# Patient Record
Sex: FEMALE | Race: WHITE | Employment: FULL TIME | URBAN - METROPOLITAN AREA
[De-identification: names, ages, dates, MRNs, and addresses within clinical notes are randomized per-mention and may not be internally consistent; named-entity substitution may affect disease eponyms.]

---

## 2017-02-28 ENCOUNTER — APPOINTMENT (OUTPATIENT)
Dept: LAB | Facility: REFERENCE LAB | Age: 32
End: 2017-02-28
Attending: OBSTETRICS & GYNECOLOGY
Payer: COMMERCIAL

## 2017-02-28 ENCOUNTER — OFFICE VISIT (OUTPATIENT)
Dept: OBGYN CLINIC | Facility: CLINIC | Age: 32
End: 2017-02-28

## 2017-02-28 VITALS
SYSTOLIC BLOOD PRESSURE: 120 MMHG | DIASTOLIC BLOOD PRESSURE: 80 MMHG | WEIGHT: 184.63 LBS | HEIGHT: 65 IN | BODY MASS INDEX: 30.76 KG/M2

## 2017-02-28 DIAGNOSIS — Z01.419 ENCOUNTER FOR GYNECOLOGICAL EXAMINATION WITHOUT ABNORMAL FINDING: Primary | ICD-10-CM

## 2017-02-28 DIAGNOSIS — N92.0 MENORRHAGIA WITH REGULAR CYCLE: ICD-10-CM

## 2017-02-28 LAB
ERYTHROCYTE [DISTWIDTH] IN BLOOD BY AUTOMATED COUNT: 13.5 % (ref 11–15)
HCT VFR BLD AUTO: 46.2 % (ref 35–48)
HGB BLD-MCNC: 15.4 G/DL (ref 12–16)
MCH RBC QN AUTO: 32.6 PG (ref 27–32)
MCHC RBC AUTO-ENTMCNC: 33.4 G/DL (ref 32–37)
MCV RBC AUTO: 97.4 FL (ref 80–100)
PLATELET # BLD AUTO: 243 K/UL (ref 140–400)
PMV BLD AUTO: 8.2 FL (ref 7.4–10.3)
RBC # BLD AUTO: 4.74 M/UL (ref 3.7–5.4)
TSH SERPL-ACNC: 3.91 UIU/ML (ref 0.34–5.6)
WBC # BLD AUTO: 9.1 K/UL (ref 4–11)

## 2017-02-28 PROCEDURE — 84443 ASSAY THYROID STIM HORMONE: CPT

## 2017-02-28 PROCEDURE — 88175 CYTOPATH C/V AUTO FLUID REDO: CPT | Performed by: OBSTETRICS & GYNECOLOGY

## 2017-02-28 PROCEDURE — 85027 COMPLETE CBC AUTOMATED: CPT

## 2017-02-28 PROCEDURE — 99385 PREV VISIT NEW AGE 18-39: CPT | Performed by: OBSTETRICS & GYNECOLOGY

## 2017-02-28 PROCEDURE — 36415 COLL VENOUS BLD VENIPUNCTURE: CPT

## 2017-02-28 NOTE — PROGRESS NOTES
GYN H&P     2017  2:23 PM    CC:Patient presents for pap and evaluation of heavy bleeding.     HPI: Patient is a 27year old  who presents for annual.  + c/o episodic LAP \"like a burning sensation\"  X 1year which comes/goes, lasting up to 30 m Systems:  General: denies fevers, chills, fatigue and malaise, wt gain or loss  GI: denies abdominal pain, nausea, vomiting, diarrhea, constipation   :denies dysuria, denies urinary frequency.    Menses regular, no dysmenorrhea, no menorrhagia, no dyspare at an increased risk or bladder injury, but that this was possible with an abdominal or laparoscopic approach as well.  I discussed with the patient  the procedure including the preop/procedure/postop course and the risks of  bleeding, infection, damage to

## 2017-03-02 LAB — HPV I/H RISK 1 DNA SPEC QL NAA+PROBE: NEGATIVE

## 2017-03-04 ENCOUNTER — HOSPITAL ENCOUNTER (OUTPATIENT)
Dept: ULTRASOUND IMAGING | Age: 32
Discharge: HOME | End: 2017-03-04
Attending: OBSTETRICS & GYNECOLOGY
Payer: COMMERCIAL

## 2017-03-04 DIAGNOSIS — N92.0 MENORRHAGIA WITH REGULAR CYCLE: ICD-10-CM

## 2017-03-04 PROCEDURE — 76830 TRANSVAGINAL US NON-OB: CPT

## 2017-03-04 PROCEDURE — 76856 US EXAM PELVIC COMPLETE: CPT

## 2017-03-06 ENCOUNTER — TELEPHONE (OUTPATIENT)
Dept: OBGYN CLINIC | Facility: CLINIC | Age: 32
End: 2017-03-06

## 2017-03-07 ENCOUNTER — TELEPHONE (OUTPATIENT)
Dept: OBGYN CLINIC | Facility: CLINIC | Age: 32
End: 2017-03-07

## 2017-03-13 ENCOUNTER — MED REC SCAN ONLY (OUTPATIENT)
Dept: OBGYN CLINIC | Facility: CLINIC | Age: 32
End: 2017-03-13

## 2017-04-06 ENCOUNTER — OFFICE VISIT (OUTPATIENT)
Dept: OBGYN CLINIC | Facility: CLINIC | Age: 32
End: 2017-04-06

## 2017-04-06 VITALS
HEIGHT: 65 IN | SYSTOLIC BLOOD PRESSURE: 122 MMHG | BODY MASS INDEX: 29.38 KG/M2 | DIASTOLIC BLOOD PRESSURE: 88 MMHG | WEIGHT: 176.38 LBS

## 2017-04-06 DIAGNOSIS — Z09 POSTOP CHECK: Primary | ICD-10-CM

## 2017-04-06 PROCEDURE — 99024 POSTOP FOLLOW-UP VISIT: CPT | Performed by: OBSTETRICS & GYNECOLOGY

## 2017-04-06 RX ORDER — IBUPROFEN 600 MG/1
600 TABLET ORAL
COMMUNITY
Start: 2015-11-16 | End: 2018-11-19

## 2017-04-06 NOTE — PROGRESS NOTES
3 W postop  Vaginal hysterectomy. Doing well. No problems with urination. No fever or chills. No vaginal bleeding. She has returned to work. ABD soft nt/nd    Post op check - doing well. Recommend FU in 3 W.

## 2017-04-25 ENCOUNTER — OFFICE VISIT (OUTPATIENT)
Dept: OBGYN CLINIC | Facility: CLINIC | Age: 32
End: 2017-04-25

## 2017-04-25 VITALS
DIASTOLIC BLOOD PRESSURE: 76 MMHG | SYSTOLIC BLOOD PRESSURE: 120 MMHG | BODY MASS INDEX: 29.82 KG/M2 | WEIGHT: 179 LBS | HEIGHT: 65 IN

## 2017-04-25 DIAGNOSIS — J30.2 SEASONAL ALLERGIC RHINITIS, UNSPECIFIED ALLERGIC RHINITIS TRIGGER: ICD-10-CM

## 2017-04-25 DIAGNOSIS — Z09 POSTOP CHECK: Primary | ICD-10-CM

## 2017-04-25 PROCEDURE — 99024 POSTOP FOLLOW-UP VISIT: CPT | Performed by: OBSTETRICS & GYNECOLOGY

## 2017-04-25 RX ORDER — FLUTICASONE PROPIONATE 50 MCG
2 SPRAY, SUSPENSION (ML) NASAL DAILY
Qty: 2 BOTTLE | Refills: 5 | Status: SHIPPED | OUTPATIENT
Start: 2017-04-25 | End: 2019-06-11

## 2017-04-25 NOTE — PROGRESS NOTES
GYNECOLOGY RETURN VISIT          2017  4:09 PM    CC:Patient presents for post op check. HPI: Patient is a 27year old  No LMP recorded. who presents for above s/p 3/8 vg hys, bilateral salpingectomy. No c/o.   No pain, no nausea or vomiting palpable tenderness or masses, no discharge  Vagina: normal pink mucosa, no lesions, normal clear discharge. Cuff well healed      A/P: Patient is 27year old female here for postop check. Doing well. Ok to return to normal activities.     Flonase refilled

## 2017-05-03 ENCOUNTER — MED REC SCAN ONLY (OUTPATIENT)
Dept: OBGYN CLINIC | Facility: CLINIC | Age: 32
End: 2017-05-03

## 2018-01-22 ENCOUNTER — CHARTING TRANS (OUTPATIENT)
Dept: OTHER | Age: 33
End: 2018-01-22

## 2018-11-02 VITALS
RESPIRATION RATE: 16 BRPM | BODY MASS INDEX: 30.82 KG/M2 | WEIGHT: 185 LBS | DIASTOLIC BLOOD PRESSURE: 70 MMHG | SYSTOLIC BLOOD PRESSURE: 100 MMHG | HEIGHT: 65 IN | TEMPERATURE: 98.5 F | HEART RATE: 57 BPM

## 2018-11-16 ENCOUNTER — TELEPHONE (OUTPATIENT)
Dept: OBGYN CLINIC | Facility: CLINIC | Age: 33
End: 2018-11-16

## 2018-11-16 NOTE — TELEPHONE ENCOUNTER
Spoke to pt and encouraged her to see her PCP for orders for smoking cessation meds. Per pt, she does not have a PCP currently. Will discuss with Dr. Bee Arteaga on 11/19/18. Pt aware.

## 2018-11-19 ENCOUNTER — OFFICE VISIT (OUTPATIENT)
Dept: FAMILY MEDICINE CLINIC | Facility: CLINIC | Age: 33
End: 2018-11-19

## 2018-11-19 VITALS
DIASTOLIC BLOOD PRESSURE: 78 MMHG | WEIGHT: 188 LBS | HEIGHT: 65 IN | BODY MASS INDEX: 31.32 KG/M2 | OXYGEN SATURATION: 98 % | SYSTOLIC BLOOD PRESSURE: 120 MMHG | HEART RATE: 63 BPM

## 2018-11-19 DIAGNOSIS — F17.200 TOBACCO USE DISORDER: ICD-10-CM

## 2018-11-19 DIAGNOSIS — E28.2 PCOS (POLYCYSTIC OVARIAN SYNDROME): Primary | ICD-10-CM

## 2018-11-19 PROCEDURE — 99202 OFFICE O/P NEW SF 15 MIN: CPT | Performed by: FAMILY MEDICINE

## 2018-11-19 RX ORDER — BUPROPION HYDROCHLORIDE 150 MG/1
TABLET, EXTENDED RELEASE ORAL
Qty: 60 TABLET | Refills: 0 | Status: SHIPPED | OUTPATIENT
Start: 2018-11-19 | End: 2018-12-08

## 2018-11-19 NOTE — PROGRESS NOTES
CC:  Patient presents with:  Establish Care  Medication Request: would like to start medication to help quit smoking   Medication Request: metformin      HPI: 28year old here to discuss smoking cessation and resuming Metformin for PCOS.   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  Tobacco use disorder    - Will start Wellbutrin once daily x 3 days and increase to twice daily for smokin

## 2018-12-08 ENCOUNTER — OFFICE VISIT (OUTPATIENT)
Dept: FAMILY MEDICINE CLINIC | Facility: CLINIC | Age: 33
End: 2018-12-08

## 2018-12-08 VITALS
OXYGEN SATURATION: 99 % | HEART RATE: 60 BPM | SYSTOLIC BLOOD PRESSURE: 114 MMHG | BODY MASS INDEX: 31.49 KG/M2 | TEMPERATURE: 98 F | HEIGHT: 65 IN | DIASTOLIC BLOOD PRESSURE: 68 MMHG | WEIGHT: 189 LBS

## 2018-12-08 DIAGNOSIS — F17.200 TOBACCO USE DISORDER: ICD-10-CM

## 2018-12-08 DIAGNOSIS — J01.10 ACUTE NON-RECURRENT FRONTAL SINUSITIS: Primary | ICD-10-CM

## 2018-12-08 PROCEDURE — 99213 OFFICE O/P EST LOW 20 MIN: CPT | Performed by: FAMILY MEDICINE

## 2018-12-08 RX ORDER — BUPROPION HYDROCHLORIDE 150 MG/1
150 TABLET, EXTENDED RELEASE ORAL 2 TIMES DAILY
Qty: 60 TABLET | Refills: 1 | Status: SHIPPED | OUTPATIENT
Start: 2018-12-08 | End: 2019-02-12 | Stop reason: ALTCHOICE

## 2018-12-08 RX ORDER — AMOXICILLIN AND CLAVULANATE POTASSIUM 875; 125 MG/1; MG/1
1 TABLET, FILM COATED ORAL 2 TIMES DAILY
Qty: 20 TABLET | Refills: 0 | Status: SHIPPED | OUTPATIENT
Start: 2018-12-08 | End: 2018-12-18

## 2018-12-08 NOTE — PROGRESS NOTES
CC:  Patient presents with:  Sinus Problem: swollen lymph nodes, and both ears hurt, started about 4 days ago-declines fever      HPI: 28year old female here with sinus pain, ear pain, and lymph node swelling x 4 days.   Developed bilatreal ear pain about tobacco: Never Used    Substance and Sexual Activity      Alcohol use:  Yes        Alcohol/week: 0.0 oz      Drug use: No      Sexual activity: No        Partners: Male    Other Topics      Concerns:        Caffeine Concern: Not Asked        Exercise: Not A bacterial component, and antibiotic sent to pharmacy with wait and see instructions reviewed    2.  Tobacco use disorder    - Continue cessation with Wellbutrin twice daily, refill provided   - BuPROPion HCl ER, SR, (WELLBUTRIN SR) 150 MG Oral Tablet 12 Hr;

## 2019-02-12 ENCOUNTER — LAB ENCOUNTER (OUTPATIENT)
Dept: LAB | Facility: REFERENCE LAB | Age: 34
End: 2019-02-12
Attending: FAMILY MEDICINE
Payer: COMMERCIAL

## 2019-02-12 ENCOUNTER — OFFICE VISIT (OUTPATIENT)
Dept: FAMILY MEDICINE CLINIC | Facility: CLINIC | Age: 34
End: 2019-02-12
Payer: COMMERCIAL

## 2019-02-12 VITALS
HEIGHT: 65 IN | BODY MASS INDEX: 32.49 KG/M2 | SYSTOLIC BLOOD PRESSURE: 112 MMHG | OXYGEN SATURATION: 98 % | DIASTOLIC BLOOD PRESSURE: 66 MMHG | WEIGHT: 195 LBS | HEART RATE: 74 BPM

## 2019-02-12 DIAGNOSIS — F50.81 BINGE EATING DISORDER: ICD-10-CM

## 2019-02-12 DIAGNOSIS — J30.2 SEASONAL ALLERGIES: ICD-10-CM

## 2019-02-12 DIAGNOSIS — F32.81 PMDD (PREMENSTRUAL DYSPHORIC DISORDER): ICD-10-CM

## 2019-02-12 DIAGNOSIS — Z00.01 ENCOUNTER FOR ROUTINE ADULT HEALTH EXAMINATION WITH ABNORMAL FINDINGS: ICD-10-CM

## 2019-02-12 DIAGNOSIS — Z00.01 ENCOUNTER FOR ROUTINE ADULT HEALTH EXAMINATION WITH ABNORMAL FINDINGS: Primary | ICD-10-CM

## 2019-02-12 LAB
ALBUMIN SERPL-MCNC: 3.7 G/DL (ref 3.4–5)
ALBUMIN/GLOB SERPL: 1 {RATIO} (ref 1–2)
ALP LIVER SERPL-CCNC: 54 U/L (ref 37–98)
ALT SERPL-CCNC: 26 U/L (ref 13–56)
ANION GAP SERPL CALC-SCNC: 9 MMOL/L (ref 0–18)
AST SERPL-CCNC: 15 U/L (ref 15–37)
BASOPHILS # BLD AUTO: 0.04 X10(3) UL (ref 0–0.2)
BASOPHILS NFR BLD AUTO: 0.7 %
BILIRUB SERPL-MCNC: 0.5 MG/DL (ref 0.1–2)
BUN BLD-MCNC: 7 MG/DL (ref 7–18)
BUN/CREAT SERPL: 8.6 (ref 10–20)
CALCIUM BLD-MCNC: 8.7 MG/DL (ref 8.5–10.1)
CHLORIDE SERPL-SCNC: 103 MMOL/L (ref 98–107)
CHOLEST SMN-MCNC: 206 MG/DL (ref ?–200)
CO2 SERPL-SCNC: 28 MMOL/L (ref 21–32)
CREAT BLD-MCNC: 0.81 MG/DL (ref 0.55–1.02)
DEPRECATED RDW RBC AUTO: 43.1 FL (ref 35.1–46.3)
EOSINOPHIL # BLD AUTO: 0.15 X10(3) UL (ref 0–0.7)
EOSINOPHIL NFR BLD AUTO: 2.5 %
ERYTHROCYTE [DISTWIDTH] IN BLOOD BY AUTOMATED COUNT: 12 % (ref 11–15)
GLOBULIN PLAS-MCNC: 3.7 G/DL (ref 2.8–4.4)
GLUCOSE BLD-MCNC: 102 MG/DL (ref 70–99)
HCT VFR BLD AUTO: 41.7 % (ref 35–48)
HDLC SERPL-MCNC: 36 MG/DL (ref 40–59)
HGB BLD-MCNC: 14.4 G/DL (ref 12–16)
IMM GRANULOCYTES # BLD AUTO: 0.01 X10(3) UL (ref 0–1)
IMM GRANULOCYTES NFR BLD: 0.2 %
LDLC SERPL CALC-MCNC: 99 MG/DL (ref ?–100)
LYMPHOCYTES # BLD AUTO: 1.8 X10(3) UL (ref 1–4)
LYMPHOCYTES NFR BLD AUTO: 30.3 %
M PROTEIN MFR SERPL ELPH: 7.4 G/DL (ref 6.4–8.2)
MCH RBC QN AUTO: 33.6 PG (ref 26–34)
MCHC RBC AUTO-ENTMCNC: 34.5 G/DL (ref 31–37)
MCV RBC AUTO: 97.4 FL (ref 80–100)
MONOCYTES # BLD AUTO: 0.45 X10(3) UL (ref 0.1–1)
MONOCYTES NFR BLD AUTO: 7.6 %
NEUTROPHILS # BLD AUTO: 3.49 X10 (3) UL (ref 1.5–7.7)
NEUTROPHILS # BLD AUTO: 3.49 X10(3) UL (ref 1.5–7.7)
NEUTROPHILS NFR BLD AUTO: 58.7 %
NONHDLC SERPL-MCNC: 170 MG/DL (ref ?–130)
OSMOLALITY SERPL CALC.SUM OF ELEC: 288 MOSM/KG (ref 275–295)
PLATELET # BLD AUTO: 250 10(3)UL (ref 150–450)
POTASSIUM SERPL-SCNC: 4.4 MMOL/L (ref 3.5–5.1)
RBC # BLD AUTO: 4.28 X10(6)UL (ref 3.8–5.3)
SODIUM SERPL-SCNC: 140 MMOL/L (ref 136–145)
TRIGL SERPL-MCNC: 354 MG/DL (ref 30–149)
WBC # BLD AUTO: 5.9 X10(3) UL (ref 4–11)

## 2019-02-12 PROCEDURE — 36415 COLL VENOUS BLD VENIPUNCTURE: CPT

## 2019-02-12 PROCEDURE — 99395 PREV VISIT EST AGE 18-39: CPT | Performed by: FAMILY MEDICINE

## 2019-02-12 PROCEDURE — 80061 LIPID PANEL: CPT

## 2019-02-12 PROCEDURE — 99213 OFFICE O/P EST LOW 20 MIN: CPT | Performed by: FAMILY MEDICINE

## 2019-02-12 PROCEDURE — 85025 COMPLETE CBC W/AUTO DIFF WBC: CPT

## 2019-02-12 PROCEDURE — 80053 COMPREHEN METABOLIC PANEL: CPT

## 2019-02-12 RX ORDER — PHENTERMINE HYDROCHLORIDE 15 MG/1
15 CAPSULE ORAL EVERY MORNING
Qty: 30 CAPSULE | Refills: 0 | Status: SHIPPED | OUTPATIENT
Start: 2019-02-12 | End: 2019-06-11

## 2019-02-12 RX ORDER — FLUOXETINE 10 MG/1
CAPSULE ORAL
Qty: 60 CAPSULE | Refills: 0 | Status: SHIPPED | OUTPATIENT
Start: 2019-02-12 | End: 2019-03-19

## 2019-02-12 RX ORDER — FLUOXETINE 10 MG/1
CAPSULE ORAL
Qty: 60 CAPSULE | Refills: 0 | Status: SHIPPED | OUTPATIENT
Start: 2019-02-12 | End: 2019-02-12

## 2019-02-12 NOTE — PATIENT INSTRUCTIONS
-Start taking Bupropion 150 mg once daily for 3 days, then stop Bupropion for 1 days, then start Fluoxetine 10 mg daily  -After 7 days of taking Fluoxetine 10 mg daily, increase to 20 mg daily  -Start Phentermine 15 mg daily and can increase at next visit overweight or obese and have 1 or more other risk factors for diabetes At least every 3 years.  Also, testing for diabetes during pregnancy after the 24th week.    Type 2 diabetes, prediabetes All women diagnosed with gestational diabetes Lifelong testing e given 6 months after the first dose   Influenza (flu) All women in this age group Once a year   Measles, mumps, rubella (MMR) All women in this age group who have no record of these infections or vaccines 1 or 2 doses   Meningococcal Women at increased ris should get all appropriate catch-up vaccines recommended by the CDC. Date Last Reviewed: 10/1/2017  © 4017-9670 [employer]. [address] [facility], [address] [place] [address]. All rights reserved.  This information is not intended as a substitute for heartbeat  · feeling faint or lightheaded, falls  · feeling agitated, angry, or irritable  · hallucination, loss of contact with reality  · loss of balance or coordination  · loss of memory  · restlessness, pacing, inability to keep still  · seizures  · st schedule. Do not take double or extra doses. Where should I keep my medicine? Keep out of the reach of children. Store at room temperature between 15 and 30 degrees C (59 and 86 degrees F). Throw away any unused medicine after the expiration date.   What severely restless, overly excited and hyperactive, or not being able to sleep. If this happens, especially at the beginning of treatment or after a change in dose, call your health care professional.  Nitza Mckeon may get drowsy or dizzy.  Do not drive, use machiner

## 2019-02-12 NOTE — PROGRESS NOTES
HPI:   Shaye Encarnacion is a 28year old female who presents for a complete physical exam.     Felt like the Bupropion helped a lot initially with cravings but over the last few weeks it has not helped as much and she has been gaining weight and smokin HCl 15 MG Oral Cap Take 1 capsule (15 mg total) by mouth every morning. Disp: 30 capsule Rfl: 0   FLUoxetine HCl 10 MG Oral Cap Take 1 capsule (10 mg total) by mouth daily for 7 days, THEN 2 capsules (20 mg total) daily for 23 days.  Disp: 60 capsule Rfl: 0 masses   BREAST: no dominant or suspicious mass, no nipple discharge  LUNGS: clear to auscultation  CARDIO: RRR without murmur  GI: good bowel sounds, no masses, HSM or tenderness  EXTREMITIES: no edema    No results found for: CHOLEST, HDL, LDL, TRIGLY cessation  -Diabetes screening which she desires  -Cholesterol screening which she desires  -Recommendation for yearly influenza vaccine  -Need for Tdap once as an adult and Td booster every 10 years  -Contraception: Essure and SUNDEEP  -STI screening (BHAKTI/Chla

## 2019-02-14 DIAGNOSIS — R73.01 ELEVATED FASTING GLUCOSE: Primary | ICD-10-CM

## 2019-02-14 DIAGNOSIS — E78.1 HYPERTRIGLYCERIDEMIA: ICD-10-CM

## 2019-03-19 RX ORDER — FLUOXETINE 10 MG/1
CAPSULE ORAL
Qty: 60 CAPSULE | Refills: 0 | Status: SHIPPED | OUTPATIENT
Start: 2019-03-19 | End: 2019-06-11 | Stop reason: ALTCHOICE

## 2019-03-19 RX ORDER — PHENTERMINE HYDROCHLORIDE 15 MG/1
15 CAPSULE ORAL EVERY MORNING
Qty: 30 CAPSULE | Refills: 0 | OUTPATIENT
Start: 2019-03-19

## 2019-03-19 NOTE — TELEPHONE ENCOUNTER
A refill request was received for:  Requested Prescriptions     Pending Prescriptions Disp Refills   • FLUOXETINE HCL 10 MG Oral Cap [Pharmacy Med Name: FLUOXETINE 10MG CAPSULES] 60 capsule 0     Sig: TAKE 1 CAPSULE(10 MG) BY MOUTH DAILY FOR 7 DAYS THEN TA

## 2019-06-07 ENCOUNTER — TELEPHONE (OUTPATIENT)
Dept: FAMILY MEDICINE CLINIC | Facility: CLINIC | Age: 34
End: 2019-06-07

## 2019-06-07 NOTE — TELEPHONE ENCOUNTER
Pt advised of AS's suggestions and verbalized understanding. This RN reviewed with pt the open orders for repeat blood work, pt will go in to get those drawn before her next appt. Pt reminded they should be fasting.     Jamison Bowie, DO  You 2 minutes ago (

## 2019-06-07 NOTE — TELEPHONE ENCOUNTER
Pt states she has had foggy urine with \"some pink color in it\", for about month. Pt states she is drinking a lot of water, but feels \"thirsty all the time\". Pt states 2-3 L per day.   Denies pain and burning with urination but does state she has freque

## 2019-06-07 NOTE — TELEPHONE ENCOUNTER
Pt calling c/o burning in both thums and now spread to both hands. Pt also c/o \"pinkish foggy\" urine. appt scheduled for 06/11/2019 with Dr Jhonatan Fay but would like nurse to call her back.

## 2019-06-10 ENCOUNTER — LAB ENCOUNTER (OUTPATIENT)
Dept: LAB | Facility: REFERENCE LAB | Age: 34
End: 2019-06-10
Attending: FAMILY MEDICINE
Payer: COMMERCIAL

## 2019-06-10 DIAGNOSIS — E78.1 HYPERTRIGLYCERIDEMIA: ICD-10-CM

## 2019-06-10 DIAGNOSIS — R73.01 ELEVATED FASTING GLUCOSE: ICD-10-CM

## 2019-06-10 PROCEDURE — 80061 LIPID PANEL: CPT

## 2019-06-10 PROCEDURE — 36415 COLL VENOUS BLD VENIPUNCTURE: CPT

## 2019-06-10 PROCEDURE — 82947 ASSAY GLUCOSE BLOOD QUANT: CPT

## 2019-06-11 ENCOUNTER — OFFICE VISIT (OUTPATIENT)
Dept: FAMILY MEDICINE CLINIC | Facility: CLINIC | Age: 34
End: 2019-06-11
Payer: COMMERCIAL

## 2019-06-11 VITALS
OXYGEN SATURATION: 98 % | HEIGHT: 65 IN | WEIGHT: 184 LBS | SYSTOLIC BLOOD PRESSURE: 110 MMHG | BODY MASS INDEX: 30.66 KG/M2 | DIASTOLIC BLOOD PRESSURE: 80 MMHG | HEART RATE: 72 BPM

## 2019-06-11 DIAGNOSIS — G62.9 NEUROPATHY: Primary | ICD-10-CM

## 2019-06-11 DIAGNOSIS — R73.01 ELEVATED FASTING GLUCOSE: ICD-10-CM

## 2019-06-11 DIAGNOSIS — F17.200 TOBACCO USE DISORDER: ICD-10-CM

## 2019-06-11 DIAGNOSIS — E78.1 HYPERTRIGLYCERIDEMIA: ICD-10-CM

## 2019-06-11 DIAGNOSIS — J30.2 SEASONAL ALLERGIC RHINITIS: ICD-10-CM

## 2019-06-11 PROCEDURE — 99214 OFFICE O/P EST MOD 30 MIN: CPT | Performed by: FAMILY MEDICINE

## 2019-06-11 RX ORDER — BUPROPION HYDROCHLORIDE 150 MG/1
TABLET, EXTENDED RELEASE ORAL
Qty: 60 TABLET | Refills: 0 | Status: SHIPPED | OUTPATIENT
Start: 2019-06-11 | End: 2019-08-05

## 2019-06-11 RX ORDER — FLUTICASONE PROPIONATE 50 MCG
2 SPRAY, SUSPENSION (ML) NASAL DAILY
Qty: 2 BOTTLE | Refills: 5 | Status: SHIPPED | OUTPATIENT
Start: 2019-06-11 | End: 2020-01-07

## 2019-06-11 RX ORDER — PHENTERMINE HYDROCHLORIDE 15 MG/1
15 CAPSULE ORAL EVERY MORNING
Qty: 30 CAPSULE | Refills: 1 | Status: SHIPPED | OUTPATIENT
Start: 2019-06-11 | End: 2020-01-07

## 2019-06-11 NOTE — PATIENT INSTRUCTIONS
Getting Support for Quitting Smoking    You don’t have to go through the process of quitting smoking without support. Tell people you are quitting. The support of friends, coworkers, and family members can make a big difference.  Face-to-face or telepho Sometimes you may just need to talk when you miss smoking. Ex-smokers are good to talk to, because they’re likely to know how you feel. You may need extra support in the first few weeks after you quit.  Ask a friend to call you each day to see how you’re do Checking your cholesterol  Your cholesterol is checked with a simple blood test. The results tell you how much cholesterol you have in your blood. Get checked as often as your healthcare provider suggests.  If you have diabetes or high cholesterol, you may · Triglyceride is a type of fat the body uses to store energy. Too much triglyceride can increase your risk for heart disease. Triglyceride levels should be under 150. My triglyceride is:  ________________  Based on your other health issues and risk factors Make a plan to have regular cholesterol checks. You may need to fast before getting this test. Also ask your provider about any side effects your medicines may cause. Let your provider know about any side effects you have.  You may need to take more than on If you are on cholesterol-lowering medicines, you may have this test to see how well your treatment is working. What other tests might I have along with this test?  Your healthcare provider will order screening tests for LDL, HDL, and total cholesterol. If your triglycerides are extremely high—above 500 mg/dL—you may have an added risk for problems with your pancreas. You will likely need medicine to lower your levels along with recommended changes in diet and lifestyle. How is this test done?   The test

## 2019-06-11 NOTE — PROGRESS NOTES
CC:  Patient presents with:  Pain: burning in both hands lasted a couple hours thursday night no problem since 1st occurance      HPI: 27year old female here with bilateral hand burning for a few hours Thursday night that resolved.    At first she felt bur pain but no arm radicular symptoms   ALL: seasonal allergies    Past Medical History:   Diagnosis Date   • PCOS (polycystic ovarian syndrome)        Social History    Socioeconomic History      Marital status: Single      Spouse name: Not on file      Numb Stress Concern: Not Asked        Weight Concern: Not Asked    Social History Narrative      No h/o of abuse            Lives with son         Current Outpatient Medications:  FLUOXETINE HCL 10 MG Oral Cap TAKE 1 CAPSULE(10 MG) BY MOUTH DAILY FOR 7 DAYS sugar intake    4. Tobacco use disorder    - Plans to start Bupropion when she is ready to quit and instructions given   - buPROPion HCl ER, SR, (WELLBUTRIN SR) 150 MG Oral Tablet 12 Hr; 1 tablet by mouth daily for 3 days.   Then 1 tablet by mouth twice romie

## 2019-08-05 DIAGNOSIS — F17.200 TOBACCO USE DISORDER: ICD-10-CM

## 2019-08-05 RX ORDER — BUPROPION HYDROCHLORIDE 150 MG/1
TABLET, EXTENDED RELEASE ORAL
Qty: 60 TABLET | Refills: 0 | Status: SHIPPED | OUTPATIENT
Start: 2019-08-05 | End: 2020-01-07

## 2019-08-05 NOTE — TELEPHONE ENCOUNTER
A refill request was received for:  Requested Prescriptions     Pending Prescriptions Disp Refills   • buPROPion HCl ER, SR, 150 MG Oral Tablet 12 Hr [Pharmacy Med Name: BUPROPION SR 150MG TABLETS (12 H)] 60 tablet 0     Sig: TAKE 1 TABLET BY MOUTH DAILY F

## 2020-01-06 ENCOUNTER — APPOINTMENT (OUTPATIENT)
Dept: LAB | Facility: REFERENCE LAB | Age: 35
End: 2020-01-06
Attending: FAMILY MEDICINE
Payer: COMMERCIAL

## 2020-01-06 DIAGNOSIS — Z00.00 ENCOUNTER FOR ROUTINE ADULT HEALTH EXAMINATION WITHOUT ABNORMAL FINDINGS: ICD-10-CM

## 2020-01-06 LAB
CHOLEST SMN-MCNC: 157 MG/DL (ref ?–200)
EST. AVERAGE GLUCOSE BLD GHB EST-MCNC: 103 MG/DL (ref 68–126)
HBA1C MFR BLD HPLC: 5.2 % (ref ?–5.7)
HDLC SERPL-MCNC: 30 MG/DL (ref 40–59)
LDLC SERPL CALC-MCNC: 92 MG/DL (ref ?–100)
NONHDLC SERPL-MCNC: 127 MG/DL (ref ?–130)
PATIENT FASTING Y/N/NP: YES
TRIGL SERPL-MCNC: 174 MG/DL (ref 30–149)
VLDLC SERPL CALC-MCNC: 35 MG/DL (ref 0–30)

## 2020-01-06 PROCEDURE — 36415 COLL VENOUS BLD VENIPUNCTURE: CPT

## 2020-01-06 PROCEDURE — 83036 HEMOGLOBIN GLYCOSYLATED A1C: CPT

## 2020-01-06 PROCEDURE — 80061 LIPID PANEL: CPT

## 2020-01-07 ENCOUNTER — OFFICE VISIT (OUTPATIENT)
Dept: FAMILY MEDICINE CLINIC | Facility: CLINIC | Age: 35
End: 2020-01-07

## 2020-01-07 VITALS
HEIGHT: 65 IN | HEART RATE: 79 BPM | DIASTOLIC BLOOD PRESSURE: 72 MMHG | WEIGHT: 190 LBS | OXYGEN SATURATION: 98 % | SYSTOLIC BLOOD PRESSURE: 120 MMHG | BODY MASS INDEX: 31.65 KG/M2

## 2020-01-07 DIAGNOSIS — M54.12 CERVICAL RADICULOPATHY: ICD-10-CM

## 2020-01-07 DIAGNOSIS — Z00.01 ENCOUNTER FOR ROUTINE ADULT HEALTH EXAMINATION WITH ABNORMAL FINDINGS: Primary | ICD-10-CM

## 2020-01-07 DIAGNOSIS — F17.200 TOBACCO USE DISORDER: ICD-10-CM

## 2020-01-07 DIAGNOSIS — J30.2 SEASONAL ALLERGIES: ICD-10-CM

## 2020-01-07 DIAGNOSIS — Z23 NEED FOR VACCINATION: ICD-10-CM

## 2020-01-07 PROCEDURE — 90732 PPSV23 VACC 2 YRS+ SUBQ/IM: CPT | Performed by: FAMILY MEDICINE

## 2020-01-07 PROCEDURE — 90471 IMMUNIZATION ADMIN: CPT | Performed by: FAMILY MEDICINE

## 2020-01-07 PROCEDURE — 90686 IIV4 VACC NO PRSV 0.5 ML IM: CPT | Performed by: FAMILY MEDICINE

## 2020-01-07 PROCEDURE — 99395 PREV VISIT EST AGE 18-39: CPT | Performed by: FAMILY MEDICINE

## 2020-01-07 PROCEDURE — 90472 IMMUNIZATION ADMIN EACH ADD: CPT | Performed by: FAMILY MEDICINE

## 2020-01-07 RX ORDER — BUPROPION HYDROCHLORIDE 150 MG/1
150 TABLET, EXTENDED RELEASE ORAL 2 TIMES DAILY
Qty: 180 TABLET | Refills: 1 | Status: SHIPPED | OUTPATIENT
Start: 2020-01-07 | End: 2020-10-13 | Stop reason: ALTCHOICE

## 2020-01-07 RX ORDER — FLUTICASONE PROPIONATE 50 MCG
2 SPRAY, SUSPENSION (ML) NASAL DAILY
Qty: 1 INHALER | Refills: 5 | Status: SHIPPED | OUTPATIENT
Start: 2020-01-07 | End: 2021-03-30

## 2020-01-07 RX ORDER — PHENTERMINE HYDROCHLORIDE 15 MG/1
15 CAPSULE ORAL EVERY MORNING
Qty: 30 CAPSULE | Refills: 2 | Status: SHIPPED | OUTPATIENT
Start: 2020-01-07 | End: 2020-05-28

## 2020-01-07 NOTE — PROGRESS NOTES
HPI:   Darling Price is a 27year old female who presents for a complete physical exam.     Has had intermittent neck pain/muscle pain since being in the [employer] and was told in the past she may have a herniated disc.  Does have intermittent paresth Take 1 capsule (15 mg total) by mouth every morning. 30 capsule 2   • Fluticasone Propionate (FLONASE ALLERGY RELIEF) 50 MCG/ACT Nasal Suspension 2 sprays by Each Nare route daily.  1 Inhaler 5       Seasonal                  Grass                   Runny n to auscultation  CARDIO: RRR without murmur  GI: good bowel sounds, no masses, HSM or tenderness  : deferred, patient will see gynecologist for pap smear    EXTREMITIES: no edema    Cholesterol, Total (mg/dL)   Date Value   01/06/2020 157   06/10/2019 22 for Tdap once as an adult and Td booster every 10 years  -Contraception: Essure and SUNDEEP  -STI screening (GC/Chlamydia/HIV): Not indicated  -Hepatitis C screening for those born between [facility] or high risk: Not indicated     All questions were answer

## 2020-01-07 NOTE — PATIENT INSTRUCTIONS
-Let me know which allergist and physical therapists are in your network      Prevention Guidelines, Women Ages 25 to 44  Screening tests and vaccines are an important part of managing your health.  A screening test is done to find possible disorders or dis week.    Type 2 diabetes, prediabetes All women diagnosed with gestational diabetes Lifelong testing every 3 years   Type 2 diabetes All women with prediabetes Every year   Gonorrhea Sexually active women at increased risk for infection At routine exams who have no record of these infections or vaccines 1 or 2 doses   Meningococcal Women at increased risk for infection should talk with their healthcare provider 1 or more doses   Pneumococcal conjugate vaccine (PCV13) and pneumococcal polysaccharide vaccin your healthcare professional's instructions.

## 2020-01-09 ENCOUNTER — HOSPITAL ENCOUNTER (OUTPATIENT)
Age: 35
Discharge: HOME | End: 2020-01-09
Attending: EMERGENCY MEDICINE
Payer: COMMERCIAL

## 2020-01-09 VITALS
TEMPERATURE: 99 F | DIASTOLIC BLOOD PRESSURE: 64 MMHG | HEART RATE: 74 BPM | RESPIRATION RATE: 18 BRPM | OXYGEN SATURATION: 100 % | SYSTOLIC BLOOD PRESSURE: 137 MMHG

## 2020-01-09 DIAGNOSIS — M79.18 MYOFASCIAL MUSCLE PAIN: ICD-10-CM

## 2020-01-09 DIAGNOSIS — T80.69XA ALLERGIC REACTION TO VACCINE: Primary | ICD-10-CM

## 2020-01-09 LAB
POCT INFLUENZA A: NEGATIVE
POCT INFLUENZA B: NEGATIVE
S PYO AG THROAT QL: NEGATIVE

## 2020-01-09 PROCEDURE — 87502 INFLUENZA DNA AMP PROBE: CPT | Performed by: EMERGENCY MEDICINE

## 2020-01-09 PROCEDURE — 99204 OFFICE O/P NEW MOD 45 MIN: CPT

## 2020-01-09 PROCEDURE — 99213 OFFICE O/P EST LOW 20 MIN: CPT

## 2020-01-09 PROCEDURE — 87430 STREP A AG IA: CPT

## 2020-01-09 RX ORDER — PREDNISONE 20 MG/1
40 TABLET ORAL DAILY
Qty: 6 TABLET | Refills: 0 | Status: SHIPPED | OUTPATIENT
Start: 2020-01-09 | End: 2020-01-12

## 2020-01-09 NOTE — ED INITIAL ASSESSMENT (HPI)
Pt here with rash to left arm , pt states she receved a flu shot on Tuesday and her left upper arm became red and is very tender, pt states she started having headaches and sore throat on Tuesday as well,pt denies any fever

## 2020-01-09 NOTE — ED PROVIDER NOTES
Patient Seen in: [address]      History   Patient presents with:  Rash Skin Problem    Stated Complaint: SWELLING LEFT ARM BODYACHE SORE THROAT    HPI    Patient is a 41-year-old female with a history of polycystic ovar (36.9 °C) (Oral)   Resp 18   LMP 02/06/2017   SpO2 100%         Physical Exam    Alert and in no acute distress  HEENT: Normocephalic atraumatic  Eyes: Conjunctiva noninjected, no exudate  Ears: Tympanic membranes clear bilaterally, no swelling or discharg

## 2020-03-03 ENCOUNTER — PATIENT MESSAGE (OUTPATIENT)
Dept: FAMILY MEDICINE CLINIC | Facility: CLINIC | Age: 35
End: 2020-03-03

## 2020-03-03 DIAGNOSIS — M54.50 CHRONIC BILATERAL LOW BACK PAIN WITHOUT SCIATICA: ICD-10-CM

## 2020-03-03 DIAGNOSIS — M54.12 CERVICAL RADICULOPATHY: Primary | ICD-10-CM

## 2020-03-03 DIAGNOSIS — G89.29 CHRONIC BILATERAL LOW BACK PAIN WITHOUT SCIATICA: ICD-10-CM

## 2020-03-05 NOTE — TELEPHONE ENCOUNTER
From: Penny Laird  To:  Cori HardwickDO [email or web address]  Sent: 3/3/2020 9:49 AM CST  Subject: Referral Request    Miah Bass,    My insurance will not allow me to go anywhere out of your network for physical therapy even if they are in network with the insuran

## 2020-03-15 ENCOUNTER — PATIENT MESSAGE (OUTPATIENT)
Dept: FAMILY MEDICINE CLINIC | Facility: CLINIC | Age: 35
End: 2020-03-15

## 2020-03-16 NOTE — TELEPHONE ENCOUNTER
Per [facility] pt to go to be swabbed for expanded flu panel if negative to be swabbed for covid19. This RN discussed with pt and spouse Dorita Naqvi SOFIYA 73. msg relayed to both pts. Spouse was SOB after going up and down stairs.  They jessica /friends know whether or not they need to be concerned/quarantined. If drive-through testing becomes available and you think we should go, let us know. Otherwise, will just carry on with self quarantine. Thanks!

## 2020-05-28 ENCOUNTER — OFFICE VISIT (OUTPATIENT)
Dept: FAMILY MEDICINE CLINIC | Facility: CLINIC | Age: 35
End: 2020-05-28

## 2020-05-28 VITALS
DIASTOLIC BLOOD PRESSURE: 70 MMHG | HEIGHT: 65 IN | WEIGHT: 193 LBS | SYSTOLIC BLOOD PRESSURE: 128 MMHG | OXYGEN SATURATION: 97 % | TEMPERATURE: 98 F | HEART RATE: 76 BPM | BODY MASS INDEX: 32.15 KG/M2

## 2020-05-28 DIAGNOSIS — S91.212D LACERATION OF LEFT GREAT TOE WITHOUT FOREIGN BODY WITH DAMAGE TO NAIL, SUBSEQUENT ENCOUNTER: Primary | ICD-10-CM

## 2020-05-28 DIAGNOSIS — R63.5 WEIGHT GAIN: ICD-10-CM

## 2020-05-28 PROCEDURE — 99213 OFFICE O/P EST LOW 20 MIN: CPT | Performed by: FAMILY MEDICINE

## 2020-05-28 RX ORDER — PHENTERMINE HYDROCHLORIDE 15 MG/1
15 CAPSULE ORAL EVERY MORNING
Qty: 30 CAPSULE | Refills: 2 | Status: SHIPPED | OUTPATIENT
Start: 2020-05-28 | End: 2020-10-13

## 2020-05-28 RX ORDER — NAPROXEN 500 MG/1
500 TABLET ORAL 2 TIMES DAILY
COMMUNITY
Start: 2020-05-17 | End: 2020-10-13

## 2020-05-28 RX ORDER — HYDROCODONE BITARTRATE AND ACETAMINOPHEN 5; 325 MG/1; MG/1
TABLET ORAL
COMMUNITY
Start: 2020-05-18 | End: 2020-10-13

## 2020-09-04 ENCOUNTER — TELEMEDICINE (OUTPATIENT)
Dept: TELEHEALTH | Age: 35
End: 2020-09-04

## 2020-09-04 ENCOUNTER — HOSPITAL ENCOUNTER (EMERGENCY)
Facility: HOSPITAL | Age: 35
Discharge: HOME | End: 2020-09-04
Attending: EMERGENCY MEDICINE
Payer: COMMERCIAL

## 2020-09-04 ENCOUNTER — APPOINTMENT (OUTPATIENT)
Dept: MRI IMAGING | Facility: HOSPITAL | Age: 35
End: 2020-09-04
Attending: EMERGENCY MEDICINE
Payer: COMMERCIAL

## 2020-09-04 VITALS
TEMPERATURE: 99 F | OXYGEN SATURATION: 96 % | HEART RATE: 59 BPM | SYSTOLIC BLOOD PRESSURE: 127 MMHG | RESPIRATION RATE: 16 BRPM | DIASTOLIC BLOOD PRESSURE: 76 MMHG

## 2020-09-04 DIAGNOSIS — H53.9 VISION CHANGES: ICD-10-CM

## 2020-09-04 DIAGNOSIS — R20.2 PARESTHESIAS: Primary | ICD-10-CM

## 2020-09-04 DIAGNOSIS — Z02.9 ADMINISTRATIVE ENCOUNTER: Primary | ICD-10-CM

## 2020-09-04 LAB
ANION GAP SERPL CALC-SCNC: 6 MMOL/L (ref 0–18)
B-HCG UR QL: NEGATIVE
BASOPHILS # BLD AUTO: 0.06 X10(3) UL (ref 0–0.2)
BASOPHILS NFR BLD AUTO: 0.8 %
BILIRUB UR QL: NEGATIVE
BUN BLD-MCNC: 5 MG/DL (ref 7–18)
BUN/CREAT SERPL: 8.2 (ref 10–20)
CALCIUM BLD-MCNC: 8.8 MG/DL (ref 8.5–10.1)
CHLORIDE SERPL-SCNC: 106 MMOL/L (ref 98–112)
CLARITY UR: CLEAR
CO2 SERPL-SCNC: 24 MMOL/L (ref 21–32)
COLOR UR: YELLOW
CREAT BLD-MCNC: 0.61 MG/DL (ref 0.55–1.02)
DEPRECATED RDW RBC AUTO: 41.4 FL (ref 35.1–46.3)
EOSINOPHIL # BLD AUTO: 0.2 X10(3) UL (ref 0–0.7)
EOSINOPHIL NFR BLD AUTO: 2.6 %
ERYTHROCYTE [DISTWIDTH] IN BLOOD BY AUTOMATED COUNT: 11.7 % (ref 11–15)
GLUCOSE BLD-MCNC: 135 MG/DL (ref 70–99)
GLUCOSE UR-MCNC: NEGATIVE MG/DL
HCT VFR BLD AUTO: 42.2 % (ref 35–48)
HGB BLD-MCNC: 15 G/DL (ref 12–16)
IMM GRANULOCYTES # BLD AUTO: 0.02 X10(3) UL (ref 0–1)
IMM GRANULOCYTES NFR BLD: 0.3 %
KETONES UR-MCNC: 20 MG/DL
LEUKOCYTE ESTERASE UR QL STRIP.AUTO: NEGATIVE
LYMPHOCYTES # BLD AUTO: 2.62 X10(3) UL (ref 1–4)
LYMPHOCYTES NFR BLD AUTO: 33.7 %
MCH RBC QN AUTO: 34.2 PG (ref 26–34)
MCHC RBC AUTO-ENTMCNC: 35.5 G/DL (ref 31–37)
MCV RBC AUTO: 96.1 FL (ref 80–100)
MONOCYTES # BLD AUTO: 0.34 X10(3) UL (ref 0.1–1)
MONOCYTES NFR BLD AUTO: 4.4 %
NEUTROPHILS # BLD AUTO: 4.53 X10 (3) UL (ref 1.5–7.7)
NEUTROPHILS # BLD AUTO: 4.53 X10(3) UL (ref 1.5–7.7)
NEUTROPHILS NFR BLD AUTO: 58.2 %
NITRITE UR QL STRIP.AUTO: NEGATIVE
OSMOLALITY SERPL CALC.SUM OF ELEC: 281 MOSM/KG (ref 275–295)
PH UR: 6 [PH] (ref 5–8)
PLATELET # BLD AUTO: 237 10(3)UL (ref 150–450)
POTASSIUM SERPL-SCNC: 3.1 MMOL/L (ref 3.5–5.1)
PROT UR-MCNC: NEGATIVE MG/DL
RBC # BLD AUTO: 4.39 X10(6)UL (ref 3.8–5.3)
RBC #/AREA URNS AUTO: 1 /HPF
SODIUM SERPL-SCNC: 136 MMOL/L (ref 136–145)
SP GR UR STRIP: 1 (ref 1–1.03)
UROBILINOGEN UR STRIP-ACNC: <2
WBC # BLD AUTO: 7.8 X10(3) UL (ref 4–11)
WBC #/AREA URNS AUTO: <1 /HPF

## 2020-09-04 PROCEDURE — 81001 URINALYSIS AUTO W/SCOPE: CPT | Performed by: EMERGENCY MEDICINE

## 2020-09-04 PROCEDURE — 99285 EMERGENCY DEPT VISIT HI MDM: CPT

## 2020-09-04 PROCEDURE — 70551 MRI BRAIN STEM W/O DYE: CPT | Performed by: EMERGENCY MEDICINE

## 2020-09-04 PROCEDURE — 80048 BASIC METABOLIC PNL TOTAL CA: CPT | Performed by: EMERGENCY MEDICINE

## 2020-09-04 PROCEDURE — 85025 COMPLETE CBC W/AUTO DIFF WBC: CPT | Performed by: EMERGENCY MEDICINE

## 2020-09-04 PROCEDURE — 93005 ELECTROCARDIOGRAM TRACING: CPT

## 2020-09-04 PROCEDURE — 81025 URINE PREGNANCY TEST: CPT

## 2020-09-04 PROCEDURE — 93010 ELECTROCARDIOGRAM REPORT: CPT | Performed by: EMERGENCY MEDICINE

## 2020-09-04 PROCEDURE — 99499 UNLISTED E&M SERVICE: CPT | Performed by: NURSE PRACTITIONER

## 2020-09-04 PROCEDURE — 36415 COLL VENOUS BLD VENIPUNCTURE: CPT

## 2020-09-04 RX ORDER — POTASSIUM CHLORIDE 20 MEQ/1
40 TABLET, EXTENDED RELEASE ORAL ONCE
Status: COMPLETED | OUTPATIENT
Start: 2020-09-04 | End: 2020-09-04

## 2020-09-04 NOTE — ED NOTES
Patient presents to ER with c/o \"flashes of lights\" in vision s/p standing upwards after adjusting furniture. Patient c/o headache and numbness/tinlging to the back of her head and down her neck.  Per boyfriend patients speech was slower than normal with

## 2020-09-04 NOTE — PROGRESS NOTES
Well nourished 39 yo female in for neck and upper back pain. Relates she \"bent over\" and saw\"flashing lights\" and tingling all over\"  Boy friend relates that she \"is talking slowly now\" and She has \"some\" vision changes.   Instructed that she shou

## 2020-09-04 NOTE — ED INITIAL ASSESSMENT (HPI)
Luis Luna states she had an episode of flashing in visual field and head/spine tingling that resolved. Symptoms began one hour pta. Her boyfriend states her speech was somewhat \"slow\" for 15-20 minutes when her symptoms first started.

## 2020-09-04 NOTE — ED PROVIDER NOTES
Patient Seen in: [facility] AND [facility] Emergency Department      History   Patient presents with:  Numbness Weakness    Stated Complaint: flashing lights, head/back tingling    HPI    58-year-old female without significant past medical history presents with moist  Respiratory: there are no retractions, lungs are clear to auscultation  Cardiovascular: regular rate and rhythm  Gastrointestinal:  abdomen is soft and non tender, no masses, bowel sounds normal  Neurological: Speech normal.  Cranial nerves II throu Patient with normal neurologic examination throughout ED stay. Resting comfortably. No further symptoms since her arrival in the ED. No definitive cause of the patient's symptoms found. Possible vagal reaction?   We will discharge the patient home with

## 2020-10-13 PROBLEM — F32.0 CURRENT MILD EPISODE OF MAJOR DEPRESSIVE DISORDER WITHOUT PRIOR EPISODE (HCC): Status: ACTIVE | Noted: 2020-10-13

## 2020-10-13 PROBLEM — F32.0 CURRENT MILD EPISODE OF MAJOR DEPRESSIVE DISORDER WITHOUT PRIOR EPISODE: Status: ACTIVE | Noted: 2020-10-13

## 2020-10-13 NOTE — PROGRESS NOTES
CC:  Patient presents with:  Pap  Follow - Up: ER visit   Medication Request      HPI: 38year old female here for pap smear and follow-up on Wellbutrin as well as ER visit in early September.    Had vaginal hysterectomy with BSO 3 years ago for menorrhagia Needs      Financial resource strain: Not on file      Food insecurity        Worry: Not on file        Inability: Not on file      Transportation needs        Medical: Not on file        Non-medical: Not on file    Tobacco Use      Smoking status: Current Nasal Suspension 2 sprays by Each Nare route daily. 1 Inhaler 5       Seasonal, Grass, and Ragweed      Vitals:    10/13/20  1329   BP: 112/68   Pulse: 66   SpO2: 99%   Weight: 188 lb (85.3 kg)   Height: 65\"       Body mass index is 31.28 kg/m².     Physic DO  10/13/20  1:36 PM    Tobacco Cessation Documentation (Smoking and Smokeless included):    I had an in depth therapy session with Dread Evanshpiolito about her tobacco use risks and options using the [facility]'s Five A's approach:    Ask: Geno Dillon is using t

## 2020-12-01 ENCOUNTER — OFFICE VISIT (OUTPATIENT)
Dept: NEUROLOGY | Facility: CLINIC | Age: 35
End: 2020-12-01

## 2020-12-01 VITALS — BODY MASS INDEX: 30.82 KG/M2 | HEIGHT: 65 IN | WEIGHT: 185 LBS

## 2020-12-01 DIAGNOSIS — M54.2 NECK PAIN: Primary | ICD-10-CM

## 2020-12-01 DIAGNOSIS — R20.2 PARESTHESIA: ICD-10-CM

## 2020-12-01 DIAGNOSIS — R90.89 MRI OF BRAIN ABNORMAL: ICD-10-CM

## 2020-12-01 PROCEDURE — 3008F BODY MASS INDEX DOCD: CPT | Performed by: OTHER

## 2020-12-01 PROCEDURE — 99204 OFFICE O/P NEW MOD 45 MIN: CPT | Performed by: OTHER

## 2020-12-01 NOTE — PROGRESS NOTES
Neurology Initial Visit     Referred By: Dr. Patton ref.  provider found    Chief Complaint: Patient presents with:  Neurologic Problem: Patient presents today to review MRI of brain done which was abnormal. Patient states that she has been having some differ Mother    • Colon Cancer Maternal Grandmother         48   • Colon Cancer Maternal Grandfather         48         Current Outpatient Medications:   •  buPROPion HCl ER, XL, 300 MG Oral Tablet 24 Hr, Take 1 tablet (300 mg total) by mouth daily. , Disp: 90 ta symmetric  Brachioradialis 2 + bilateral symmetric  Patellar 2+ bilateral symmetric  Ankle jerk 2+ bilateral symmetric    No clonus  No Babinski sign    Coordination:  Finger to nose intact  Rapid alternating movements intact    Gait:  Normal posture  Norm attention immediately if symptoms worsen. Patient verbalized understanding of information given. All questions were answered. All side effects of drugs were discussed. Return to clinic in: Return if symptoms worsen or fail to improve.     Rudy Marsh

## 2020-12-04 ENCOUNTER — TELEPHONE (OUTPATIENT)
Dept: PHYSICAL THERAPY | Age: 35
End: 2020-12-04

## 2020-12-10 ENCOUNTER — HOSPITAL ENCOUNTER (OUTPATIENT)
Dept: MRI IMAGING | Facility: HOSPITAL | Age: 35
Discharge: HOME | End: 2020-12-10
Attending: Other
Payer: COMMERCIAL

## 2020-12-10 DIAGNOSIS — M54.2 NECK PAIN: ICD-10-CM

## 2020-12-10 DIAGNOSIS — R20.2 PARESTHESIA: ICD-10-CM

## 2020-12-10 PROCEDURE — 70544 MR ANGIOGRAPHY HEAD W/O DYE: CPT | Performed by: OTHER

## 2020-12-10 PROCEDURE — 70549 MR ANGIOGRAPH NECK W/O&W/DYE: CPT | Performed by: OTHER

## 2020-12-10 PROCEDURE — 72141 MRI NECK SPINE W/O DYE: CPT | Performed by: OTHER

## 2020-12-10 PROCEDURE — A9575 INJ GADOTERATE MEGLUMI 0.1ML: HCPCS | Performed by: OTHER

## 2020-12-11 ENCOUNTER — TELEPHONE (OUTPATIENT)
Dept: NEUROLOGY | Facility: CLINIC | Age: 35
End: 2020-12-11

## 2020-12-11 NOTE — TELEPHONE ENCOUNTER
Tete Mann MD  [address] [facility] Nurse             Please let patient know that MRA neck didn't show significant abnormalities.

## 2020-12-11 NOTE — TELEPHONE ENCOUNTER
MD GREER Zacarias Nurse             Please let patient know that MRI C spine didn't show significant abnormalities some mild degenerative changes that might account for neck pain but not for the rest of the symptoms.  We can do some

## 2020-12-11 NOTE — TELEPHONE ENCOUNTER
----- Message from Sherren Holly, MD sent at 12/11/2020 12:18 PM CST -----  Please let patient know that MRA brain didn't show significant abnormalities.

## 2020-12-11 NOTE — TELEPHONE ENCOUNTER
Informed patient of imaging results and recommendation per Dr. Coral Love. Patient states she signed up for PT already. But she wants to know the next step for treating her symptoms such as vision issues.

## 2020-12-14 NOTE — TELEPHONE ENCOUNTER
I was not sure what was the reason for her visual disturbances. One of the possibilities that we have discussed with her, these being migraine aura. We discussed potential preventive therapy but it might not be necessary if she does not want it.   If she

## 2020-12-14 NOTE — TELEPHONE ENCOUNTER
Spoke to patient, relayed Dr Duane Cannon message from 12/14/20. Patient states she has PT scheduled. Patient has gotten referral to see opthomologist Dr Seda Ruggiero  from Dr Stephanie Sharma on 10/27/20. Has not made appointment yet.  Will decide if will see Dr Seda Ruggiero

## 2020-12-15 ENCOUNTER — TELEMEDICINE (OUTPATIENT)
Dept: TELEHEALTH | Age: 35
End: 2020-12-15

## 2020-12-15 DIAGNOSIS — M54.2 NECK PAIN: Primary | ICD-10-CM

## 2020-12-15 PROCEDURE — 99212 OFFICE O/P EST SF 10 MIN: CPT | Performed by: NURSE PRACTITIONER

## 2020-12-15 NOTE — PATIENT INSTRUCTIONS
You can continue a non steroid anti-inflammatory meidcation such as ibuprofen or naproxen. Continue to apply heat to the area. Follow up with your primary care doctor. Go to the ER for any severe symptoms such as severe pain, numbness or weakness. · A soft cervical collar can help pain, especially pain with head movement. Your doctor can tell you if this is appropriate for your condition. · Some people find relief with heat.  Heat can be applied with either a warm shower or bath or a moist towel hea Kyaw last reviewed this educational content on 11/1/2019  © 5599-7593 [employer]. [address] [facility] [place] [facility] [place]. All rights reserved. This information is not intended as a substitute for professional medical care.  Always foll

## 2020-12-15 NOTE — PROGRESS NOTES
Telehealth Verbal Consent   I conducted a telehealth visit with Hazel glaser, 12/15/20, which was completed using two-way, real-time interactive audio and video communication.  This has been done in good ese to provide continuity of care Familia Thibodeaux is a 38year old female who presents for a video visit. Patient reports right sided neck pain. She states this problem has been ongoing for the last several weeks.  She states the pain flared up today while reaching for her shower hea Packs/day: 1.00        Years: 20.00        Pack years: 20        Types: Cigarettes      Smokeless tobacco: Never Used    Alcohol use:  Yes      Alcohol/week: 14.0 standard drinks      Types: 14 Standard drinks or equivalent per week      Frequency: 4 Pt verbalized understanding. States she will make an appointment with pcp.      Meds & Refills for this Visit:  Requested Prescriptions      No prescriptions requested or ordered in this encounter       Risks, benefits, and side effects of medication explai X-rays are usually not ordered for the initial evaluation of neck pain. But X-rays may be done if you had a forceful physical injury, such as a car accident or fall.  If pain continues and doesn’t respond to medical treatment, X-rays and other tests may be If X-rays, CT scans, or MRI scans were taken, you will be told of any new findings that may affect your care.   Call 911  Call 911 if you have:  · Sudden weakness or numbness in one or both arms  · Neck swelling, difficulty or painful swallowing  · Trouble

## 2020-12-31 ENCOUNTER — TELEPHONE (OUTPATIENT)
Dept: NEUROLOGY | Facility: CLINIC | Age: 35
End: 2020-12-31

## 2020-12-31 NOTE — TELEPHONE ENCOUNTER
Called [facility] to request extension of PT under authorization/referral # [id number]. T/t   Alla Miller 99 start date to 01/01/21 to. 04/01/21.  See referral.

## 2021-01-04 ENCOUNTER — OFFICE VISIT (OUTPATIENT)
Dept: PHYSICAL THERAPY | Facility: HOSPITAL | Age: 36
End: 2021-01-04
Attending: Other
Payer: COMMERCIAL

## 2021-01-04 DIAGNOSIS — M54.2 NECK PAIN: ICD-10-CM

## 2021-01-04 PROCEDURE — 97163 PT EVAL HIGH COMPLEX 45 MIN: CPT

## 2021-01-04 PROCEDURE — 97110 THERAPEUTIC EXERCISES: CPT

## 2021-01-04 NOTE — PROGRESS NOTES
PHYSICAL THERAPY EVALUATION:   Referring Physician: Dr. Cristiano Perez  Diagnosis: neck pain, lumbar spine pain    Date of Service: 1/4/2021     PATIENT SUMMARY   Precautions: standard    Past medical history: reviewed via epic electronic medical records and Beginning in September 2020 she started to notice visual disturbances in which she is now unable to read things farther away when previously she was able to and her total vision blurs often.  Has also noticed \"white zippy lines and lights\" in her visi Therapy: to learn what she can do to prevent neck pain from returning so frequently    OBJECTIVE:   Cognition/communication: patient is alert, oriented, able to express needs, answer questions, and follow directions    Pain:  Location: Cervical spine  Curr and hand numbness/tingling    AROM: lumbar spine  Flexion: finger tips reach feet, pain relieving for both lumbar and cervical spine.  Lumbar spine stays relatively flat with no reversal of lumbar lordotic curve   Extension: able to extend past upright, katelyn weeks. Symptoms will continue to be monitored throughout physical therapy with her neurologist and PCP notified of any further changes or new developments.  Physical therapy will work to limitations in cervical spine joint mobility, proprioception, strength normalized cervical spine joint mobility and full AROM in all planes of cervical spine motion to enable her to drive and perform ADLs without pain or limitations.   · The patient will be independent with a HEP for continued management and prevention of re-i

## 2021-01-05 ENCOUNTER — TELEPHONE (OUTPATIENT)
Dept: NEUROLOGY | Facility: CLINIC | Age: 36
End: 2021-01-05

## 2021-01-05 DIAGNOSIS — F41.9 ANXIETY: ICD-10-CM

## 2021-01-05 DIAGNOSIS — F32.A DEPRESSION, UNSPECIFIED DEPRESSION TYPE: Primary | ICD-10-CM

## 2021-01-05 DIAGNOSIS — R20.2 PARESTHESIA: ICD-10-CM

## 2021-01-05 NOTE — TELEPHONE ENCOUNTER
L/m advising Pt. authorization is not required for MRI brain w.wo cpt code 55393. Can proceed with scheduling appt.

## 2021-01-05 NOTE — TELEPHONE ENCOUNTER
Please let the patient of the placed some orders for the MRI, blood work, and psychology and psychiatry evaluation.

## 2021-01-05 NOTE — TELEPHONE ENCOUNTER
Spoke to patient, advised of orders placed by Dr Marielle Paul today. Given # for Joshua Beckford to call. Will have lab drawn at [facility] location. To wait and schedule MRI after authorization is obtained. No further questions.

## 2021-01-06 ENCOUNTER — TELEPHONE (OUTPATIENT)
Dept: PHYSICAL THERAPY | Facility: HOSPITAL | Age: 36
End: 2021-01-06

## 2021-01-06 ENCOUNTER — APPOINTMENT (OUTPATIENT)
Dept: PHYSICAL THERAPY | Facility: HOSPITAL | Age: 36
End: 2021-01-06
Attending: Other
Payer: COMMERCIAL

## 2021-01-08 ENCOUNTER — HOSPITAL ENCOUNTER (OUTPATIENT)
Dept: MRI IMAGING | Facility: HOSPITAL | Age: 36
Discharge: HOME | End: 2021-01-08
Attending: Other
Payer: COMMERCIAL

## 2021-01-08 DIAGNOSIS — F32.A DEPRESSION, UNSPECIFIED DEPRESSION TYPE: ICD-10-CM

## 2021-01-08 DIAGNOSIS — R20.2 PARESTHESIA: ICD-10-CM

## 2021-01-08 DIAGNOSIS — F41.9 ANXIETY: ICD-10-CM

## 2021-01-08 PROCEDURE — A9575 INJ GADOTERATE MEGLUMI 0.1ML: HCPCS | Performed by: OTHER

## 2021-01-08 PROCEDURE — 70553 MRI BRAIN STEM W/O & W/DYE: CPT | Performed by: OTHER

## 2021-01-11 ENCOUNTER — TELEPHONE (OUTPATIENT)
Dept: NEUROLOGY | Facility: CLINIC | Age: 36
End: 2021-01-11

## 2021-01-11 ENCOUNTER — OFFICE VISIT (OUTPATIENT)
Dept: PHYSICAL THERAPY | Facility: HOSPITAL | Age: 36
End: 2021-01-11
Attending: Other
Payer: COMMERCIAL

## 2021-01-11 DIAGNOSIS — G37.9 DEMYELINATING CHANGES IN BRAIN (HCC): Primary | ICD-10-CM

## 2021-01-11 PROCEDURE — 97140 MANUAL THERAPY 1/> REGIONS: CPT

## 2021-01-11 PROCEDURE — 97110 THERAPEUTIC EXERCISES: CPT

## 2021-01-11 RX ORDER — METHYLPREDNISOLONE 4 MG/1
TABLET ORAL
Qty: 21 TABLET | Refills: 0 | Status: SHIPPED | OUTPATIENT
Start: 2021-01-11 | End: 2021-01-26 | Stop reason: ALTCHOICE

## 2021-01-11 NOTE — TELEPHONE ENCOUNTER
I spoke with the patient. We discussed possibility of demyelinating disease and therefore I will continue the lumbar puncture. Please call her to help her set up.

## 2021-01-11 NOTE — PROGRESS NOTES
PHYSICAL THERAPY DAILY TREATMENT NOTE  Precautions: standard/universal  Fall risk: standard  Date of Evaluation: 1/4/21   Diagnosis: neck pain, lumbar spine pain     Insurance Type (# Auth): [address] [place] [address] (8 visits)  Visit #: 2     Progress note due on: visi bilaterally   Patient Response to Treatment: Penny Laird tolerated treatment well with no adverse reactions or reports of increased pain above baseline.      Assessment: Penny Laird is responding well to physical therapy interventions with

## 2021-01-11 NOTE — TELEPHONE ENCOUNTER
----- Message from Fátima Lutz MD sent at 1/11/2021  8:05 AM CST -----  I tried to call the patient about the results of the MRI. Appeared to be somewhat different from the September.   We will need to discuss it, most likely will need to set her

## 2021-01-11 NOTE — TELEPHONE ENCOUNTER
Spoke to patient and gave her information to schedule LP. She was understanding and will get this scheduled. She states that Dr. Missy Rivera was going to prescribe steroids. Explained that I would check with Dr. Lyndsey Mark. Pharmacy updated in [facility].

## 2021-01-11 NOTE — TELEPHONE ENCOUNTER
Per Dr. Anny Esquivel e-scribed to pharmacy. Left detailed message for patient notifying her that prescription was sent.

## 2021-01-13 ENCOUNTER — OFFICE VISIT (OUTPATIENT)
Dept: PHYSICAL THERAPY | Facility: HOSPITAL | Age: 36
End: 2021-01-13
Attending: Other
Payer: COMMERCIAL

## 2021-01-13 PROCEDURE — 97140 MANUAL THERAPY 1/> REGIONS: CPT

## 2021-01-13 NOTE — PROGRESS NOTES
PHYSICAL THERAPY DAILY TREATMENT NOTE  Precautions: standard/universal  Fall risk: standard  Date of Evaluation: 1/4/21   Diagnosis: neck pain, lumbar spine pain     Insurance Type (# Auth): [address] [place] [address] (8 visits)  Visit #: 3     Progress note due on: visi well to physical therapy interventions with improved cervical spine AROM post manual treatment to 65 degrees bilaterally.  Workstation ergonomic modifications improved tolerance to working with less cervical spine discomfort at the end of a work day from [facility]

## 2021-01-14 RX ORDER — BUPROPION HYDROCHLORIDE 300 MG/1
TABLET ORAL
Qty: 90 TABLET | Refills: 0 | Status: SHIPPED | OUTPATIENT
Start: 2021-01-14 | End: 2021-01-20

## 2021-01-14 NOTE — TELEPHONE ENCOUNTER
A refill request was received for:  Requested Prescriptions     Pending Prescriptions Disp Refills   • BUPROPION HCL ER, XL, 300 MG Oral Tablet 24 Hr [Pharmacy Med Name: BUPROPION XL 300MG TABLETS] 90 tablet 0     Sig: TAKE 1 TABLET(300 MG) BY MOUTH DAILY

## 2021-01-15 ENCOUNTER — PATIENT MESSAGE (OUTPATIENT)
Dept: NEUROLOGY | Facility: CLINIC | Age: 36
End: 2021-01-15

## 2021-01-15 ENCOUNTER — LAB ENCOUNTER (OUTPATIENT)
Dept: LAB | Age: 36
End: 2021-01-15
Attending: Other
Payer: COMMERCIAL

## 2021-01-15 DIAGNOSIS — G37.9 DEMYELINATING CHANGES IN BRAIN (HCC): ICD-10-CM

## 2021-01-15 DIAGNOSIS — R20.2 PARESTHESIA: ICD-10-CM

## 2021-01-15 LAB
ALBUMIN SERPL-MCNC: 3.9 G/DL (ref 3.4–5)
ALBUMIN/GLOB SERPL: 1.1 {RATIO} (ref 1–2)
ALP LIVER SERPL-CCNC: 56 U/L
ALT SERPL-CCNC: 40 U/L
ANION GAP SERPL CALC-SCNC: 4 MMOL/L (ref 0–18)
AST SERPL-CCNC: 18 U/L (ref 15–37)
BASOPHILS # BLD AUTO: 0.05 X10(3) UL (ref 0–0.2)
BASOPHILS NFR BLD AUTO: 0.8 %
BILIRUB SERPL-MCNC: 0.3 MG/DL (ref 0.1–2)
BUN BLD-MCNC: 6 MG/DL (ref 7–18)
BUN/CREAT SERPL: 8.6 (ref 10–20)
CALCIUM BLD-MCNC: 9.4 MG/DL (ref 8.5–10.1)
CERULOPLASMIN SERPL-MCNC: 21.9 MG/DL (ref 20–60)
CHLORIDE SERPL-SCNC: 107 MMOL/L (ref 98–112)
CO2 SERPL-SCNC: 26 MMOL/L (ref 21–32)
CREAT BLD-MCNC: 0.7 MG/DL
DEPRECATED RDW RBC AUTO: 38.4 FL (ref 35.1–46.3)
EOSINOPHIL # BLD AUTO: 0.16 X10(3) UL (ref 0–0.7)
EOSINOPHIL NFR BLD AUTO: 2.7 %
ERYTHROCYTE [DISTWIDTH] IN BLOOD BY AUTOMATED COUNT: 11.2 % (ref 11–15)
ERYTHROCYTE [SEDIMENTATION RATE] IN BLOOD: 6 MM/HR
GLOBULIN PLAS-MCNC: 3.5 G/DL (ref 2.8–4.4)
GLUCOSE BLD-MCNC: 88 MG/DL (ref 70–99)
HCT VFR BLD AUTO: 42.8 %
HGB BLD-MCNC: 14.9 G/DL
IGA SERPL-MCNC: 344 MG/DL (ref 70–312)
IMM GRANULOCYTES # BLD AUTO: 0.01 X10(3) UL (ref 0–1)
IMM GRANULOCYTES NFR BLD: 0.2 %
LYMPHOCYTES # BLD AUTO: 2.48 X10(3) UL (ref 1–4)
LYMPHOCYTES NFR BLD AUTO: 41.9 %
M PROTEIN MFR SERPL ELPH: 7.4 G/DL (ref 6.4–8.2)
MCH RBC QN AUTO: 32.7 PG (ref 26–34)
MCHC RBC AUTO-ENTMCNC: 34.8 G/DL (ref 31–37)
MCV RBC AUTO: 94.1 FL
MONOCYTES # BLD AUTO: 0.41 X10(3) UL (ref 0.1–1)
MONOCYTES NFR BLD AUTO: 6.9 %
NEUTROPHILS # BLD AUTO: 2.81 X10 (3) UL (ref 1.5–7.7)
NEUTROPHILS # BLD AUTO: 2.81 X10(3) UL (ref 1.5–7.7)
NEUTROPHILS NFR BLD AUTO: 47.5 %
OSMOLALITY SERPL CALC.SUM OF ELEC: 281 MOSM/KG (ref 275–295)
PATIENT FASTING Y/N/NP: YES
PLATELET # BLD AUTO: 296 10(3)UL (ref 150–450)
POTASSIUM SERPL-SCNC: 3.8 MMOL/L (ref 3.5–5.1)
RBC # BLD AUTO: 4.55 X10(6)UL
RHEUMATOID FACT SERPL-ACNC: <10 IU/ML (ref ?–15)
SARS-COV-2 RNA RESP QL NAA+PROBE: NOT DETECTED
SODIUM SERPL-SCNC: 137 MMOL/L (ref 136–145)
VIT B12 SERPL-MCNC: 311 PG/ML (ref 193–986)
WBC # BLD AUTO: 5.9 X10(3) UL (ref 4–11)

## 2021-01-15 PROCEDURE — 86255 FLUORESCENT ANTIBODY SCREEN: CPT

## 2021-01-15 PROCEDURE — 84165 PROTEIN E-PHORESIS SERUM: CPT

## 2021-01-15 PROCEDURE — 83516 IMMUNOASSAY NONANTIBODY: CPT

## 2021-01-15 PROCEDURE — 86256 FLUORESCENT ANTIBODY TITER: CPT

## 2021-01-15 PROCEDURE — 85025 COMPLETE CBC W/AUTO DIFF WBC: CPT | Performed by: OTHER

## 2021-01-15 PROCEDURE — 36415 COLL VENOUS BLD VENIPUNCTURE: CPT | Performed by: OTHER

## 2021-01-15 PROCEDURE — 82784 ASSAY IGA/IGD/IGG/IGM EACH: CPT

## 2021-01-15 PROCEDURE — 82390 ASSAY OF CERULOPLASMIN: CPT

## 2021-01-15 PROCEDURE — 86431 RHEUMATOID FACTOR QUANT: CPT | Performed by: OTHER

## 2021-01-15 PROCEDURE — 86021 WBC ANTIBODY IDENTIFICATION: CPT

## 2021-01-15 PROCEDURE — 83519 RIA NONANTIBODY: CPT

## 2021-01-15 PROCEDURE — 82607 VITAMIN B-12: CPT | Performed by: OTHER

## 2021-01-15 PROCEDURE — 86334 IMMUNOFIX E-PHORESIS SERUM: CPT

## 2021-01-15 PROCEDURE — 86341 ISLET CELL ANTIBODY: CPT

## 2021-01-15 PROCEDURE — 83876 ASSAY MYELOPEROXIDASE: CPT

## 2021-01-15 PROCEDURE — 85652 RBC SED RATE AUTOMATED: CPT | Performed by: OTHER

## 2021-01-15 PROCEDURE — 80053 COMPREHEN METABOLIC PANEL: CPT | Performed by: OTHER

## 2021-01-15 PROCEDURE — 86038 ANTINUCLEAR ANTIBODIES: CPT

## 2021-01-16 LAB
MYELOPEROX ANTIBODIES, IGG: 0 AU/ML
SERINE PROTEASE3, IGG: 1 AU/ML

## 2021-01-18 ENCOUNTER — HOSPITAL ENCOUNTER (OUTPATIENT)
Dept: GENERAL RADIOLOGY | Facility: HOSPITAL | Age: 36
Discharge: HOME | End: 2021-01-18
Attending: Other
Payer: COMMERCIAL

## 2021-01-18 ENCOUNTER — TELEPHONE (OUTPATIENT)
Dept: NEUROLOGY | Facility: CLINIC | Age: 36
End: 2021-01-18

## 2021-01-18 ENCOUNTER — APPOINTMENT (OUTPATIENT)
Dept: PHYSICAL THERAPY | Facility: HOSPITAL | Age: 36
End: 2021-01-18
Attending: Other
Payer: COMMERCIAL

## 2021-01-18 VITALS
RESPIRATION RATE: 15 BRPM | SYSTOLIC BLOOD PRESSURE: 109 MMHG | OXYGEN SATURATION: 96 % | HEART RATE: 54 BPM | DIASTOLIC BLOOD PRESSURE: 52 MMHG

## 2021-01-18 DIAGNOSIS — G37.9 DEMYELINATING CHANGES IN BRAIN (HCC): ICD-10-CM

## 2021-01-18 LAB
ALBUMIN SERPL ELPH-MCNC: 4.49 G/DL (ref 3.75–5.21)
ALBUMIN/GLOB SERPL: 1.72 {RATIO} (ref 1–2)
ALPHA1 GLOB SERPL ELPH-MCNC: 0.25 G/DL (ref 0.19–0.46)
ALPHA2 GLOB SERPL ELPH-MCNC: 0.61 G/DL (ref 0.48–1.05)
B-GLOBULIN SERPL ELPH-MCNC: 0.8 G/DL (ref 0.68–1.23)
COLOR CSF: COLORLESS
GAMMA GLOB SERPL ELPH-MCNC: 0.95 G/DL (ref 0.62–1.7)
GLUCOSE CSF-MCNC: 74 MG/DL (ref 40–70)
LYMPHOCYTES NFR CSF: 88 % (ref 40–80)
M PROTEIN MFR SERPL ELPH: 7.1 G/DL (ref 6.4–8.2)
MONOCYTES NFR CSF: 12 % (ref 15–45)
NEUTROPHILS NFR CSF: 0 % (ref 0–6)
NUCLEAR IGG TITR SER IF: NEGATIVE {TITER}
RBC # CSF: 3 /CUMM (ref 0–5)
TUBE # CSF: 3
TURBIDITY CSF QL: CLEAR
WBC # CSF: 2 /CUMM (ref 0–5)

## 2021-01-18 PROCEDURE — 82945 GLUCOSE OTHER FLUID: CPT

## 2021-01-18 PROCEDURE — 89051 BODY FLUID CELL COUNT: CPT

## 2021-01-18 PROCEDURE — 82784 ASSAY IGA/IGD/IGG/IGM EACH: CPT

## 2021-01-18 PROCEDURE — 82042 OTHER SOURCE ALBUMIN QUAN EA: CPT

## 2021-01-18 PROCEDURE — 82040 ASSAY OF SERUM ALBUMIN: CPT

## 2021-01-18 PROCEDURE — 62328 DX LMBR SPI PNXR W/FLUOR/CT: CPT | Performed by: OTHER

## 2021-01-18 PROCEDURE — 83916 OLIGOCLONAL BANDS: CPT

## 2021-01-18 RX ORDER — LIDOCAINE HYDROCHLORIDE 10 MG/ML
INJECTION, SOLUTION EPIDURAL; INFILTRATION; INTRACAUDAL; PERINEURAL
Status: COMPLETED
Start: 2021-01-18 | End: 2021-01-18

## 2021-01-18 RX ORDER — LIDOCAINE HYDROCHLORIDE 10 MG/ML
10 INJECTION, SOLUTION EPIDURAL; INFILTRATION; INTRACAUDAL; PERINEURAL ONCE
Status: COMPLETED | OUTPATIENT
Start: 2021-01-18 | End: 2021-01-18

## 2021-01-18 RX ADMIN — LIDOCAINE HYDROCHLORIDE 10 ML: 10 INJECTION, SOLUTION EPIDURAL; INFILTRATION; INTRACAUDAL; PERINEURAL at 13:15:00

## 2021-01-18 NOTE — TELEPHONE ENCOUNTER
----- Message from Lio Cain MD sent at 1/16/2021 12:08 PM CST -----  Please let the patient know that results of these particular lab tests so far were normal.    Thank you

## 2021-01-18 NOTE — TELEPHONE ENCOUNTER
----- Message from Kasey Hicks MD sent at 1/16/2021 12:08 PM CST -----  Please let the patient know that results of these particular lab tests so far were normal.    Thank you

## 2021-01-18 NOTE — TELEPHONE ENCOUNTER
----- Message from Trenton Gandhi MD sent at 1/17/2021 12:53 PM CST -----  Please let the patient know that results of these particular lab tests so far were normal.    Thank you

## 2021-01-18 NOTE — TELEPHONE ENCOUNTER
----- Message from Steve Aguilera MD sent at 1/17/2021 12:53 PM CST -----  Please let the patient know that results of these particular lab tests so far were normal.    Thank you

## 2021-01-18 NOTE — TELEPHONE ENCOUNTER
----- Message from Jacobo Mallory MD sent at 1/16/2021 12:08 PM CST -----  Please let the patient know that results of these particular lab tests so far were normal.    Thank you

## 2021-01-18 NOTE — IMAGING NOTE
SBAR  S; STATUS POST LUMBAR PUNCTURE  B.   A;  VSS, SITE D/I. NO PT C/O DENIES H/A DENIES PAIN  R; DISCHARGE HOME W/VERBAL WRITTEN-AVS INSTRUCTIONS., PT AGREES W/POC. AVS HAS F/U APPTS.

## 2021-01-18 NOTE — TELEPHONE ENCOUNTER
Spoke to patient and relayed information from Dr Alecia Benavides note 1/16/21. Left patient a my chart message with number to call for hematology appointment per patient request. Patient has no further questions at this time.

## 2021-01-18 NOTE — TELEPHONE ENCOUNTER
----- Message from Natalie Bird MD sent at 1/16/2021 12:09 PM CST -----  Please let the patient know that results of these particular lab tests so far were normal.  Except some elevation of immunoglobulin A.   I do not think it relates to her sympto

## 2021-01-18 NOTE — TELEPHONE ENCOUNTER
Spoke to patient, relayed message from Dr Alanna Dobson dated 1/16/21. No further questions at this time.

## 2021-01-19 ENCOUNTER — PATIENT MESSAGE (OUTPATIENT)
Dept: NEUROLOGY | Facility: CLINIC | Age: 36
End: 2021-01-19

## 2021-01-19 LAB
NEUTROPHIL ASSOCIATED AB: NEGATIVE
TTG IGA SER-ACNC: 0.6 U/ML (ref ?–7)

## 2021-01-19 NOTE — TELEPHONE ENCOUNTER
From: Jas Flowers  To:  Fátima Lutz MD  Sent: 1/19/2021 4:05 PM CST  Subject: Referral Request    I tried to schedule an appointment with the hematologist at the number I was given but they said they did not receive a referral so I cannot

## 2021-01-19 NOTE — TELEPHONE ENCOUNTER
See other phone encounter dated 1/18/21. Patient had LP done 1/8/21. I spoke to patient after LP was completed. This encounter closed.

## 2021-01-19 NOTE — TELEPHONE ENCOUNTER
From: Neo Romero  To: Flor Owen MD  Sent: 1/15/2021 5:29 PM CST  Subject: Visit Follow-up Question    My symptoms are rapidly getting worse. Today has been a particularly difficult day.  I am experiencing stiffness in my neck and should

## 2021-01-20 ENCOUNTER — APPOINTMENT (OUTPATIENT)
Dept: PHYSICAL THERAPY | Facility: HOSPITAL | Age: 36
End: 2021-01-20
Attending: Other
Payer: COMMERCIAL

## 2021-01-20 ENCOUNTER — OFFICE VISIT (OUTPATIENT)
Dept: FAMILY MEDICINE CLINIC | Facility: CLINIC | Age: 36
End: 2021-01-20

## 2021-01-20 VITALS
BODY MASS INDEX: 31.99 KG/M2 | DIASTOLIC BLOOD PRESSURE: 72 MMHG | HEART RATE: 60 BPM | SYSTOLIC BLOOD PRESSURE: 116 MMHG | WEIGHT: 192 LBS | HEIGHT: 65 IN | OXYGEN SATURATION: 99 %

## 2021-01-20 DIAGNOSIS — F32.1 CURRENT MODERATE EPISODE OF MAJOR DEPRESSIVE DISORDER WITHOUT PRIOR EPISODE (HCC): ICD-10-CM

## 2021-01-20 DIAGNOSIS — R20.0 NUMBNESS AND TINGLING: ICD-10-CM

## 2021-01-20 DIAGNOSIS — R76.8 HIGH TOTAL SERUM IGA: ICD-10-CM

## 2021-01-20 DIAGNOSIS — M54.2 NECK PAIN: Primary | ICD-10-CM

## 2021-01-20 DIAGNOSIS — R20.2 NUMBNESS AND TINGLING: ICD-10-CM

## 2021-01-20 PROCEDURE — 3074F SYST BP LT 130 MM HG: CPT | Performed by: FAMILY MEDICINE

## 2021-01-20 PROCEDURE — 3008F BODY MASS INDEX DOCD: CPT | Performed by: FAMILY MEDICINE

## 2021-01-20 PROCEDURE — 99214 OFFICE O/P EST MOD 30 MIN: CPT | Performed by: FAMILY MEDICINE

## 2021-01-20 PROCEDURE — 3078F DIAST BP <80 MM HG: CPT | Performed by: FAMILY MEDICINE

## 2021-01-20 RX ORDER — GABAPENTIN 100 MG/1
CAPSULE ORAL
Qty: 90 CAPSULE | Refills: 0 | Status: SHIPPED | OUTPATIENT
Start: 2021-01-20 | End: 2021-01-21

## 2021-01-20 RX ORDER — BUPROPION HYDROCHLORIDE 450 MG/1
450 TABLET, FILM COATED, EXTENDED RELEASE ORAL DAILY
Qty: 90 TABLET | Refills: 0 | Status: SHIPPED | OUTPATIENT
Start: 2021-01-20 | End: 2021-01-26

## 2021-01-20 NOTE — PROGRESS NOTES
CC:  Patient presents with:  Neck Pain  Numbness  Results      HPI: 38year old female here to follow-up on recent work-up for possible MS. Reports her symptoms have been getting worse since October.   Has bad neck pain, chronic fatigue, numbness/tingling Diagnosis Date   • PCOS (polycystic ovarian syndrome)        Social History    Socioeconomic History      Marital status: Single      Spouse name: Not on file      Number of children: Not on file      Years of education: Not on file      Highest educatio Asked        Seat Belt: Not Asked        Special Diet: Not Asked        Stress Concern: Not Asked        Weight Concern: Not Asked    Social History Narrative      No h/o of abuse            Lives with son       Current Outpatient Medications   Medication will also trial Gabapentin for radicular pain as Medrol not very helpful  - Will start Gabapentin 300 mg at bedtime and titrate up dose amount and frequency as needed and tolerated     2.  Numbness and tingling    - Start Gabapentin and follow-up with neuro

## 2021-01-21 ENCOUNTER — TELEPHONE (OUTPATIENT)
Dept: FAMILY MEDICINE CLINIC | Facility: CLINIC | Age: 36
End: 2021-01-21

## 2021-01-21 ENCOUNTER — TELEPHONE (OUTPATIENT)
Dept: NEUROLOGY | Facility: CLINIC | Age: 36
End: 2021-01-21

## 2021-01-21 LAB
ACETYLCHOLINE BINDING AB: 0 NMOL/L
ALBUMIN INDEX: 4.3 RATIO
ALBUMIN, CSF: 19 MG/DL
ALBUMIN, SERUM/PLASMA, NEPH: 4420 MG/DL
AQUAPORIN-4 RECEPTOR ANTIBODY: <1.5 U/ML
CSF IGG SYNTHESIS RATE: <0 MG/D
CSF IGG/ALBUMIN RATIO: 0.12 RATIO
CSF OLIGOCLONAL BANDS NUMBER: 5 BANDS
CSF OLIGOCLONAL BANDS: POSITIVE
GANGLIONIC ACETYLCHOLINE RECEPTOR AB: 2.4 PMOL/L
GLUTAMIC ACID DECARBOXYLASE AB: <5 IU/ML
IGG INDEX: 0.61 RATIO
IMMUNOGLOBULIN G CSF: 2.2 MG/DL
IMMUNOGLOBULIN G: 837 MG/DL
N-TYPE CALCIUM CHANNEL ANTIBODY: 0 PMOL/L
NEURONAL ANTIBODY (AMPHIPHYSIN): NEGATIVE
P/Q-TYPE CALCIUM CHANNEL ANTIBODY: 0 PMOL/L
SOX1 ANTIBODY, IGG BY IMMUNOBLOT, SERUM: NEGATIVE
TITIN ANTIBODY: <0.09 IV
VOLTAGE-GATED POTASSIUM CHANNEL: 0 PMOL/L

## 2021-01-21 RX ORDER — GABAPENTIN 100 MG/1
300 CAPSULE ORAL NIGHTLY
Qty: 90 CAPSULE | Refills: 0 | COMMUNITY
Start: 2021-01-21 | End: 2021-01-26 | Stop reason: ALTCHOICE

## 2021-01-21 NOTE — TELEPHONE ENCOUNTER
Called pharmacist and clarified directions. Patient also aware she is titrating the dose as needed and tolerated.

## 2021-01-21 NOTE — TELEPHONE ENCOUNTER
Pt calling, states [facility] told her they have some Questions about the directions on gabapentin 100 MG Oral Cap. Pt would like us to call [facility] to address issue.

## 2021-01-21 NOTE — TELEPHONE ENCOUNTER
gabapentin 100 MG Oral Cap 90 capsule 0 1/20/2021    Sig:   Start at bedtime and increase as needed and tolerated     Route:   (none)     Order #:   [id number]       - Will start Gabapentin 300 mg at bedtime and titrate up dose amount and frequency as neede

## 2021-01-21 NOTE — TELEPHONE ENCOUNTER
----- Message from Cher James MD sent at 1/21/2021  1:42 PM CST -----  Please let the patient know that results CSF studies seems to be confirming the possibility of multiple sclerosis.   Have her follow-up in the clinic for more extensive discuss

## 2021-01-21 NOTE — TELEPHONE ENCOUNTER
Spoke to patient, relayed message from Dr Tyrese Martínez dated 1/19/21. Patient saw Dr Gema Bernard yesterday and given referral for hematology.

## 2021-01-21 NOTE — TELEPHONE ENCOUNTER
----- Message from Mumtaz Barr MD sent at 1/19/2021 12:23 PM CST -----  Please let the patient know that results of these particular lab tests so far were normal.  However we are still waiting for another test from CSF    Thank you

## 2021-01-22 ENCOUNTER — TELEPHONE (OUTPATIENT)
Dept: NEUROLOGY | Facility: CLINIC | Age: 36
End: 2021-01-22

## 2021-01-22 ENCOUNTER — OFFICE VISIT (OUTPATIENT)
Dept: NEUROLOGY | Facility: CLINIC | Age: 36
End: 2021-01-22

## 2021-01-22 ENCOUNTER — LAB ENCOUNTER (OUTPATIENT)
Dept: LAB | Facility: HOSPITAL | Age: 36
End: 2021-01-22
Attending: Other
Payer: COMMERCIAL

## 2021-01-22 VITALS
DIASTOLIC BLOOD PRESSURE: 70 MMHG | WEIGHT: 185 LBS | RESPIRATION RATE: 16 BRPM | SYSTOLIC BLOOD PRESSURE: 120 MMHG | HEART RATE: 76 BPM | BODY MASS INDEX: 30.82 KG/M2 | HEIGHT: 65 IN

## 2021-01-22 DIAGNOSIS — G35 MS (MULTIPLE SCLEROSIS) (HCC): Primary | ICD-10-CM

## 2021-01-22 DIAGNOSIS — G35 MS (MULTIPLE SCLEROSIS) (HCC): ICD-10-CM

## 2021-01-22 LAB
HBV CORE AB SERPL QL IA: NONREACTIVE
HBV SURFACE AB SER QL: REACTIVE
HBV SURFACE AB SERPL IA-ACNC: 42.36 MIU/ML
HBV SURFACE AG SER-ACNC: <0.1 [IU]/L
HBV SURFACE AG SERPL QL IA: NONREACTIVE

## 2021-01-22 PROCEDURE — 86704 HEP B CORE ANTIBODY TOTAL: CPT

## 2021-01-22 PROCEDURE — 82306 VITAMIN D 25 HYDROXY: CPT

## 2021-01-22 PROCEDURE — 3008F BODY MASS INDEX DOCD: CPT | Performed by: OTHER

## 2021-01-22 PROCEDURE — 80500 HEPATITIS B PROFILE: CPT

## 2021-01-22 PROCEDURE — 86706 HEP B SURFACE ANTIBODY: CPT

## 2021-01-22 PROCEDURE — 3074F SYST BP LT 130 MM HG: CPT | Performed by: OTHER

## 2021-01-22 PROCEDURE — 99215 OFFICE O/P EST HI 40 MIN: CPT | Performed by: OTHER

## 2021-01-22 PROCEDURE — 36415 COLL VENOUS BLD VENIPUNCTURE: CPT

## 2021-01-22 PROCEDURE — 87340 HEPATITIS B SURFACE AG IA: CPT

## 2021-01-22 PROCEDURE — 3078F DIAST BP <80 MM HG: CPT | Performed by: OTHER

## 2021-01-22 RX ORDER — CYCLOBENZAPRINE HCL 10 MG
10 TABLET ORAL 3 TIMES DAILY PRN
Qty: 90 TABLET | Refills: 3 | Status: SHIPPED | OUTPATIENT
Start: 2021-01-22 | End: 2021-03-30

## 2021-01-22 RX ORDER — AMITRIPTYLINE HYDROCHLORIDE 25 MG/1
TABLET, FILM COATED ORAL
Qty: 90 TABLET | Refills: 3 | Status: SHIPPED | OUTPATIENT
Start: 2021-01-22 | End: 2021-02-03 | Stop reason: ALTCHOICE

## 2021-01-22 NOTE — PROGRESS NOTES
Neurology Initial Visit     Referred By: Dr. Patton ref.  provider found    Chief Complaint: Patient presents with:  Neck Pain: Patient here for follow up for recent diagnosis of MS per recent  MRI       HPI:     Mary Ruano is a 38year old female, Drug use:  7 times per week   Types: Cannabis   Comment: for sleep       Family History   Problem Relation Age of Onset   • Skin cancer Mother    • Colon Cancer Maternal Grandmother         48   • Colon Cancer Maternal Grandfather         48 comprehension, naming, repetition, vocabulary    Cranial Nerves:    VII. Face symmetric, no facial weakness  VIII. Hearing intact to whisper. IX. Pallet elevates symmetrically. XI. Shoulder shrug is intact  XII.  Tongue is midline    Motor Exam:  Muscle t MD

## 2021-01-22 NOTE — TELEPHONE ENCOUNTER
Submitted request to [facility] for IV SoluMedrol CPT     Status: pending [facility] clinical review

## 2021-01-22 NOTE — PROGRESS NOTES
Solumedrol order faxed to [facility] with insurance, medlist and face sheet. Ocrevus start forms completed and faxed.     Above paperwork in neurology paperwork hold bin

## 2021-01-24 ENCOUNTER — HOSPITAL ENCOUNTER (EMERGENCY)
Facility: HOSPITAL | Age: 36
Discharge: HOME | End: 2021-01-24
Attending: EMERGENCY MEDICINE
Payer: COMMERCIAL

## 2021-01-24 VITALS
DIASTOLIC BLOOD PRESSURE: 79 MMHG | BODY MASS INDEX: 30.82 KG/M2 | SYSTOLIC BLOOD PRESSURE: 124 MMHG | WEIGHT: 185 LBS | HEART RATE: 60 BPM | OXYGEN SATURATION: 97 % | TEMPERATURE: 97 F | RESPIRATION RATE: 18 BRPM | HEIGHT: 65 IN

## 2021-01-24 DIAGNOSIS — G44.209 TENSION HEADACHE: Primary | ICD-10-CM

## 2021-01-24 DIAGNOSIS — G35 MULTIPLE SCLEROSIS (HCC): ICD-10-CM

## 2021-01-24 PROCEDURE — 96376 TX/PRO/DX INJ SAME DRUG ADON: CPT

## 2021-01-24 PROCEDURE — 96361 HYDRATE IV INFUSION ADD-ON: CPT

## 2021-01-24 PROCEDURE — 96374 THER/PROPH/DIAG INJ IV PUSH: CPT

## 2021-01-24 PROCEDURE — 99284 EMERGENCY DEPT VISIT MOD MDM: CPT

## 2021-01-24 PROCEDURE — 96375 TX/PRO/DX INJ NEW DRUG ADDON: CPT

## 2021-01-24 RX ORDER — MORPHINE SULFATE 4 MG/ML
4 INJECTION, SOLUTION INTRAMUSCULAR; INTRAVENOUS ONCE
Status: COMPLETED | OUTPATIENT
Start: 2021-01-24 | End: 2021-01-24

## 2021-01-24 RX ORDER — KETOROLAC TROMETHAMINE 15 MG/ML
15 INJECTION, SOLUTION INTRAMUSCULAR; INTRAVENOUS ONCE
Status: COMPLETED | OUTPATIENT
Start: 2021-01-24 | End: 2021-01-24

## 2021-01-24 RX ORDER — MORPHINE SULFATE 4 MG/ML
2 INJECTION, SOLUTION INTRAMUSCULAR; INTRAVENOUS ONCE
Status: COMPLETED | OUTPATIENT
Start: 2021-01-24 | End: 2021-01-24

## 2021-01-24 RX ORDER — DEXAMETHASONE SODIUM PHOSPHATE 4 MG/ML
8 VIAL (ML) INJECTION ONCE
Status: COMPLETED | OUTPATIENT
Start: 2021-01-24 | End: 2021-01-24

## 2021-01-25 ENCOUNTER — TELEPHONE (OUTPATIENT)
Dept: NEUROLOGY | Facility: CLINIC | Age: 36
End: 2021-01-25

## 2021-01-25 ENCOUNTER — APPOINTMENT (OUTPATIENT)
Dept: PHYSICAL THERAPY | Facility: HOSPITAL | Age: 36
End: 2021-01-25
Attending: Other
Payer: COMMERCIAL

## 2021-01-25 DIAGNOSIS — G44.89 OTHER HEADACHE SYNDROME: Primary | ICD-10-CM

## 2021-01-25 DIAGNOSIS — G35 MS (MULTIPLE SCLEROSIS) (HCC): Primary | ICD-10-CM

## 2021-01-25 LAB — 25(OH)D3 SERPL-MCNC: 37 NG/ML (ref 30–100)

## 2021-01-25 RX ORDER — DIPHENHYDRAMINE HYDROCHLORIDE 50 MG/ML
25 INJECTION INTRAMUSCULAR; INTRAVENOUS ONCE
Status: CANCELLED | OUTPATIENT
Start: 2021-01-25

## 2021-01-25 RX ORDER — ACETAMINOPHEN 325 MG/1
650 TABLET ORAL ONCE
Status: CANCELLED | OUTPATIENT
Start: 2021-01-25

## 2021-01-25 RX ORDER — INDOMETHACIN 50 MG/1
50 CAPSULE ORAL 2 TIMES DAILY WITH MEALS
Qty: 14 CAPSULE | Refills: 0 | Status: SHIPPED | OUTPATIENT
Start: 2021-01-25 | End: 2021-02-01

## 2021-01-25 RX ORDER — METHYLPREDNISOLONE SODIUM SUCCINATE 125 MG/2ML
100 INJECTION, POWDER, LYOPHILIZED, FOR SOLUTION INTRAMUSCULAR; INTRAVENOUS ONCE
Status: CANCELLED | OUTPATIENT
Start: 2021-01-25

## 2021-01-25 RX ORDER — DIPHENHYDRAMINE HCL 25 MG
25 CAPSULE ORAL ONCE
Status: CANCELLED | OUTPATIENT
Start: 2021-01-25

## 2021-01-25 NOTE — ED INITIAL ASSESSMENT (HPI)
Recently dx w/ multiple sclerosis, c/o head pain for approx 1 wk, also c/o neck and low back pain. Pt reports lumbar puncture on Monday. Reports blurry vision.

## 2021-01-25 NOTE — ED PROVIDER NOTES
Patient Seen in: [facility] AND [facility] Emergency Department    History   Patient presents with:  Headache    Stated Complaint: HA hx MS    HPI  Pt c/o a 7/10 headache that began over  A week ago. Rafa Campos Headache bilateral pressure.   Patient diagnosed with ms this Smokeless tobacco: Never Used    Alcohol use:  Yes      Alcohol/week: 14.0 standard drinks      Types: 14 Standard drinks or equivalent per week      Frequency: 4 or more times a week      Drinks per session: 1 or 2    Drug use: Yes      Frequency: 7.0 time infusion. Neuro to help arrange in a.nm. Patient presenting with headache. Patient  with no abnormal symptoms for patient. Patient was discharged home.  Patient advised to return to ER if alteration in mental status, onset of fevers, visual changes, o

## 2021-01-25 NOTE — TELEPHONE ENCOUNTER
Spoke with Rukhsana at [facility]. Order for IV solumedrol is missing dose. Will check with Dr. Flaco Jaramillo regarding dose and fax new order to infusion center.

## 2021-01-25 NOTE — TELEPHONE ENCOUNTER
----- Message from Kalen Chin MD sent at 1/25/2021  9:01 AM CST -----  Please let the patient know that results of these particular lab tests so far were normal.  It seems that she was vaccinated against hepatitis B in the past.  Therefore we can

## 2021-01-25 NOTE — TELEPHONE ENCOUNTER
Receive Approval from [facility] for SoluMedrol    Order faxed to [facility] by Padmini Calvo LPN for scheduling

## 2021-01-25 NOTE — TELEPHONE ENCOUNTER
Spoke to patient and notified her of below. She was understanding and would like to move forward with Ocrevus. Order placed for Ocrevus and faxed to [facility].   Explained to patient that insurance authorization will be needed before we can move forw

## 2021-01-25 NOTE — TELEPHONE ENCOUNTER
Call the patient back; reports that yesterday she developed an 8 out of 10 throbbing holocranial headache that progressed over an hour. No new focal neurological deficits. Vision deficits and hand numbness are stable. No migrainous features.   No positio

## 2021-01-26 ENCOUNTER — PATIENT MESSAGE (OUTPATIENT)
Dept: FAMILY MEDICINE CLINIC | Facility: CLINIC | Age: 36
End: 2021-01-26

## 2021-01-26 ENCOUNTER — VIRTUAL PHONE E/M (OUTPATIENT)
Dept: FAMILY MEDICINE CLINIC | Facility: CLINIC | Age: 36
End: 2021-01-26

## 2021-01-26 ENCOUNTER — TELEPHONE (OUTPATIENT)
Dept: NEUROLOGY | Facility: CLINIC | Age: 36
End: 2021-01-26

## 2021-01-26 ENCOUNTER — TELEPHONE (OUTPATIENT)
Dept: FAMILY MEDICINE CLINIC | Facility: CLINIC | Age: 36
End: 2021-01-26

## 2021-01-26 DIAGNOSIS — R03.0 ELEVATED BLOOD PRESSURE READING: ICD-10-CM

## 2021-01-26 DIAGNOSIS — G35 MULTIPLE SCLEROSIS (HCC): Primary | ICD-10-CM

## 2021-01-26 DIAGNOSIS — R42 DIZZINESS: ICD-10-CM

## 2021-01-26 PROCEDURE — 99442 PHONE E/M BY PHYS 11-20 MIN: CPT | Performed by: FAMILY MEDICINE

## 2021-01-26 RX ORDER — BUPROPION HYDROCHLORIDE 450 MG/1
TABLET, FILM COATED, EXTENDED RELEASE ORAL
COMMUNITY
Start: 2021-01-26 | End: 2021-02-01 | Stop reason: ALTCHOICE

## 2021-01-26 NOTE — TELEPHONE ENCOUNTER
Pt is having high blood pressure and dizziness since taking this medication having trouble walking and pressure in her arm   Please call back             indomethacin 50 MG Oral Cap

## 2021-01-26 NOTE — TELEPHONE ENCOUNTER
Submitted request to [facility] for IV SoluMedrol CPT     Status: pending [facility] clinical review

## 2021-01-26 NOTE — PROGRESS NOTES
Virtual Telephone Check-In    Josias Matias verbally consents to a Virtual/Telephone Check-In visit on 01/26/21. Patient has been referred to the [facility] website at [email or web address] to review the yearly Consent to Treat document.     Patient un days, then stop for one day, and then start Amitriptyline at 25 mg nightly with ramping instructions provided by neurologist.  Severiano Candle to continue prn Indomethacin but will monitor BP closely and call back if continuing to be elevated and try to limit NSAID u

## 2021-01-26 NOTE — TELEPHONE ENCOUNTER
Called pt and informed that Dr. Juan Ramirez will do telephone visit today. Pt verbalized understanding.

## 2021-01-26 NOTE — TELEPHONE ENCOUNTER
Called pt and c/o elevated 127/87 and 147/85. Pt wants to discuss with Dr. Soumya Perez. Informed pt that need to speak with Dr. Marquis Latham who Rx the below medication. Per pt never meet him and went to the ED on 01/24/21.   Explained to pt that he is a covering physi

## 2021-01-27 ENCOUNTER — OFFICE VISIT (OUTPATIENT)
Dept: HEMATOLOGY/ONCOLOGY | Facility: HOSPITAL | Age: 36
End: 2021-01-27
Attending: Other
Payer: COMMERCIAL

## 2021-01-27 ENCOUNTER — TELEPHONE (OUTPATIENT)
Dept: NEUROLOGY | Facility: CLINIC | Age: 36
End: 2021-01-27

## 2021-01-27 ENCOUNTER — APPOINTMENT (OUTPATIENT)
Dept: PHYSICAL THERAPY | Facility: HOSPITAL | Age: 36
End: 2021-01-27
Attending: Other
Payer: COMMERCIAL

## 2021-01-27 VITALS
DIASTOLIC BLOOD PRESSURE: 72 MMHG | SYSTOLIC BLOOD PRESSURE: 125 MMHG | RESPIRATION RATE: 16 BRPM | TEMPERATURE: 98 F | HEART RATE: 65 BPM | OXYGEN SATURATION: 99 %

## 2021-01-27 DIAGNOSIS — G35 MULTIPLE SCLEROSIS (HCC): Primary | ICD-10-CM

## 2021-01-27 PROCEDURE — 96365 THER/PROPH/DIAG IV INF INIT: CPT

## 2021-01-27 RX ORDER — DIPHENHYDRAMINE HYDROCHLORIDE 50 MG/ML
25 INJECTION INTRAMUSCULAR; INTRAVENOUS ONCE
Status: CANCELLED | OUTPATIENT
Start: 2021-01-28

## 2021-01-27 RX ORDER — ACETAMINOPHEN 325 MG/1
650 TABLET ORAL ONCE
Status: CANCELLED | OUTPATIENT
Start: 2021-01-28

## 2021-01-27 RX ORDER — METHYLPREDNISOLONE SODIUM SUCCINATE 125 MG/2ML
100 INJECTION, POWDER, LYOPHILIZED, FOR SOLUTION INTRAMUSCULAR; INTRAVENOUS ONCE
Status: CANCELLED | OUTPATIENT
Start: 2021-01-28

## 2021-01-27 RX ORDER — DIPHENHYDRAMINE HCL 25 MG
25 CAPSULE ORAL ONCE
Status: CANCELLED | OUTPATIENT
Start: 2021-01-28

## 2021-01-27 NOTE — TELEPHONE ENCOUNTER
From: Ariane Party  To: Kalee Portillo DO  Sent: 1/26/2021 2:25 PM CST  Subject: Non-Urgent Medical Question    I wanted to check with you on my blood pressure. I was in the ER Matthieu night and it was 155.  Marquez was worried so he bought a blood press

## 2021-01-27 NOTE — TELEPHONE ENCOUNTER
Called and spoke with the patient. She will stop indomethacin and will increase the dose of amitriptyline the meantime.

## 2021-01-27 NOTE — PROGRESS NOTES
Ambulatory arrival to dept for day 1 of 5 doses of Solumedrol for new diagnosis of MS. Pt will begin Ocrevus after solumedrol series. PIV placed to right hand vein. Solumedrol given over 1 hour and pt tolerated well. PIV removed, dsg to site.  Sandra Sawyer

## 2021-01-28 ENCOUNTER — OFFICE VISIT (OUTPATIENT)
Dept: HEMATOLOGY/ONCOLOGY | Facility: HOSPITAL | Age: 36
End: 2021-01-28
Attending: Other
Payer: COMMERCIAL

## 2021-01-28 VITALS
SYSTOLIC BLOOD PRESSURE: 133 MMHG | DIASTOLIC BLOOD PRESSURE: 65 MMHG | HEART RATE: 68 BPM | TEMPERATURE: 98 F | OXYGEN SATURATION: 97 % | RESPIRATION RATE: 16 BRPM

## 2021-01-28 DIAGNOSIS — G35 MULTIPLE SCLEROSIS (HCC): Primary | ICD-10-CM

## 2021-01-28 PROCEDURE — 96365 THER/PROPH/DIAG IV INF INIT: CPT

## 2021-01-28 RX ORDER — METHYLPREDNISOLONE SODIUM SUCCINATE 125 MG/2ML
100 INJECTION, POWDER, LYOPHILIZED, FOR SOLUTION INTRAMUSCULAR; INTRAVENOUS ONCE
Status: CANCELLED | OUTPATIENT
Start: 2021-01-29

## 2021-01-28 RX ORDER — ACETAMINOPHEN 325 MG/1
650 TABLET ORAL ONCE
Status: CANCELLED | OUTPATIENT
Start: 2021-01-29

## 2021-01-28 RX ORDER — DIPHENHYDRAMINE HYDROCHLORIDE 50 MG/ML
25 INJECTION INTRAMUSCULAR; INTRAVENOUS ONCE
Status: CANCELLED | OUTPATIENT
Start: 2021-01-29

## 2021-01-28 RX ORDER — DIPHENHYDRAMINE HCL 25 MG
25 CAPSULE ORAL ONCE
Status: CANCELLED | OUTPATIENT
Start: 2021-01-29

## 2021-01-28 NOTE — PROGRESS NOTES
Ambulatory arrival to dept for day 2 of 5 doses of Solumedrol for new diagnosis of MS. Pt will begin Ocrevus after solumedrol series. States falir up symptoms include numbness to hands, blurry vision, and head pain.  Feeling mild improvements after first do

## 2021-01-29 ENCOUNTER — OFFICE VISIT (OUTPATIENT)
Dept: HEMATOLOGY/ONCOLOGY | Facility: HOSPITAL | Age: 36
End: 2021-01-29
Attending: Other
Payer: COMMERCIAL

## 2021-01-29 VITALS
RESPIRATION RATE: 16 BRPM | DIASTOLIC BLOOD PRESSURE: 70 MMHG | SYSTOLIC BLOOD PRESSURE: 126 MMHG | TEMPERATURE: 98 F | HEART RATE: 68 BPM | OXYGEN SATURATION: 98 %

## 2021-01-29 DIAGNOSIS — G35 MULTIPLE SCLEROSIS (HCC): Primary | ICD-10-CM

## 2021-01-29 PROCEDURE — 96365 THER/PROPH/DIAG IV INF INIT: CPT

## 2021-01-29 RX ORDER — DIPHENHYDRAMINE HYDROCHLORIDE 50 MG/ML
25 INJECTION INTRAMUSCULAR; INTRAVENOUS ONCE
Status: CANCELLED | OUTPATIENT
Start: 2021-01-30

## 2021-01-29 RX ORDER — METHYLPREDNISOLONE SODIUM SUCCINATE 125 MG/2ML
100 INJECTION, POWDER, LYOPHILIZED, FOR SOLUTION INTRAMUSCULAR; INTRAVENOUS ONCE
Status: CANCELLED | OUTPATIENT
Start: 2021-01-30

## 2021-01-29 RX ORDER — ACETAMINOPHEN 325 MG/1
650 TABLET ORAL ONCE
Status: CANCELLED | OUTPATIENT
Start: 2021-01-30

## 2021-01-29 RX ORDER — DIPHENHYDRAMINE HCL 25 MG
25 CAPSULE ORAL ONCE
Status: CANCELLED | OUTPATIENT
Start: 2021-01-30

## 2021-01-29 NOTE — PROGRESS NOTES
Ambulatory arrival to dept for day 3 of 5 doses of Solumedrol for new diagnosis of MS. Pt will begin Ocrevus after solumedrol series. States falir up symptoms include numbness to hands, blurry vision, and head pain.  Feeling mild improvements after first do

## 2021-01-30 ENCOUNTER — TELEPHONE (OUTPATIENT)
Dept: NEUROLOGY | Facility: CLINIC | Age: 36
End: 2021-01-30

## 2021-01-30 ENCOUNTER — MOBILE ENCOUNTER (OUTPATIENT)
Dept: NEUROLOGY | Facility: CLINIC | Age: 36
End: 2021-01-30

## 2021-01-30 DIAGNOSIS — R51.9 ACUTE INTRACTABLE HEADACHE, UNSPECIFIED HEADACHE TYPE: Primary | ICD-10-CM

## 2021-01-31 ENCOUNTER — APPOINTMENT (OUTPATIENT)
Dept: CT IMAGING | Facility: HOSPITAL | Age: 36
End: 2021-01-31
Attending: EMERGENCY MEDICINE
Payer: COMMERCIAL

## 2021-01-31 ENCOUNTER — PATIENT MESSAGE (OUTPATIENT)
Dept: NEUROLOGY | Facility: CLINIC | Age: 36
End: 2021-01-31

## 2021-01-31 ENCOUNTER — HOSPITAL ENCOUNTER (EMERGENCY)
Facility: HOSPITAL | Age: 36
Discharge: HOME | End: 2021-01-31
Attending: EMERGENCY MEDICINE
Payer: COMMERCIAL

## 2021-01-31 VITALS
OXYGEN SATURATION: 98 % | DIASTOLIC BLOOD PRESSURE: 67 MMHG | HEART RATE: 65 BPM | SYSTOLIC BLOOD PRESSURE: 102 MMHG | TEMPERATURE: 97 F | BODY MASS INDEX: 31 KG/M2 | WEIGHT: 184.94 LBS | RESPIRATION RATE: 14 BRPM

## 2021-01-31 DIAGNOSIS — R51.9 NONINTRACTABLE EPISODIC HEADACHE, UNSPECIFIED HEADACHE TYPE: Primary | ICD-10-CM

## 2021-01-31 DIAGNOSIS — R42 DIZZINESS: ICD-10-CM

## 2021-01-31 LAB
ANION GAP SERPL CALC-SCNC: 7 MMOL/L (ref 0–18)
B-HCG UR QL: NEGATIVE
BASOPHILS # BLD AUTO: 0.03 X10(3) UL (ref 0–0.2)
BASOPHILS NFR BLD AUTO: 0.2 %
BILIRUB UR QL: NEGATIVE
BUN BLD-MCNC: 15 MG/DL (ref 7–18)
BUN/CREAT SERPL: 20.5 (ref 10–20)
CALCIUM BLD-MCNC: 8.6 MG/DL (ref 8.5–10.1)
CHLORIDE SERPL-SCNC: 104 MMOL/L (ref 98–112)
CO2 SERPL-SCNC: 27 MMOL/L (ref 21–32)
COLOR UR: YELLOW
CREAT BLD-MCNC: 0.73 MG/DL
DEPRECATED RDW RBC AUTO: 39.9 FL (ref 35.1–46.3)
EOSINOPHIL # BLD AUTO: 0.27 X10(3) UL (ref 0–0.7)
EOSINOPHIL NFR BLD AUTO: 2.1 %
ERYTHROCYTE [DISTWIDTH] IN BLOOD BY AUTOMATED COUNT: 11.6 % (ref 11–15)
GLUCOSE BLD-MCNC: 90 MG/DL (ref 70–99)
GLUCOSE UR-MCNC: NEGATIVE MG/DL
HCT VFR BLD AUTO: 44.3 %
HGB BLD-MCNC: 15.4 G/DL
HGB UR QL STRIP.AUTO: NEGATIVE
IMM GRANULOCYTES # BLD AUTO: 0.06 X10(3) UL (ref 0–1)
IMM GRANULOCYTES NFR BLD: 0.5 %
KETONES UR-MCNC: NEGATIVE MG/DL
LEUKOCYTE ESTERASE UR QL STRIP.AUTO: NEGATIVE
LYMPHOCYTES # BLD AUTO: 5.38 X10(3) UL (ref 1–4)
LYMPHOCYTES NFR BLD AUTO: 42.2 %
MCH RBC QN AUTO: 32.7 PG (ref 26–34)
MCHC RBC AUTO-ENTMCNC: 34.8 G/DL (ref 31–37)
MCV RBC AUTO: 94.1 FL
MONOCYTES # BLD AUTO: 0.77 X10(3) UL (ref 0.1–1)
MONOCYTES NFR BLD AUTO: 6 %
NEUTROPHILS # BLD AUTO: 6.23 X10 (3) UL (ref 1.5–7.7)
NEUTROPHILS # BLD AUTO: 6.23 X10(3) UL (ref 1.5–7.7)
NEUTROPHILS NFR BLD AUTO: 49 %
NITRITE UR QL STRIP.AUTO: NEGATIVE
OSMOLALITY SERPL CALC.SUM OF ELEC: 286 MOSM/KG (ref 275–295)
PH UR: 5 [PH] (ref 5–8)
PLATELET # BLD AUTO: 285 10(3)UL (ref 150–450)
POTASSIUM SERPL-SCNC: 3.8 MMOL/L (ref 3.5–5.1)
PROT UR-MCNC: NEGATIVE MG/DL
RBC # BLD AUTO: 4.71 X10(6)UL
SODIUM SERPL-SCNC: 138 MMOL/L (ref 136–145)
SP GR UR STRIP: 1.01 (ref 1–1.03)
UROBILINOGEN UR STRIP-ACNC: <2
WBC # BLD AUTO: 12.7 X10(3) UL (ref 4–11)

## 2021-01-31 PROCEDURE — 93010 ELECTROCARDIOGRAM REPORT: CPT | Performed by: EMERGENCY MEDICINE

## 2021-01-31 PROCEDURE — 93005 ELECTROCARDIOGRAM TRACING: CPT

## 2021-01-31 PROCEDURE — 99284 EMERGENCY DEPT VISIT MOD MDM: CPT

## 2021-01-31 PROCEDURE — 81025 URINE PREGNANCY TEST: CPT

## 2021-01-31 PROCEDURE — 96375 TX/PRO/DX INJ NEW DRUG ADDON: CPT

## 2021-01-31 PROCEDURE — 70450 CT HEAD/BRAIN W/O DYE: CPT | Performed by: EMERGENCY MEDICINE

## 2021-01-31 PROCEDURE — 96365 THER/PROPH/DIAG IV INF INIT: CPT

## 2021-01-31 PROCEDURE — 80048 BASIC METABOLIC PNL TOTAL CA: CPT

## 2021-01-31 PROCEDURE — 96361 HYDRATE IV INFUSION ADD-ON: CPT

## 2021-01-31 PROCEDURE — 85025 COMPLETE CBC W/AUTO DIFF WBC: CPT

## 2021-01-31 PROCEDURE — 85025 COMPLETE CBC W/AUTO DIFF WBC: CPT | Performed by: EMERGENCY MEDICINE

## 2021-01-31 PROCEDURE — 81003 URINALYSIS AUTO W/O SCOPE: CPT | Performed by: EMERGENCY MEDICINE

## 2021-01-31 PROCEDURE — 80048 BASIC METABOLIC PNL TOTAL CA: CPT | Performed by: EMERGENCY MEDICINE

## 2021-01-31 RX ORDER — METOCLOPRAMIDE HYDROCHLORIDE 5 MG/ML
5 INJECTION INTRAMUSCULAR; INTRAVENOUS ONCE
Status: COMPLETED | OUTPATIENT
Start: 2021-01-31 | End: 2021-01-31

## 2021-01-31 RX ORDER — DIPHENHYDRAMINE HYDROCHLORIDE 50 MG/ML
12.5 INJECTION INTRAMUSCULAR; INTRAVENOUS ONCE
Status: COMPLETED | OUTPATIENT
Start: 2021-01-31 | End: 2021-01-31

## 2021-01-31 RX ORDER — LORAZEPAM 2 MG/ML
0.5 INJECTION INTRAMUSCULAR ONCE
Status: COMPLETED | OUTPATIENT
Start: 2021-01-31 | End: 2021-01-31

## 2021-01-31 RX ORDER — VALPROIC ACID 250 MG/1
250 CAPSULE, LIQUID FILLED ORAL 2 TIMES DAILY PRN
Qty: 6 CAPSULE | Refills: 0 | Status: SHIPPED | OUTPATIENT
Start: 2021-01-31 | End: 2021-02-03

## 2021-01-31 NOTE — ED PROVIDER NOTES
Patient Seen in: [facility] AND [facility] Emergency Department      History   Patient presents with:  Dizziness  Headache    Stated Complaint: Head pressure, jaw pain, dizziness     HPI/Subjective:   HPI    37 y/o female w/ recent dx of MS following [address] [facility] Neurological: Alert and oriented to person, place, and time. No gross focal deficits  Skin: Skin is warm and dry. Psychiatric: Normal mood and affect. Behavior is normal. Pt tearful. Nursing note and vitals reviewed.     Differential diagnosis include control for dose reduction was used. Dose information is transmitted to the [facility] [employer]) NRDR ([address]) which includes the Dose Index Registry.   FINDINGS:  CSF SPACES: Ventricles, cisterns, and sulci are jory consistent with subacute demyelinating lesion that does not enhance. Additional smaller punctate scattered bright T2 and FLAIR signal foci in the subcortical white matter of the bilateral cerebral hemispheres.  Differential diagnosis would include post inf to, subcutaneous skin injection site hematoma/cellulitis, bleeding into the spinal canal, infection of spinal canal, headache both mild severe, and low back pain. An informed consent was obtained.   A lumbar puncture was performed in the usual sterile wheeler signs of hydrocephalus which we are not expecting although the patient is not getting better standard treatment.   We will reevaluate after the treatment work-up here and he will be contacted back to the patient saw his ongoing symptoms and seems to improve

## 2021-01-31 NOTE — ED INITIAL ASSESSMENT (HPI)
Patient here with c/o head pressure since yesterday with dizziness. Recently diagnosed with MS 2 weeks ago per patient. Patient states she has been receiving steroid infusions this past week (W/TH/F) at the cancer center.

## 2021-02-01 ENCOUNTER — APPOINTMENT (OUTPATIENT)
Dept: HEMATOLOGY/ONCOLOGY | Facility: HOSPITAL | Age: 36
End: 2021-02-01
Attending: Other
Payer: COMMERCIAL

## 2021-02-01 ENCOUNTER — TELEMEDICINE (OUTPATIENT)
Dept: FAMILY MEDICINE CLINIC | Facility: CLINIC | Age: 36
End: 2021-02-01

## 2021-02-01 ENCOUNTER — TELEPHONE (OUTPATIENT)
Dept: FAMILY MEDICINE CLINIC | Facility: CLINIC | Age: 36
End: 2021-02-01

## 2021-02-01 DIAGNOSIS — F32.2 CURRENT SEVERE EPISODE OF MAJOR DEPRESSIVE DISORDER WITHOUT PSYCHOTIC FEATURES WITHOUT PRIOR EPISODE (HCC): ICD-10-CM

## 2021-02-01 DIAGNOSIS — G35 MULTIPLE SCLEROSIS (HCC): Primary | ICD-10-CM

## 2021-02-01 PROBLEM — F32.1 CURRENT MODERATE EPISODE OF MAJOR DEPRESSIVE DISORDER WITHOUT PRIOR EPISODE (HCC): Status: ACTIVE | Noted: 2020-10-13

## 2021-02-01 PROCEDURE — 99214 OFFICE O/P EST MOD 30 MIN: CPT | Performed by: FAMILY MEDICINE

## 2021-02-01 RX ORDER — BUPROPION HYDROCHLORIDE 150 MG/1
150 TABLET ORAL DAILY
Qty: 30 TABLET | Refills: 0 | Status: SHIPPED | OUTPATIENT
Start: 2021-02-01 | End: 2021-02-05

## 2021-02-01 RX ORDER — GABAPENTIN 100 MG/1
100 CAPSULE ORAL 3 TIMES DAILY
Refills: 0 | COMMUNITY
Start: 2021-02-01 | End: 2021-02-14

## 2021-02-01 NOTE — PROGRESS NOTES
CC:  Patient presents with: Follow - Up: states she needs to talk to Dr. Martin Jeff      HPI: 38year old female presenting for video visit to follow-up on concerns regarding MS and recent symptoms.    Was told to stop the steroid treatment by Dr. Ines Sidhu Financial resource strain: Not on file      Food insecurity        Worry: Not on file        Inability: Not on file      Transportation needs        Medical: Not on file        Non-medical: Not on file    Tobacco Use      Smoking status: Former Smoker (headache). 6 capsule 0   • cyclobenzaprine 10 MG Oral Tab Take 1 tablet (10 mg total) by mouth 3 (three) times daily as needed for Muscle spasms.  90 tablet 3   • Amitriptyline HCl 25 MG Oral Tab Start with 1 pill QHS, for 1 week, then 2 pills qhs foor ano plans  - Concern Amitriptyline may have triggered or worsened this so will recommend she stop and resume Bupropion that she was doing well on in the past  - Also needs a psychologist and psychiatrist ASAP and Bernard Saldana contacted to arrange this and patie

## 2021-02-01 NOTE — CM/SW NOTE
Follow up ED visit appointment made with Dr Wild Hurtado on February 3rd at 0830am, [address] [facility] [address] [facility] [address] [address]. The pt was called and provided the above information.

## 2021-02-01 NOTE — CM/SW NOTE
CM team will call Dr Merlinda Barb neuro on Monday to schedule follow up appointment for patient    CM team will notify patient of appointment date and time

## 2021-02-01 NOTE — TELEPHONE ENCOUNTER
Patient was in the ER last night currently in a lot of pain. Recommended to be seen soon as possible, but no appointments available.

## 2021-02-01 NOTE — TELEPHONE ENCOUNTER
RN returned pt's call. Pt confirms going to ED yesterday for severe headache. Pt states that after speaking to Neurologist, they will no longer be doing the steroid therapy.  Pt is tearful and anxious, requesting to speak to Dr Angelique Roy regarding treatment fatmata

## 2021-02-02 ENCOUNTER — APPOINTMENT (OUTPATIENT)
Dept: HEMATOLOGY/ONCOLOGY | Facility: HOSPITAL | Age: 36
End: 2021-02-02
Attending: Other
Payer: COMMERCIAL

## 2021-02-03 ENCOUNTER — TELEPHONE (OUTPATIENT)
Dept: FAMILY MEDICINE CLINIC | Facility: CLINIC | Age: 36
End: 2021-02-03

## 2021-02-03 ENCOUNTER — HOSPITAL ENCOUNTER (EMERGENCY)
Facility: HOSPITAL | Age: 36
Discharge: HOME | End: 2021-02-03
Payer: COMMERCIAL

## 2021-02-03 ENCOUNTER — ANESTHESIA EVENT (OUTPATIENT)
Dept: EMERGENCY DEPT | Facility: HOSPITAL | Age: 36
End: 2021-02-03
Payer: COMMERCIAL

## 2021-02-03 ENCOUNTER — ANESTHESIA (OUTPATIENT)
Dept: EMERGENCY DEPT | Facility: HOSPITAL | Age: 36
End: 2021-02-03
Payer: COMMERCIAL

## 2021-02-03 VITALS
DIASTOLIC BLOOD PRESSURE: 77 MMHG | SYSTOLIC BLOOD PRESSURE: 115 MMHG | BODY MASS INDEX: 30.82 KG/M2 | HEART RATE: 63 BPM | RESPIRATION RATE: 20 BRPM | WEIGHT: 185 LBS | HEIGHT: 65 IN | OXYGEN SATURATION: 98 % | TEMPERATURE: 98 F

## 2021-02-03 DIAGNOSIS — R51.9 ACUTE INTRACTABLE HEADACHE, UNSPECIFIED HEADACHE TYPE: Primary | ICD-10-CM

## 2021-02-03 DIAGNOSIS — G35 MULTIPLE SCLEROSIS (HCC): Primary | ICD-10-CM

## 2021-02-03 LAB
ALBUMIN SERPL-MCNC: 3.4 G/DL (ref 3.4–5)
ALBUMIN/GLOB SERPL: 1 {RATIO} (ref 1–2)
ALP LIVER SERPL-CCNC: 54 U/L
ALT SERPL-CCNC: 50 U/L
ANION GAP SERPL CALC-SCNC: 8 MMOL/L (ref 0–18)
AST SERPL-CCNC: 16 U/L (ref 15–37)
BASOPHILS # BLD AUTO: 0.05 X10(3) UL (ref 0–0.2)
BASOPHILS NFR BLD AUTO: 0.5 %
BILIRUB SERPL-MCNC: 0.2 MG/DL (ref 0.1–2)
BUN BLD-MCNC: 5 MG/DL (ref 7–18)
BUN/CREAT SERPL: 7.6 (ref 10–20)
CALCIUM BLD-MCNC: 9 MG/DL (ref 8.5–10.1)
CHLORIDE SERPL-SCNC: 106 MMOL/L (ref 98–112)
CO2 SERPL-SCNC: 26 MMOL/L (ref 21–32)
CREAT BLD-MCNC: 0.66 MG/DL
DEPRECATED RDW RBC AUTO: 39.8 FL (ref 35.1–46.3)
EOSINOPHIL # BLD AUTO: 0.22 X10(3) UL (ref 0–0.7)
EOSINOPHIL NFR BLD AUTO: 2.1 %
ERYTHROCYTE [DISTWIDTH] IN BLOOD BY AUTOMATED COUNT: 11.6 % (ref 11–15)
GLOBULIN PLAS-MCNC: 3.4 G/DL (ref 2.8–4.4)
GLUCOSE BLD-MCNC: 105 MG/DL (ref 70–99)
HCT VFR BLD AUTO: 43.6 %
HGB BLD-MCNC: 15.1 G/DL
IMM GRANULOCYTES # BLD AUTO: 0.07 X10(3) UL (ref 0–1)
IMM GRANULOCYTES NFR BLD: 0.7 %
LYMPHOCYTES # BLD AUTO: 4.1 X10(3) UL (ref 1–4)
LYMPHOCYTES NFR BLD AUTO: 38.8 %
M PROTEIN MFR SERPL ELPH: 6.8 G/DL (ref 6.4–8.2)
MCH RBC QN AUTO: 32.8 PG (ref 26–34)
MCHC RBC AUTO-ENTMCNC: 34.6 G/DL (ref 31–37)
MCV RBC AUTO: 94.8 FL
MONOCYTES # BLD AUTO: 0.72 X10(3) UL (ref 0.1–1)
MONOCYTES NFR BLD AUTO: 6.8 %
NEUTROPHILS # BLD AUTO: 5.42 X10 (3) UL (ref 1.5–7.7)
NEUTROPHILS # BLD AUTO: 5.42 X10(3) UL (ref 1.5–7.7)
NEUTROPHILS NFR BLD AUTO: 51.1 %
OSMOLALITY SERPL CALC.SUM OF ELEC: 288 MOSM/KG (ref 275–295)
PLATELET # BLD AUTO: 260 10(3)UL (ref 150–450)
POTASSIUM SERPL-SCNC: 3.6 MMOL/L (ref 3.5–5.1)
RBC # BLD AUTO: 4.6 X10(6)UL
SODIUM SERPL-SCNC: 140 MMOL/L (ref 136–145)
WBC # BLD AUTO: 10.6 X10(3) UL (ref 4–11)

## 2021-02-03 PROCEDURE — 85025 COMPLETE CBC W/AUTO DIFF WBC: CPT | Performed by: NURSE PRACTITIONER

## 2021-02-03 PROCEDURE — 99285 EMERGENCY DEPT VISIT HI MDM: CPT

## 2021-02-03 PROCEDURE — 96361 HYDRATE IV INFUSION ADD-ON: CPT

## 2021-02-03 PROCEDURE — 96375 TX/PRO/DX INJ NEW DRUG ADDON: CPT

## 2021-02-03 PROCEDURE — 80053 COMPREHEN METABOLIC PANEL: CPT | Performed by: NURSE PRACTITIONER

## 2021-02-03 PROCEDURE — 96374 THER/PROPH/DIAG INJ IV PUSH: CPT

## 2021-02-03 PROCEDURE — 62273 INJECT EPIDURAL PATCH: CPT

## 2021-02-03 PROCEDURE — 62273 INJECT EPIDURAL PATCH: CPT | Performed by: ANESTHESIOLOGY

## 2021-02-03 RX ORDER — DIPHENHYDRAMINE HYDROCHLORIDE 50 MG/ML
25 INJECTION INTRAMUSCULAR; INTRAVENOUS ONCE
Status: COMPLETED | OUTPATIENT
Start: 2021-02-03 | End: 2021-02-03

## 2021-02-03 RX ORDER — LIDOCAINE HYDROCHLORIDE 10 MG/ML
INJECTION, SOLUTION INFILTRATION; PERINEURAL
Status: COMPLETED | OUTPATIENT
Start: 2021-02-03 | End: 2021-02-03

## 2021-02-03 RX ORDER — METOCLOPRAMIDE HYDROCHLORIDE 5 MG/ML
10 INJECTION INTRAMUSCULAR; INTRAVENOUS ONCE
Status: COMPLETED | OUTPATIENT
Start: 2021-02-03 | End: 2021-02-03

## 2021-02-03 RX ADMIN — LIDOCAINE HYDROCHLORIDE 5 ML: 10 INJECTION, SOLUTION INFILTRATION; PERINEURAL at 17:40:00

## 2021-02-03 NOTE — TELEPHONE ENCOUNTER
Called patient to see how she is doing she states her head pressure is just constant and it will not go away. Other than that signs and symptoms are ok per patient. I told the patient to be expecting a call from Dr. Karen Guevara later today.  She said thanks and sh

## 2021-02-03 NOTE — TELEPHONE ENCOUNTER
Spoke with her partner, Sunny Swift, and states she is actually in the ER now getting a blood patch to see if her headaches are related to the LP and more of a post-spinal headache.   Also had a chance to look into neurologist options and would like to try Dr. Mancia Hippo

## 2021-02-03 NOTE — TELEPHONE ENCOUNTER
RN does not see any recent communication documented with Dr Glendy Power. Pt had telemedicine visit with Dr Glendy Power yesterday 2/2/21. Rn to route to Dr Glendy Power.

## 2021-02-03 NOTE — ED PROVIDER NOTES
Patient Seen in: [facility] AND [facility] Emergency Department      History   Patient presents with:  Headache    Stated Complaint: increasing head pain s/p LP    HPI/Subjective:   34yo/f w hx of PCOS, recent diagnosis of MS reports to the ED with complaints of Temp 98.3 °F (36.8 °C) (Temporal)   Resp 20   Ht 165.1 cm (5' 5\")   Wt 83.9 kg   LMP 02/06/2017   SpO2 98%   BMI 30.79 kg/m²         Physical Exam  Vitals signs and nursing note reviewed. Constitutional:       General: She is not in acute distress. ------                     CBC W/ DIFFERENTIAL[[id number]]          Abnormal            Final result                 Please view results for these tests on the individual orders.    RAINBOW DRAW BLUE   RAINBOW DRAW LAVENDER   RAINBOW DRAW LIGHT GREEN

## 2021-02-04 NOTE — ANESTHESIA PROCEDURE NOTES
Blood Patch    Date/Time: 2/3/2021 5:40 PM  Performed by: Helga Soto MD  Authorized by: Helga Soto MD     General Information and Staff    Start Time:  2/3/2021 5:35 PM  End Time:  2/3/2021 5:50 PM  Anesthesiologist:  Danny Osullivan

## 2021-02-05 ENCOUNTER — TELEPHONE (OUTPATIENT)
Dept: PAIN CLINIC | Facility: HOSPITAL | Age: 36
End: 2021-02-05

## 2021-02-05 ENCOUNTER — TELEPHONE (OUTPATIENT)
Dept: NEUROLOGY | Facility: CLINIC | Age: 36
End: 2021-02-05

## 2021-02-05 PROBLEM — G97.1 SPINAL HEADACHE: Status: ACTIVE | Noted: 2021-02-05

## 2021-02-05 RX ORDER — CAFFEINE 200 MG
200 TABLET ORAL EVERY 4 HOURS PRN
Qty: 60 TABLET | Refills: 0 | Status: SHIPPED | OUTPATIENT
Start: 2021-02-05 | End: 2021-03-30 | Stop reason: ALTCHOICE

## 2021-02-05 NOTE — TELEPHONE ENCOUNTER
Received call from [address] regarding request for blood patch for this patient. Called patient to discuss symptoms. Patient reports lumbar puncture on 1/18/21. She reports gradual headache that worsened over the past several weeks.  Denies

## 2021-02-05 NOTE — TELEPHONE ENCOUNTER
We will suggest another blood patch, may be bilevel. Is it possible to reach out to anesthesia to set it up?   In meantime caffeine pills will be prescribed

## 2021-02-05 NOTE — TELEPHONE ENCOUNTER
Called anesthesia regarding bilevel blood patch, anesthesia will be reaching out to patient for further steps. Called and spoke with patient to advised per Dr. Felicitas Askew caffeine pills were prescribed and he is suggesting bilevel blood patch.  Patient was g

## 2021-02-05 NOTE — TELEPHONE ENCOUNTER
Nurse from pain center called back stating it is too early for bilevel blood patch. Patient will be seen by pain center Monday afternoon. Patient was contacted by pain center.

## 2021-02-08 ENCOUNTER — OFFICE VISIT (OUTPATIENT)
Dept: HEMATOLOGY/ONCOLOGY | Facility: HOSPITAL | Age: 36
End: 2021-02-08
Attending: Other
Payer: COMMERCIAL

## 2021-02-08 ENCOUNTER — TELEPHONE (OUTPATIENT)
Dept: NEUROLOGY | Facility: CLINIC | Age: 36
End: 2021-02-08

## 2021-02-08 VITALS
OXYGEN SATURATION: 97 % | RESPIRATION RATE: 16 BRPM | TEMPERATURE: 98 F | DIASTOLIC BLOOD PRESSURE: 61 MMHG | HEART RATE: 78 BPM | SYSTOLIC BLOOD PRESSURE: 109 MMHG

## 2021-02-08 DIAGNOSIS — G35 MULTIPLE SCLEROSIS (HCC): Primary | ICD-10-CM

## 2021-02-08 PROCEDURE — 96366 THER/PROPH/DIAG IV INF ADDON: CPT

## 2021-02-08 PROCEDURE — 96376 TX/PRO/DX INJ SAME DRUG ADON: CPT

## 2021-02-08 PROCEDURE — 96375 TX/PRO/DX INJ NEW DRUG ADDON: CPT

## 2021-02-08 PROCEDURE — 96365 THER/PROPH/DIAG IV INF INIT: CPT

## 2021-02-08 RX ORDER — ACETAMINOPHEN 325 MG/1
TABLET ORAL
Status: COMPLETED
Start: 2021-02-08 | End: 2021-02-08

## 2021-02-08 RX ORDER — DIPHENHYDRAMINE HYDROCHLORIDE 50 MG/ML
INJECTION INTRAMUSCULAR; INTRAVENOUS
Status: COMPLETED
Start: 2021-02-08 | End: 2021-02-08

## 2021-02-08 RX ORDER — METHYLPREDNISOLONE SODIUM SUCCINATE 125 MG/2ML
INJECTION, POWDER, LYOPHILIZED, FOR SOLUTION INTRAMUSCULAR; INTRAVENOUS
Status: COMPLETED
Start: 2021-02-08 | End: 2021-02-08

## 2021-02-08 RX ORDER — DIPHENHYDRAMINE HCL 25 MG
25 CAPSULE ORAL ONCE
Status: CANCELLED | OUTPATIENT
Start: 2021-02-09

## 2021-02-08 RX ORDER — ACETAMINOPHEN 325 MG/1
650 TABLET ORAL ONCE
Status: COMPLETED | OUTPATIENT
Start: 2021-02-08 | End: 2021-02-08

## 2021-02-08 RX ORDER — METHYLPREDNISOLONE SODIUM SUCCINATE 125 MG/2ML
100 INJECTION, POWDER, LYOPHILIZED, FOR SOLUTION INTRAMUSCULAR; INTRAVENOUS ONCE
Status: CANCELLED | OUTPATIENT
Start: 2021-02-09

## 2021-02-08 RX ORDER — DIPHENHYDRAMINE HYDROCHLORIDE 50 MG/ML
25 INJECTION INTRAMUSCULAR; INTRAVENOUS ONCE
Status: COMPLETED | OUTPATIENT
Start: 2021-02-08 | End: 2021-02-08

## 2021-02-08 RX ORDER — DIPHENHYDRAMINE HYDROCHLORIDE 50 MG/ML
25 INJECTION INTRAMUSCULAR; INTRAVENOUS ONCE
Status: CANCELLED | OUTPATIENT
Start: 2021-02-09

## 2021-02-08 RX ORDER — METHYLPREDNISOLONE SODIUM SUCCINATE 125 MG/2ML
100 INJECTION, POWDER, LYOPHILIZED, FOR SOLUTION INTRAMUSCULAR; INTRAVENOUS ONCE
Status: COMPLETED | OUTPATIENT
Start: 2021-02-08 | End: 2021-02-08

## 2021-02-08 RX ORDER — ACETAMINOPHEN 325 MG/1
650 TABLET ORAL ONCE
Status: CANCELLED | OUTPATIENT
Start: 2021-02-09

## 2021-02-08 RX ADMIN — METHYLPREDNISOLONE SODIUM SUCCINATE 100 MG: 125 INJECTION, POWDER, LYOPHILIZED, FOR SOLUTION INTRAMUSCULAR; INTRAVENOUS at 10:01:00

## 2021-02-08 RX ADMIN — DIPHENHYDRAMINE HYDROCHLORIDE 25 MG: 50 INJECTION INTRAMUSCULAR; INTRAVENOUS at 13:25:00

## 2021-02-08 RX ADMIN — DIPHENHYDRAMINE HYDROCHLORIDE 25 MG: 50 INJECTION INTRAMUSCULAR; INTRAVENOUS at 09:55:00

## 2021-02-08 RX ADMIN — ACETAMINOPHEN 650 MG: 325 TABLET ORAL at 09:40:00

## 2021-02-08 NOTE — TELEPHONE ENCOUNTER
Spoke to surjit Norris to restart Ocrevus. Relayed this message to [facility] infusion center. No furhter questions at this time.

## 2021-02-08 NOTE — TELEPHONE ENCOUNTER
Spoke to [place] from infusion center. Patient is getting first dose of Ocrevus. Had throat tightening and rash to forehead and chest and back when about 3/4 through dose. Joellyn Cones stopped and given benadryl IV with symptoms resolved.  Asking if should resta

## 2021-02-08 NOTE — PATIENT INSTRUCTIONS
If you begin to get a rash again, take benadryl. If you have any symptoms of a severe reaction such as chest pain or breathing difficulty go to the ER or call 911 if needed.

## 2021-02-08 NOTE — PROGRESS NOTES
Mathieu Morales arrived to infusion area ambulating with steady gait. She is here for her first Ocrevus to treat MS. She has been experiencing headaches lately, but she feels fine this morning. Procedure explained to her.  Reviewed potential for a reaction during

## 2021-02-09 ENCOUNTER — TELEMEDICINE (OUTPATIENT)
Dept: FAMILY MEDICINE CLINIC | Facility: CLINIC | Age: 36
End: 2021-02-09

## 2021-02-09 ENCOUNTER — TELEPHONE (OUTPATIENT)
Dept: FAMILY MEDICINE CLINIC | Facility: CLINIC | Age: 36
End: 2021-02-09

## 2021-02-09 DIAGNOSIS — J98.4 MULTIPLE IDIOPATHIC PULMONARY CYSTS: ICD-10-CM

## 2021-02-09 DIAGNOSIS — F32.1 CURRENT MODERATE EPISODE OF MAJOR DEPRESSIVE DISORDER WITHOUT PRIOR EPISODE (HCC): ICD-10-CM

## 2021-02-09 DIAGNOSIS — G35 MULTIPLE SCLEROSIS (HCC): Primary | ICD-10-CM

## 2021-02-09 DIAGNOSIS — J98.4 MULTIPLE IDIOPATHIC PULMONARY CYSTS: Primary | ICD-10-CM

## 2021-02-09 PROBLEM — R91.1 PULMONARY NODULE: Status: ACTIVE | Noted: 2021-02-09

## 2021-02-09 PROCEDURE — 99214 OFFICE O/P EST MOD 30 MIN: CPT | Performed by: FAMILY MEDICINE

## 2021-02-09 RX ORDER — BUPROPION HYDROCHLORIDE 150 MG/1
150 TABLET ORAL DAILY
COMMUNITY
End: 2021-03-30 | Stop reason: ALTCHOICE

## 2021-02-09 NOTE — PROGRESS NOTES
CC:  Patient presents with: Follow - Up: patient was at [facility] ER ER yesterday      HPI: 38year old female presenting for video visit to discuss ER visit for recurrent hives and chest pain after Opdivo infusion.   Had hives during her Opdivo infusion yesterday Smoking status: Former Smoker        Packs/day: 1.00        Years: 20.00        Pack years: 20        Types: Cigarettes        Quit date: 2020        Years since quittin.2      Smokeless tobacco: Never Used    Substance and Sexual Activity      A (four) hours as needed. 60 tablet 0   • gabapentin 100 MG Oral Cap Take 1 capsule (100 mg total) by mouth 3 (three) times daily. 0   • cyclobenzaprine 10 MG Oral Tab Take 1 tablet (10 mg total) by mouth 3 (three) times daily as needed for Muscle spasms.  9 visitation. There are limitations of this visit as no physical exam could be performed. Every conscious effort was taken to allow for sufficient and adequate time. This billing was spent on reviewing labs, medications, radiology tests and decision making.

## 2021-02-09 NOTE — TELEPHONE ENCOUNTER
Patient is calling to set up a virtual appointment with Dr. Mamie Calzada. Patient states she had an allergic reaction to her MS treatment yesterday. Patient states she first started with hives and was treated at that moment.  Was told to go to the ER if she started

## 2021-02-14 ENCOUNTER — PATIENT MESSAGE (OUTPATIENT)
Dept: FAMILY MEDICINE CLINIC | Facility: CLINIC | Age: 36
End: 2021-02-14

## 2021-02-15 RX ORDER — GABAPENTIN 100 MG/1
CAPSULE ORAL
Qty: 210 CAPSULE | Refills: 1 | Status: SHIPPED | OUTPATIENT
Start: 2021-02-15 | End: 2021-03-30 | Stop reason: ALTCHOICE

## 2021-02-15 NOTE — TELEPHONE ENCOUNTER
A refill request was received for:  Requested Prescriptions     Pending Prescriptions Disp Refills   • gabapentin 100 MG Oral Cap  0     Sig: Take 1 capsule (100 mg total) by mouth 3 (three) times daily.      Last refill date: 01/21/2021  Qty: 90  Last offi

## 2021-02-15 NOTE — TELEPHONE ENCOUNTER
From: Dread Araujo  To: Maria E Pablo DO  Sent: 2/14/2021 11:02 AM CST  Subject: Prescription Jeaneth Osiel Garzon,    I will need a refill of gabapentin soon. I am taking two twice a day and three at night.     Thanks,    Geno Dillon

## 2021-02-16 ENCOUNTER — APPOINTMENT (OUTPATIENT)
Dept: HEMATOLOGY/ONCOLOGY | Facility: HOSPITAL | Age: 36
End: 2021-02-16
Attending: INTERNAL MEDICINE
Payer: COMMERCIAL

## 2021-02-19 ENCOUNTER — TELEPHONE (OUTPATIENT)
Dept: NEUROLOGY | Facility: CLINIC | Age: 36
End: 2021-02-19

## 2021-02-19 NOTE — TELEPHONE ENCOUNTER
Received call from Naomi at [facility]. Patient is due for second dose of starter Ocrevus on Monday, 2/22. She had a reaction during her last infusion and that evening went to [facility] [place], [facility] complaining of chest pain.   She has since followed up with

## 2021-02-22 ENCOUNTER — OFFICE VISIT (OUTPATIENT)
Dept: HEMATOLOGY/ONCOLOGY | Facility: HOSPITAL | Age: 36
End: 2021-02-22
Attending: Other
Payer: COMMERCIAL

## 2021-02-22 ENCOUNTER — TELEPHONE (OUTPATIENT)
Dept: NEUROLOGY | Facility: CLINIC | Age: 36
End: 2021-02-22

## 2021-02-22 VITALS
HEART RATE: 79 BPM | DIASTOLIC BLOOD PRESSURE: 71 MMHG | SYSTOLIC BLOOD PRESSURE: 140 MMHG | TEMPERATURE: 98 F | OXYGEN SATURATION: 96 % | RESPIRATION RATE: 16 BRPM

## 2021-02-22 DIAGNOSIS — G35 MULTIPLE SCLEROSIS (HCC): Primary | ICD-10-CM

## 2021-02-22 PROCEDURE — 96375 TX/PRO/DX INJ NEW DRUG ADDON: CPT

## 2021-02-22 PROCEDURE — 96366 THER/PROPH/DIAG IV INF ADDON: CPT

## 2021-02-22 PROCEDURE — 96365 THER/PROPH/DIAG IV INF INIT: CPT

## 2021-02-22 RX ORDER — ACETAMINOPHEN 325 MG/1
TABLET ORAL
Status: COMPLETED
Start: 2021-02-22 | End: 2021-02-22

## 2021-02-22 RX ORDER — DIPHENHYDRAMINE HYDROCHLORIDE 50 MG/ML
INJECTION INTRAMUSCULAR; INTRAVENOUS
Status: COMPLETED
Start: 2021-02-22 | End: 2021-02-22

## 2021-02-22 RX ORDER — METHYLPREDNISOLONE SODIUM SUCCINATE 125 MG/2ML
INJECTION, POWDER, LYOPHILIZED, FOR SOLUTION INTRAMUSCULAR; INTRAVENOUS
Status: COMPLETED
Start: 2021-02-22 | End: 2021-02-22

## 2021-02-22 RX ORDER — DIPHENHYDRAMINE HCL 25 MG
25 CAPSULE ORAL ONCE
OUTPATIENT
Start: 2021-03-08

## 2021-02-22 RX ORDER — ACETAMINOPHEN 325 MG/1
650 TABLET ORAL ONCE
Status: COMPLETED | OUTPATIENT
Start: 2021-02-22 | End: 2021-02-22

## 2021-02-22 RX ORDER — DIPHENHYDRAMINE HYDROCHLORIDE 50 MG/ML
25 INJECTION INTRAMUSCULAR; INTRAVENOUS ONCE
Status: CANCELLED | OUTPATIENT
Start: 2021-03-08

## 2021-02-22 RX ORDER — DIPHENHYDRAMINE HCL 25 MG
CAPSULE ORAL
Status: COMPLETED
Start: 2021-02-22 | End: 2021-02-22

## 2021-02-22 RX ORDER — METHYLPREDNISOLONE SODIUM SUCCINATE 125 MG/2ML
100 INJECTION, POWDER, LYOPHILIZED, FOR SOLUTION INTRAMUSCULAR; INTRAVENOUS ONCE
Status: CANCELLED | OUTPATIENT
Start: 2021-03-08

## 2021-02-22 RX ORDER — METHYLPREDNISOLONE SODIUM SUCCINATE 125 MG/2ML
100 INJECTION, POWDER, LYOPHILIZED, FOR SOLUTION INTRAMUSCULAR; INTRAVENOUS ONCE
Status: COMPLETED | OUTPATIENT
Start: 2021-02-22 | End: 2021-02-22

## 2021-02-22 RX ORDER — DIPHENHYDRAMINE HYDROCHLORIDE 50 MG/ML
25 INJECTION INTRAMUSCULAR; INTRAVENOUS ONCE
Status: COMPLETED | OUTPATIENT
Start: 2021-02-22 | End: 2021-02-22

## 2021-02-22 RX ORDER — DIPHENHYDRAMINE HCL 25 MG
25 CAPSULE ORAL ONCE
Status: DISCONTINUED | OUTPATIENT
Start: 2021-02-22 | End: 2021-02-22

## 2021-02-22 RX ORDER — ACETAMINOPHEN 325 MG/1
650 TABLET ORAL ONCE
Status: CANCELLED | OUTPATIENT
Start: 2021-03-08

## 2021-02-22 RX ADMIN — DIPHENHYDRAMINE HCL 25 MG: 25 MG CAPSULE ORAL at 13:35:00

## 2021-02-22 RX ADMIN — DIPHENHYDRAMINE HYDROCHLORIDE 25 MG: 50 INJECTION INTRAMUSCULAR; INTRAVENOUS at 09:54:00

## 2021-02-22 RX ADMIN — ACETAMINOPHEN 650 MG: 325 TABLET ORAL at 09:40:00

## 2021-02-22 RX ADMIN — METHYLPREDNISOLONE SODIUM SUCCINATE 100 MG: 125 INJECTION, POWDER, LYOPHILIZED, FOR SOLUTION INTRAMUSCULAR; INTRAVENOUS at 09:54:00

## 2021-02-22 NOTE — TELEPHONE ENCOUNTER
Spoke with Amanda, states patient's first ocrevus infusion patient broke out in hives. Patient is now getting second infusion and she is experiencing itching in her throat and face. Amanda would like to know should they continue with the infusion.  Advise

## 2021-02-22 NOTE — TELEPHONE ENCOUNTER
Spoke to Dr Waldo Ward, ok to stop Ocrevus infusion, repeat benadryl 25 mg IVP and then restart Ocrevus as done at Ocrevus  infusion 2 weeks ago.   Spoke to [facility] infusion [place], will give Benadryl 25 mg IVP now, monitor for  symptoms and restart Ocrevus at

## 2021-02-22 NOTE — PROGRESS NOTES
Maged Giron arrived to infusion area ambulating with steady gait. She is here for her second Ocrevus to treat MS. Patient did experience reaction with first dose, including rash and chest pain intermittently.  MD contacted and approved continuation with medica following infusion. She has been educated to go to ED for any signs and symptoms of chest pains or difficulty breathing-expressed understanding.

## 2021-02-22 NOTE — CONSULTS
[facility] History and Physical    Patient Name: Seth Jeffries   YOB: 1985   Medical Record Number: [id number]   CSN: [id number]   Attending Physician:  Mahogany Burch MD       Date of Visit: 2/23/2021     Chief Complaint:  Elevated IgA Oral Tablet 24 Hr, Take 150 mg by mouth daily. , Disp: , Rfl:   •  LORazepam 0.5 MG Oral Tab, Take 1 tablet (0.5 mg total) by mouth 2 (two) times daily as needed for Anxiety.  Take 1/2 tablet if too sedated with full dose., Disp: 15 tablet, Rfl: 0  •  caffei the labs with the patient. Most likely cause of increase IgA is in the setting of her inflammatory disorder particularly multiple sclerosis. IgA myeloma unlikely given normal SPEP and immunofixation.   If there is further rise in her total protein or gamm

## 2021-02-23 ENCOUNTER — OFFICE VISIT (OUTPATIENT)
Dept: HEMATOLOGY/ONCOLOGY | Facility: HOSPITAL | Age: 36
End: 2021-02-23
Attending: INTERNAL MEDICINE
Payer: COMMERCIAL

## 2021-02-23 VITALS
HEART RATE: 66 BPM | OXYGEN SATURATION: 96 % | HEIGHT: 65 IN | RESPIRATION RATE: 16 BRPM | WEIGHT: 189 LBS | DIASTOLIC BLOOD PRESSURE: 68 MMHG | BODY MASS INDEX: 31.49 KG/M2 | SYSTOLIC BLOOD PRESSURE: 112 MMHG | TEMPERATURE: 98 F

## 2021-02-23 DIAGNOSIS — R76.8 ELEVATED IMMUNOGLOBULIN A: Primary | ICD-10-CM

## 2021-02-23 DIAGNOSIS — G35 MULTIPLE SCLEROSIS (HCC): ICD-10-CM

## 2021-02-23 PROCEDURE — 99243 OFF/OP CNSLTJ NEW/EST LOW 30: CPT | Performed by: INTERNAL MEDICINE

## 2021-02-26 ENCOUNTER — OFFICE VISIT (OUTPATIENT)
Dept: PHYSICAL THERAPY | Facility: HOSPITAL | Age: 36
End: 2021-02-26
Attending: FAMILY MEDICINE
Payer: COMMERCIAL

## 2021-02-26 PROCEDURE — 97110 THERAPEUTIC EXERCISES: CPT

## 2021-02-26 PROCEDURE — 97140 MANUAL THERAPY 1/> REGIONS: CPT

## 2021-02-26 NOTE — PROGRESS NOTES
PHYSICAL THERAPY DAILY TREATMENT NOTE  Precautions: standard/universal  Fall risk: standard  Date of Evaluation: 1/4/21   Diagnosis: neck pain, lumbar spine pain     Insurance Type (# Auth): [facility] (8 visits)  Visit #: 4   Progress note due on: visit Britney San returned to PT today after taking a 4 week break to begin MS treatment. Numbness, tingling, and visual disturbances have cleared at this point In time and now just has mechanical neck pain due to upper cervical and CTJ hypomobility.  Hilda Coronado

## 2021-03-01 ENCOUNTER — OFFICE VISIT (OUTPATIENT)
Dept: PHYSICAL THERAPY | Facility: HOSPITAL | Age: 36
End: 2021-03-01
Attending: FAMILY MEDICINE
Payer: COMMERCIAL

## 2021-03-01 PROCEDURE — 97110 THERAPEUTIC EXERCISES: CPT

## 2021-03-01 PROCEDURE — 97140 MANUAL THERAPY 1/> REGIONS: CPT

## 2021-03-01 NOTE — PROGRESS NOTES
PHYSICAL THERAPY DAILY TREATMENT NOTE  Precautions: standard/universal  Fall risk: standard  Date of Evaluation: 1/4/21   Diagnosis: neck pain, lumbar spine pain     Insurance Type (# Auth): [address] [place] [address] (8 visits)  Visit #: 5   Progress note due on: visit will continue to benefit from skilled physical therapy interventions to improve function and achieve all established physical therapy goals.       Home Exercise Program  -Supine sub occipital release with tennis balls 2 minute hold adding in chin tucks for

## 2021-03-05 ENCOUNTER — OFFICE VISIT (OUTPATIENT)
Dept: PHYSICAL THERAPY | Facility: HOSPITAL | Age: 36
End: 2021-03-05
Attending: FAMILY MEDICINE
Payer: COMMERCIAL

## 2021-03-05 PROCEDURE — 97140 MANUAL THERAPY 1/> REGIONS: CPT

## 2021-03-05 NOTE — PROGRESS NOTES
PHYSICAL THERAPY DAILY TREATMENT NOTE  Precautions: standard/universal  Fall risk: standard  Date of Evaluation: 1/4/21   Diagnosis: neck pain, lumbar spine pain     Insurance Type (# Auth): [employer] [place] (8 visits)  Visit #: 6   Progress note due on: visit Collette Later will continue to benefit from skilled physical therapy interventions to improve function and achieve all established physical therapy goals.       Home Exercise Program  -Supine sub occipital release with tennis balls 2 minute hold adding in chin tu

## 2021-03-09 ENCOUNTER — OFFICE VISIT (OUTPATIENT)
Dept: PHYSICAL THERAPY | Facility: HOSPITAL | Age: 36
End: 2021-03-09
Attending: FAMILY MEDICINE
Payer: COMMERCIAL

## 2021-03-09 PROCEDURE — 97140 MANUAL THERAPY 1/> REGIONS: CPT

## 2021-03-09 NOTE — PROGRESS NOTES
PHYSICAL THERAPY DAILY TREATMENT NOTE  Precautions: standard/universal  Fall risk: standard  Date of Evaluation: 1/4/21   Diagnosis: neck pain, lumbar spine pain     Insurance Type (# Auth): [facility] (8 visits)  Visit #: 7   Progress note due on: visit Program  -Supine sub occipital release with tennis balls 2 minute hold adding in chin tucks for additional stretch with add in of R and L rotation with chin tuck to bias the right and left posterior capsule  -Upper cervical spine SNAG for rotation with pil

## 2021-03-15 ENCOUNTER — OFFICE VISIT (OUTPATIENT)
Dept: PHYSICAL THERAPY | Facility: HOSPITAL | Age: 36
End: 2021-03-15
Attending: FAMILY MEDICINE
Payer: COMMERCIAL

## 2021-03-15 PROCEDURE — 97140 MANUAL THERAPY 1/> REGIONS: CPT

## 2021-03-15 NOTE — PROGRESS NOTES
PHYSICAL THERAPY PROGRESS REPORT  Precautions: standard/universal  Fall risk: standard  Date of Evaluation: 1/4/21   Diagnosis: neck pain, lumbar spine pain     Insurance Type (# Auth): [facility] (8 visits)  Visit #: 8   Progress note due on: visit #8 continue to benefit from skilled physical therapy interventions to improve function and achieve all established physical therapy goals with an anticipated 8 more visits required.        Home Exercise Program  -Supine sub occipital release with tennis balls

## 2021-03-16 ENCOUNTER — TELEPHONE (OUTPATIENT)
Dept: PHYSICAL THERAPY | Facility: HOSPITAL | Age: 36
End: 2021-03-16

## 2021-03-16 ENCOUNTER — TELEPHONE (OUTPATIENT)
Dept: NEUROLOGY | Facility: CLINIC | Age: 36
End: 2021-03-16

## 2021-03-16 DIAGNOSIS — R20.2 PARESTHESIA: ICD-10-CM

## 2021-03-16 DIAGNOSIS — M54.2 NECK PAIN: Primary | ICD-10-CM

## 2021-03-17 ENCOUNTER — APPOINTMENT (OUTPATIENT)
Dept: PHYSICAL THERAPY | Facility: HOSPITAL | Age: 36
End: 2021-03-17
Attending: FAMILY MEDICINE
Payer: COMMERCIAL

## 2021-03-22 ENCOUNTER — OFFICE VISIT (OUTPATIENT)
Dept: PHYSICAL THERAPY | Facility: HOSPITAL | Age: 36
End: 2021-03-22
Attending: FAMILY MEDICINE
Payer: COMMERCIAL

## 2021-03-22 PROCEDURE — 97140 MANUAL THERAPY 1/> REGIONS: CPT

## 2021-03-22 NOTE — PROGRESS NOTES
PHYSICAL THERAPY PROGRESS REPORT  Precautions: standard/universal  Fall risk: standard  Date of Evaluation: 1/4/21   Diagnosis: neck pain, lumbar spine pain     Insurance Type (# Auth): [facility] (8 visits)  Visit #: 9   Progress note due on: visit #16 to benefit from skilled physical therapy interventions to improve function and achieve all established physical therapy goals with an anticipated 8 more visits required.        Home Exercise Program  -Supine sub occipital release with tennis balls 2 minute

## 2021-03-24 ENCOUNTER — APPOINTMENT (OUTPATIENT)
Dept: PHYSICAL THERAPY | Facility: HOSPITAL | Age: 36
End: 2021-03-24
Attending: FAMILY MEDICINE
Payer: COMMERCIAL

## 2021-03-29 ENCOUNTER — TELEPHONE (OUTPATIENT)
Dept: FAMILY MEDICINE CLINIC | Facility: CLINIC | Age: 36
End: 2021-03-29

## 2021-03-29 NOTE — TELEPHONE ENCOUNTER
RN contacted pt, scheduled her for video visit with Dr Tori Barlow tomorrow to discuss medication and request refill.

## 2021-03-29 NOTE — TELEPHONE ENCOUNTER
PT would like the following added to her medlist and get a refill  This was originally prescribed by Ambrosio Congress in February.  The patient is almost out-    DULoxetine HCl 20 MG Oral Cap  [address] [address]

## 2021-03-30 ENCOUNTER — TELEMEDICINE (OUTPATIENT)
Dept: FAMILY MEDICINE CLINIC | Facility: CLINIC | Age: 36
End: 2021-03-30

## 2021-03-30 ENCOUNTER — OFFICE VISIT (OUTPATIENT)
Dept: PHYSICAL THERAPY | Facility: HOSPITAL | Age: 36
End: 2021-03-30
Attending: FAMILY MEDICINE
Payer: COMMERCIAL

## 2021-03-30 DIAGNOSIS — G35 MULTIPLE SCLEROSIS (HCC): Primary | ICD-10-CM

## 2021-03-30 DIAGNOSIS — F32.1 CURRENT MODERATE EPISODE OF MAJOR DEPRESSIVE DISORDER WITHOUT PRIOR EPISODE (HCC): ICD-10-CM

## 2021-03-30 PROCEDURE — 97140 MANUAL THERAPY 1/> REGIONS: CPT

## 2021-03-30 PROCEDURE — 99213 OFFICE O/P EST LOW 20 MIN: CPT | Performed by: FAMILY MEDICINE

## 2021-03-30 RX ORDER — LORAZEPAM 0.5 MG/1
0.5 TABLET ORAL 2 TIMES DAILY PRN
Qty: 15 TABLET | Refills: 0 | COMMUNITY
Start: 2021-03-30 | End: 2021-03-30

## 2021-03-30 RX ORDER — FLUTICASONE PROPIONATE 50 MCG
2 SPRAY, SUSPENSION (ML) NASAL DAILY
Qty: 1 INHALER | Refills: 5 | COMMUNITY
Start: 2021-03-30

## 2021-03-30 RX ORDER — CYCLOBENZAPRINE HCL 10 MG
10 TABLET ORAL 3 TIMES DAILY PRN
Qty: 90 TABLET | Refills: 3 | COMMUNITY
Start: 2021-03-30 | End: 2021-04-15

## 2021-03-30 RX ORDER — DULOXETIN HYDROCHLORIDE 20 MG/1
20 CAPSULE, DELAYED RELEASE ORAL DAILY
Qty: 90 CAPSULE | Refills: 0 | Status: SHIPPED | OUTPATIENT
Start: 2021-03-30 | End: 2021-05-03

## 2021-03-30 RX ORDER — LORAZEPAM 0.5 MG/1
0.5 TABLET ORAL 2 TIMES DAILY PRN
Qty: 20 TABLET | Refills: 0 | Status: SHIPPED | OUTPATIENT
Start: 2021-03-30 | End: 2021-05-03

## 2021-03-30 NOTE — PROGRESS NOTES
PHYSICAL THERAPY PROGRESS REPORT  Precautions: standard/universal  Fall risk: standard  Date of Evaluation: 1/4/21   Diagnosis: neck pain, lumbar spine pain     Insurance Type (# Auth): [employer] [facility] (8 visits)  Visit #: 10   Progress note due on: visit #16 transition to independent management and no longer requires skilled physical therapy interventions.       Home Exercise Program  -Supine sub occipital release with tennis balls 2 minute hold adding in chin tucks for additional stretch with add in of R and L

## 2021-03-30 NOTE — PROGRESS NOTES
CC:  Patient presents with: Other: video visit for f/u of medication      HPI: 38year old female presenting for video visit to follow-up on MS and neck pain. Has been doing physical therapy regularly for her neck pain but still having pain.    Gets a lot Vaping Use      Vaping Use: Never used    Substance and Sexual Activity      Alcohol use: Not Currently        Alcohol/week: 14.0 standard drinks        Types: 14 Standard drinks or equivalent per week      Drug use: Yes        Frequency: 7.0 times per wee ALLERGY RELIEF) 50 MCG/ACT Nasal Suspension 2 sprays by Each Nare route daily.  (Patient not taking: Reported on 2/23/2021 ) 1 Inhaler 5       Seasonal, Grass, and Ragweed        Physical:  General:  Alert, appropriate, no acute distress, A&O x 3  Pulmonary

## 2021-03-31 ENCOUNTER — TELEPHONE (OUTPATIENT)
Dept: OBGYN CLINIC | Facility: CLINIC | Age: 36
End: 2021-03-31

## 2021-03-31 NOTE — TELEPHONE ENCOUNTER
DULoxetine HCl 20 MG Oral Cap DR Particles 90 capsule 0 3/30/2021     Sig - Route:  Take 1 capsule (20 mg total) by mouth daily. - Oral    Sent to pharmacy as: DULoxetine HCl 20 MG Oral Capsule Delayed Release Particles (CYMBALTA)    E-Prescribing Status: R

## 2021-03-31 NOTE — TELEPHONE ENCOUNTER
PT was unable to get the following Rx  [facility] states instructions are not clear:    DULoxetine HCl 20 MG Oral Cap DR Particles 90 capsule 0 3/30/2021    Sig:   Take 1 capsule (20 mg total) by mouth daily.        [id number] - [place],

## 2021-04-09 ENCOUNTER — APPOINTMENT (OUTPATIENT)
Dept: PHYSICAL THERAPY | Facility: HOSPITAL | Age: 36
End: 2021-04-09
Attending: FAMILY MEDICINE
Payer: COMMERCIAL

## 2021-05-03 RX ORDER — DULOXETIN HYDROCHLORIDE 20 MG/1
20 CAPSULE, DELAYED RELEASE ORAL DAILY
Qty: 90 CAPSULE | Refills: 0 | Status: SHIPPED | OUTPATIENT
Start: 2021-05-03 | End: 2021-05-04

## 2021-05-03 RX ORDER — LORAZEPAM 0.5 MG/1
0.5 TABLET ORAL 2 TIMES DAILY PRN
Qty: 20 TABLET | Refills: 0 | Status: SHIPPED | OUTPATIENT
Start: 2021-05-03 | End: 2021-07-08

## 2021-05-03 NOTE — TELEPHONE ENCOUNTER
A refill request was received for:  Requested Prescriptions     Pending Prescriptions Disp Refills   • DULoxetine HCl 20 MG Oral Cap DR Particles 90 capsule 0     Sig: Take 1 capsule (20 mg total) by mouth daily.    • LORazepam 0.5 MG Oral Tab 20 tablet 0

## 2021-05-04 ENCOUNTER — TELEPHONE (OUTPATIENT)
Dept: FAMILY MEDICINE CLINIC | Facility: CLINIC | Age: 36
End: 2021-05-04

## 2021-05-04 ENCOUNTER — OFFICE VISIT (OUTPATIENT)
Dept: PULMONOLOGY | Facility: CLINIC | Age: 36
End: 2021-05-04

## 2021-05-04 VITALS
HEART RATE: 73 BPM | WEIGHT: 186.63 LBS | SYSTOLIC BLOOD PRESSURE: 133 MMHG | BODY MASS INDEX: 31.09 KG/M2 | OXYGEN SATURATION: 98 % | DIASTOLIC BLOOD PRESSURE: 81 MMHG | HEIGHT: 65 IN | RESPIRATION RATE: 18 BRPM | TEMPERATURE: 98 F

## 2021-05-04 DIAGNOSIS — J98.4 MULTIPLE IDIOPATHIC PULMONARY CYSTS: ICD-10-CM

## 2021-05-04 DIAGNOSIS — R91.1 PULMONARY NODULE: Primary | ICD-10-CM

## 2021-05-04 PROCEDURE — 99243 OFF/OP CNSLTJ NEW/EST LOW 30: CPT | Performed by: INTERNAL MEDICINE

## 2021-05-04 PROCEDURE — 3008F BODY MASS INDEX DOCD: CPT | Performed by: INTERNAL MEDICINE

## 2021-05-04 PROCEDURE — 3079F DIAST BP 80-89 MM HG: CPT | Performed by: INTERNAL MEDICINE

## 2021-05-04 PROCEDURE — 3075F SYST BP GE 130 - 139MM HG: CPT | Performed by: INTERNAL MEDICINE

## 2021-05-04 RX ORDER — DULOXETIN HYDROCHLORIDE 20 MG/1
20 CAPSULE, DELAYED RELEASE ORAL 2 TIMES DAILY
Qty: 180 CAPSULE | Refills: 0 | Status: SHIPPED | OUTPATIENT
Start: 2021-05-04 | End: 2021-07-28

## 2021-05-04 NOTE — TELEPHONE ENCOUNTER
This RN called pt back and informed her that Rx was sent in. Pt to contact [facility] to have med filled. Pt verbalized understanding and agrees with POC.

## 2021-05-04 NOTE — TELEPHONE ENCOUNTER
This RN called pt back and pt states per neurologist, pt to take Duloxetine 20 mg 1 tab BID, hence pt ran out of previous Rx. Pt states per pharmD, pt would have to pay over $500 for 90 day supply.  This RN explained to pt pharmacy possibly quoted pt this n

## 2021-05-04 NOTE — TELEPHONE ENCOUNTER
Pt is calling to have a Rx change (Duloxetine). Pt was told to take a dose of 2 but her insurance will not cover it. So, she wants to know if the dosage can be increased to 30 days and she will just pay the remaining that is not covered by insurance.

## 2021-05-04 NOTE — PROGRESS NOTES
Dear Eladio Wright:           As you know, Geno Dillon is a 26-year-old female primarily evaluating for abnormal CT scan of the chest.       HISTORY OF PRESENT ILLNESS: The patient has a history of newly diagnosed multiple sclerosis.   She had developed an allergic re rhythm no murmur. Abdomen nontender, without hepatosplenomegaly and no mass appreciable. Extremities and Musculoskeletal without clubbing cyanosis nor edema, and mobility acceptable. Neurologic grossly intact with symmetric tone and strength and reflex.

## 2021-05-04 NOTE — PROGRESS NOTES
Completed tobacco treatment enrollment form faxed to [facility] @ #[phone]. Confirmation rcvd. Form sent to scanning.

## 2021-05-17 ENCOUNTER — HOSPITAL ENCOUNTER (OUTPATIENT)
Dept: CT IMAGING | Facility: HOSPITAL | Age: 36
Discharge: HOME | End: 2021-05-17
Attending: INTERNAL MEDICINE
Payer: COMMERCIAL

## 2021-05-17 DIAGNOSIS — J98.4 MULTIPLE IDIOPATHIC PULMONARY CYSTS: ICD-10-CM

## 2021-05-17 PROCEDURE — 71250 CT THORAX DX C-: CPT | Performed by: INTERNAL MEDICINE

## 2021-06-25 ENCOUNTER — HOSPITAL ENCOUNTER (EMERGENCY)
Facility: HOSPITAL | Age: 36
Discharge: HOME | End: 2021-06-25
Attending: EMERGENCY MEDICINE
Payer: COMMERCIAL

## 2021-06-25 VITALS
WEIGHT: 185 LBS | HEIGHT: 65 IN | OXYGEN SATURATION: 97 % | SYSTOLIC BLOOD PRESSURE: 123 MMHG | HEART RATE: 70 BPM | DIASTOLIC BLOOD PRESSURE: 77 MMHG | BODY MASS INDEX: 30.82 KG/M2 | TEMPERATURE: 98 F | RESPIRATION RATE: 18 BRPM

## 2021-06-25 DIAGNOSIS — R11.2 NAUSEA AND VOMITING IN ADULT: Primary | ICD-10-CM

## 2021-06-25 PROCEDURE — 81003 URINALYSIS AUTO W/O SCOPE: CPT | Performed by: EMERGENCY MEDICINE

## 2021-06-25 PROCEDURE — 80048 BASIC METABOLIC PNL TOTAL CA: CPT | Performed by: EMERGENCY MEDICINE

## 2021-06-25 PROCEDURE — 96360 HYDRATION IV INFUSION INIT: CPT

## 2021-06-25 PROCEDURE — 99284 EMERGENCY DEPT VISIT MOD MDM: CPT

## 2021-06-25 PROCEDURE — 85025 COMPLETE CBC W/AUTO DIFF WBC: CPT | Performed by: EMERGENCY MEDICINE

## 2021-06-25 NOTE — ED PROVIDER NOTES
Patient Seen in: [facility] AND [facility] Emergency Department      History   Patient presents with:  Nausea/vomiting  Low Back Pain    Stated Complaint: urinary sx    HPI/Subjective:   HPI    Patient is a 39-year-old female who presents with right-sided interm SpO2 99%   BMI 30.79 kg/m²         Physical Exam    GENERAL: No acute distress, awake and alert  HEENT: MMM, EOMI, PERRL  Neck: supple, non tender  CV: RRR, no murmurs  Resp: CTAB, no wheezes or retractions  Ab: soft, nontender, no distension, no cvat  Ext Disposition:  Discharge  6/25/2021  8:20 am    Follow-up:  Merritt Crow [address]  [phone]    In 2 days            Medications Prescribed:  Current Discharge Medication List

## 2021-06-25 NOTE — ED INITIAL ASSESSMENT (HPI)
Pt comes to ER with c/o lower back pain for a couple weeks then vomiting last night. Pt is on a medication that makes the person prone to urinary infections.

## 2021-07-08 RX ORDER — LORAZEPAM 0.5 MG/1
0.5 TABLET ORAL 2 TIMES DAILY PRN
Qty: 20 TABLET | Refills: 0 | Status: SHIPPED | OUTPATIENT
Start: 2021-07-08 | End: 2021-11-16

## 2021-07-14 ENCOUNTER — TELEPHONE (OUTPATIENT)
Dept: FAMILY MEDICINE CLINIC | Facility: CLINIC | Age: 36
End: 2021-07-14

## 2021-07-14 NOTE — TELEPHONE ENCOUNTER
Pt was written a referral for a neurologist to [facility] but she would prefer to go to [place] due to her insurance.

## 2021-07-14 NOTE — TELEPHONE ENCOUNTER
LVM to call insurance to verify auth d/t this system stating pt has auth to see [facility] provider for Neuro.

## 2021-07-23 ENCOUNTER — TELEMEDICINE (OUTPATIENT)
Dept: FAMILY MEDICINE CLINIC | Facility: CLINIC | Age: 36
End: 2021-07-23

## 2021-07-23 DIAGNOSIS — C96.6 LANGERHANS CELL HISTIOCYTOSIS (HCC): Primary | ICD-10-CM

## 2021-07-23 DIAGNOSIS — G35 MULTIPLE SCLEROSIS (HCC): ICD-10-CM

## 2021-07-23 PROCEDURE — 99213 OFFICE O/P EST LOW 20 MIN: CPT | Performed by: FAMILY MEDICINE

## 2021-07-23 NOTE — PROGRESS NOTES
CC:  Patient presents with:  Hospital F/U      HPI: 39year old female presenting for video visit to discuss new diagnosis of Langerhans cell histiocytosis.    Just completed her Covid vaccine series but is worried that they are not as effective for people Frequency: 7.0 times per week        Types: Cannabis        Comment: for sleep      Sexual activity: Yes        Partners: Male    Other Topics      Concerns:        Caffeine Concern: Not Asked        Exercise: Not Asked        Seat Belt: Not Asked Suspension 2 sprays by Each Nare route daily.  1 Inhaler 5       Seasonal, Grass, and Ragweed      Physical:  General:  Alert, appropriate, no acute distress, A&O x 3  Pulmonary:  Speaking in clear and full sentences, no dyspnea   Psych: normal mood and aff

## 2021-07-26 ENCOUNTER — TELEPHONE (OUTPATIENT)
Dept: OBGYN CLINIC | Facility: CLINIC | Age: 36
End: 2021-07-26

## 2021-07-26 DIAGNOSIS — C96.6 LANGERHAN'S CELL HISTIOCYTOSIS (HCC): Primary | ICD-10-CM

## 2021-07-26 NOTE — TELEPHONE ENCOUNTER
----- Message from Facundo Romero DO sent at 7/26/2021  3:15 PM CDT -----  Regarding: FW: Langerhans cell histiocytosis  Please notify patient she should schedule a visit with Dr. Marisabel Robles (hematologist at [facility]) to discuss Langerhans Cell Histiocytosis.  Plea

## 2021-07-28 ENCOUNTER — OFFICE VISIT (OUTPATIENT)
Dept: HEMATOLOGY/ONCOLOGY | Facility: HOSPITAL | Age: 36
End: 2021-07-28
Attending: INTERNAL MEDICINE
Payer: COMMERCIAL

## 2021-07-28 VITALS
TEMPERATURE: 98 F | HEIGHT: 65 IN | SYSTOLIC BLOOD PRESSURE: 114 MMHG | DIASTOLIC BLOOD PRESSURE: 70 MMHG | BODY MASS INDEX: 31.49 KG/M2 | RESPIRATION RATE: 16 BRPM | WEIGHT: 189 LBS | HEART RATE: 66 BPM

## 2021-07-28 DIAGNOSIS — G35 MULTIPLE SCLEROSIS (HCC): ICD-10-CM

## 2021-07-28 DIAGNOSIS — C96.6 LANGERHANS CELL HISTIOCYTOSIS (HCC): Primary | ICD-10-CM

## 2021-07-28 DIAGNOSIS — R91.1 PULMONARY NODULE: ICD-10-CM

## 2021-07-28 PROCEDURE — 99215 OFFICE O/P EST HI 40 MIN: CPT | Performed by: INTERNAL MEDICINE

## 2021-07-28 RX ORDER — DULOXETIN HYDROCHLORIDE 20 MG/1
20 CAPSULE, DELAYED RELEASE ORAL 2 TIMES DAILY
Qty: 180 CAPSULE | Refills: 1 | Status: SHIPPED | OUTPATIENT
Start: 2021-07-28

## 2021-07-28 NOTE — TELEPHONE ENCOUNTER
A refill request was received for:  Requested Prescriptions     Pending Prescriptions Disp Refills   • DULOXETINE 20 MG Oral Cap DR Particles [Pharmacy Med Name: DULOXETINE DR 20MG CAPSULES] 180 capsule 0     Sig: TAKE 1 CAPSULE(20 MG) BY MOUTH TWICE DAILY

## 2021-07-28 NOTE — PROGRESS NOTES
Cancer Center Progress Note    Patient Name: Fred Cornejo   YOB: 1985   Medical Record Number: [id number]   Attending Physician: Joseline Mota M.D.      Chief Complaint:  Pulmonary lesions, CNS lesions    Oncology History:  39year old r LFTs and renal function.     Past Medical History:  Past Medical History:   Diagnosis Date   • Multiple sclerosis ([facility] [address]    • PCOS (polycystic ovarian syndrome)        Past Surgical History:  Past Surgical History:   Procedure Laterality Date   •  alert and oriented x 3, not in acute distress. HEENT: EOMs intact. Oropharynx is clear. Neck: No JVD, ROM intact. Lymphatics:  There is no palpable peripheral lymphadenopathy   Chest: Symmetric expansion, nonlabored breathing  Cardiovascular: Regular w would recommend smoking sensation and repeat imaging and follow-up with Dr. Tim Leonardo however given her bone aches and pains as well as lesions in the brain I do recommend complete evaluation to rule out multisystem disease.   Encouraging that her CBC CMP does n

## 2021-07-29 ENCOUNTER — TELEPHONE (OUTPATIENT)
Dept: HEMATOLOGY/ONCOLOGY | Facility: HOSPITAL | Age: 36
End: 2021-07-29

## 2021-07-29 NOTE — TELEPHONE ENCOUNTER
I spoke with Maged Giron and let her know that Dr Miladis Raygoza would like for her to get a PET/CT done. If it is negative, she does not need to go to [facility]. I put the order in and gave her the number to call and schedule. We will follow up with her after the scan.   Arun Arciniega

## 2021-08-03 ENCOUNTER — HOSPITAL ENCOUNTER (OUTPATIENT)
Dept: MRI IMAGING | Facility: HOSPITAL | Age: 36
Discharge: HOME | End: 2021-08-03
Attending: Other
Payer: COMMERCIAL

## 2021-08-03 DIAGNOSIS — G35 MS (MULTIPLE SCLEROSIS) (HCC): ICD-10-CM

## 2021-08-03 PROCEDURE — A9575 INJ GADOTERATE MEGLUMI 0.1ML: HCPCS | Performed by: OTHER

## 2021-08-03 PROCEDURE — 70553 MRI BRAIN STEM W/O & W/DYE: CPT | Performed by: OTHER

## 2021-08-04 ENCOUNTER — HOSPITAL ENCOUNTER (OUTPATIENT)
Dept: NUCLEAR MEDICINE | Facility: HOSPITAL | Age: 36
Discharge: HOME | End: 2021-08-04
Attending: INTERNAL MEDICINE
Payer: COMMERCIAL

## 2021-08-04 DIAGNOSIS — C96.6 LANGERHANS CELL HISTIOCYTOSIS (HCC): ICD-10-CM

## 2021-08-04 DIAGNOSIS — R91.1 PULMONARY NODULE: ICD-10-CM

## 2021-08-04 LAB — GLUCOSE BLDC GLUCOMTR-MCNC: 110 MG/DL (ref 70–99)

## 2021-08-04 PROCEDURE — 78815 PET IMAGE W/CT SKULL-THIGH: CPT | Performed by: INTERNAL MEDICINE

## 2021-08-04 PROCEDURE — 82962 GLUCOSE BLOOD TEST: CPT

## 2021-08-05 ENCOUNTER — HOSPITAL ENCOUNTER (OUTPATIENT)
Dept: MRI IMAGING | Facility: HOSPITAL | Age: 36
Discharge: HOME | End: 2021-08-05
Attending: Other
Payer: COMMERCIAL

## 2021-08-05 DIAGNOSIS — G35 MS (MULTIPLE SCLEROSIS) (HCC): ICD-10-CM

## 2021-08-05 PROCEDURE — 72156 MRI NECK SPINE W/O & W/DYE: CPT | Performed by: OTHER

## 2021-08-05 PROCEDURE — 72157 MRI CHEST SPINE W/O & W/DYE: CPT | Performed by: OTHER

## 2021-08-05 PROCEDURE — A9575 INJ GADOTERATE MEGLUMI 0.1ML: HCPCS | Performed by: OTHER

## 2021-08-09 ENCOUNTER — OFFICE VISIT (OUTPATIENT)
Dept: HEMATOLOGY/ONCOLOGY | Facility: HOSPITAL | Age: 36
End: 2021-08-09
Attending: Other
Payer: COMMERCIAL

## 2021-08-09 ENCOUNTER — OFFICE VISIT (OUTPATIENT)
Dept: HEMATOLOGY/ONCOLOGY | Facility: HOSPITAL | Age: 36
End: 2021-08-09
Attending: INTERNAL MEDICINE
Payer: COMMERCIAL

## 2021-08-09 VITALS
RESPIRATION RATE: 16 BRPM | TEMPERATURE: 98 F | OXYGEN SATURATION: 96 % | DIASTOLIC BLOOD PRESSURE: 56 MMHG | HEART RATE: 64 BPM | SYSTOLIC BLOOD PRESSURE: 118 MMHG

## 2021-08-09 VITALS
TEMPERATURE: 98 F | HEART RATE: 59 BPM | SYSTOLIC BLOOD PRESSURE: 102 MMHG | RESPIRATION RATE: 16 BRPM | DIASTOLIC BLOOD PRESSURE: 52 MMHG | OXYGEN SATURATION: 98 %

## 2021-08-09 DIAGNOSIS — G35 MULTIPLE SCLEROSIS (HCC): Primary | ICD-10-CM

## 2021-08-09 DIAGNOSIS — G35 MULTIPLE SCLEROSIS (HCC): ICD-10-CM

## 2021-08-09 DIAGNOSIS — C96.6 LANGERHANS CELL HISTIOCYTOSIS (HCC): Primary | ICD-10-CM

## 2021-08-09 PROBLEM — R91.1 PULMONARY NODULE: Status: RESOLVED | Noted: 2021-02-09 | Resolved: 2021-08-09

## 2021-08-09 PROBLEM — G97.1 SPINAL HEADACHE: Status: RESOLVED | Noted: 2021-02-05 | Resolved: 2021-08-09

## 2021-08-09 PROCEDURE — 99213 OFFICE O/P EST LOW 20 MIN: CPT | Performed by: INTERNAL MEDICINE

## 2021-08-09 PROCEDURE — 96366 THER/PROPH/DIAG IV INF ADDON: CPT

## 2021-08-09 PROCEDURE — 96376 TX/PRO/DX INJ SAME DRUG ADON: CPT

## 2021-08-09 PROCEDURE — 96375 TX/PRO/DX INJ NEW DRUG ADDON: CPT

## 2021-08-09 PROCEDURE — 96365 THER/PROPH/DIAG IV INF INIT: CPT

## 2021-08-09 RX ORDER — DIPHENHYDRAMINE HYDROCHLORIDE 50 MG/ML
25 INJECTION INTRAMUSCULAR; INTRAVENOUS ONCE
Status: DISCONTINUED | OUTPATIENT
Start: 2021-08-09 | End: 2021-08-09

## 2021-08-09 RX ORDER — DIPHENHYDRAMINE HYDROCHLORIDE 50 MG/ML
INJECTION INTRAMUSCULAR; INTRAVENOUS
Status: COMPLETED
Start: 2021-08-09 | End: 2021-08-09

## 2021-08-09 RX ORDER — METHYLPREDNISOLONE SODIUM SUCCINATE 125 MG/2ML
100 INJECTION, POWDER, LYOPHILIZED, FOR SOLUTION INTRAMUSCULAR; INTRAVENOUS ONCE
Status: COMPLETED | OUTPATIENT
Start: 2021-08-09 | End: 2021-08-09

## 2021-08-09 RX ORDER — DIPHENHYDRAMINE HCL 25 MG
25 CAPSULE ORAL ONCE
OUTPATIENT
Start: 2021-08-09

## 2021-08-09 RX ORDER — METHYLPREDNISOLONE SODIUM SUCCINATE 125 MG/2ML
INJECTION, POWDER, LYOPHILIZED, FOR SOLUTION INTRAMUSCULAR; INTRAVENOUS
Status: COMPLETED
Start: 2021-08-09 | End: 2021-08-09

## 2021-08-09 RX ORDER — ACETAMINOPHEN 325 MG/1
TABLET ORAL
Status: COMPLETED
Start: 2021-08-09 | End: 2021-08-09

## 2021-08-09 RX ORDER — ACETAMINOPHEN 325 MG/1
650 TABLET ORAL ONCE
Status: CANCELLED
Start: 2021-08-09 | End: 2021-08-09

## 2021-08-09 RX ORDER — ACETAMINOPHEN 325 MG/1
650 TABLET ORAL ONCE
Status: CANCELLED | OUTPATIENT
Start: 2021-08-09

## 2021-08-09 RX ORDER — DIPHENHYDRAMINE HYDROCHLORIDE 50 MG/ML
25 INJECTION INTRAMUSCULAR; INTRAVENOUS ONCE
Status: CANCELLED | OUTPATIENT
Start: 2021-08-09

## 2021-08-09 RX ORDER — METHYLPREDNISOLONE SODIUM SUCCINATE 125 MG/2ML
100 INJECTION, POWDER, LYOPHILIZED, FOR SOLUTION INTRAMUSCULAR; INTRAVENOUS ONCE
Status: CANCELLED | OUTPATIENT
Start: 2021-08-09

## 2021-08-09 RX ORDER — ACETAMINOPHEN 325 MG/1
650 TABLET ORAL ONCE
Status: COMPLETED | OUTPATIENT
Start: 2021-08-09 | End: 2021-08-09

## 2021-08-09 RX ORDER — ACETAMINOPHEN 325 MG/1
650 TABLET ORAL ONCE
Status: DISCONTINUED | OUTPATIENT
Start: 2021-08-09 | End: 2021-08-09

## 2021-08-09 RX ORDER — DIPHENHYDRAMINE HYDROCHLORIDE 50 MG/ML
25 INJECTION INTRAMUSCULAR; INTRAVENOUS ONCE
Status: COMPLETED | OUTPATIENT
Start: 2021-08-09 | End: 2021-08-09

## 2021-08-09 RX ORDER — DIPHENHYDRAMINE HYDROCHLORIDE 50 MG/ML
25 INJECTION INTRAMUSCULAR; INTRAVENOUS ONCE
Status: CANCELLED
Start: 2021-08-09 | End: 2021-08-09

## 2021-08-09 RX ADMIN — ACETAMINOPHEN 650 MG: 325 TABLET ORAL at 11:09:00

## 2021-08-09 RX ADMIN — METHYLPREDNISOLONE SODIUM SUCCINATE 100 MG: 125 INJECTION, POWDER, LYOPHILIZED, FOR SOLUTION INTRAMUSCULAR; INTRAVENOUS at 08:29:00

## 2021-08-09 RX ADMIN — ACETAMINOPHEN 650 MG: 325 TABLET ORAL at 08:23:00

## 2021-08-09 RX ADMIN — DIPHENHYDRAMINE HYDROCHLORIDE 25 MG: 50 INJECTION INTRAMUSCULAR; INTRAVENOUS at 08:30:00

## 2021-08-09 RX ADMIN — DIPHENHYDRAMINE HYDROCHLORIDE 25 MG: 50 INJECTION INTRAMUSCULAR; INTRAVENOUS at 11:10:00

## 2021-08-09 NOTE — PROGRESS NOTES
Cancer Center Progress Note    Patient Name: Jas Flowers   YOB: 1985   Medical Record Number: [id number]   Attending Physician: [facility], M.D.      Chief Complaint:  Pulmonary lesions, CNS lesions with MS    Oncology History:  40 ye Disorders Associated Father    • Substance Abuse Father    • Skin cancer Mother    • Colon Cancer Maternal Grandmother         48   • Colon Cancer Maternal Grandfather         48   • Bipolar Disorder Brother    • Schizophrenia Brother        Social History PET/CT findings that do not show any evidence of systemic Langerhans' cell histiocytosis. - MRI brain findings more likely to be MS given the LP with oligoclonal bands  - Likely has Langarhan's with lung involvement only and appears to be low risk, recommen

## 2021-08-10 ENCOUNTER — TELEPHONE (OUTPATIENT)
Dept: HEMATOLOGY/ONCOLOGY | Facility: HOSPITAL | Age: 36
End: 2021-08-10

## 2021-08-23 ENCOUNTER — APPOINTMENT (OUTPATIENT)
Dept: HEMATOLOGY/ONCOLOGY | Facility: HOSPITAL | Age: 36
End: 2021-08-23
Attending: INTERNAL MEDICINE
Payer: COMMERCIAL

## 2021-08-26 ENCOUNTER — TELEMEDICINE (OUTPATIENT)
Dept: FAMILY MEDICINE CLINIC | Facility: CLINIC | Age: 36
End: 2021-08-26

## 2021-08-26 ENCOUNTER — HOSPITAL ENCOUNTER (OUTPATIENT)
Dept: GENERAL RADIOLOGY | Age: 36
Discharge: HOME | End: 2021-08-26
Attending: INTERNAL MEDICINE
Payer: COMMERCIAL

## 2021-08-26 ENCOUNTER — LAB ENCOUNTER (OUTPATIENT)
Dept: LAB | Age: 36
End: 2021-08-26
Attending: INTERNAL MEDICINE
Payer: COMMERCIAL

## 2021-08-26 DIAGNOSIS — R19.7 DIARRHEA, UNSPECIFIED TYPE: ICD-10-CM

## 2021-08-26 DIAGNOSIS — G35 MS (MULTIPLE SCLEROSIS) (HCC): ICD-10-CM

## 2021-08-26 DIAGNOSIS — C96.6 LANGERHANS CELL HISTIOCYTOSIS (HCC): ICD-10-CM

## 2021-08-26 DIAGNOSIS — E55.9 VITAMIN D INSUFFICIENCY: ICD-10-CM

## 2021-08-26 DIAGNOSIS — E83.51 HYPOCALCEMIA: ICD-10-CM

## 2021-08-26 DIAGNOSIS — K40.90 LEFT INGUINAL HERNIA: Primary | ICD-10-CM

## 2021-08-26 DIAGNOSIS — J34.89 SINUS PRESSURE: ICD-10-CM

## 2021-08-26 LAB
ALBUMIN SERPL-MCNC: 3.4 G/DL (ref 3.4–5)
ALBUMIN/GLOB SERPL: 1.1 {RATIO} (ref 1–2)
ALP LIVER SERPL-CCNC: 53 U/L
ALT SERPL-CCNC: 59 U/L
ANION GAP SERPL CALC-SCNC: 5 MMOL/L (ref 0–18)
AST SERPL-CCNC: 28 U/L (ref 15–37)
BASOPHILS # BLD AUTO: 0.06 X10(3) UL (ref 0–0.2)
BASOPHILS NFR BLD AUTO: 0.8 %
BILIRUB SERPL-MCNC: 0.2 MG/DL (ref 0.1–2)
BUN BLD-MCNC: 5 MG/DL (ref 7–18)
BUN/CREAT SERPL: 8.8 (ref 10–20)
CALCIUM BLD-MCNC: 7.6 MG/DL (ref 8.5–10.1)
CHLORIDE SERPL-SCNC: 109 MMOL/L (ref 98–112)
CO2 SERPL-SCNC: 25 MMOL/L (ref 21–32)
CREAT BLD-MCNC: 0.57 MG/DL
DEPRECATED HBV CORE AB SER IA-ACNC: 43 NG/ML
DEPRECATED RDW RBC AUTO: 41.7 FL (ref 35.1–46.3)
EOSINOPHIL # BLD AUTO: 0.21 X10(3) UL (ref 0–0.7)
EOSINOPHIL NFR BLD AUTO: 2.8 %
ERYTHROCYTE [DISTWIDTH] IN BLOOD BY AUTOMATED COUNT: 11.7 % (ref 11–15)
ERYTHROCYTE [SEDIMENTATION RATE] IN BLOOD: 3 MM/HR
GLOBULIN PLAS-MCNC: 3.1 G/DL (ref 2.8–4.4)
GLUCOSE BLD-MCNC: 121 MG/DL (ref 70–99)
HCT VFR BLD AUTO: 42.3 %
HGB BLD-MCNC: 14.8 G/DL
IMM GRANULOCYTES # BLD AUTO: 0.03 X10(3) UL (ref 0–1)
IMM GRANULOCYTES NFR BLD: 0.4 %
LDH SERPL L TO P-CCNC: 141 U/L
LYMPHOCYTES # BLD AUTO: 2.32 X10(3) UL (ref 1–4)
LYMPHOCYTES NFR BLD AUTO: 30.9 %
M PROTEIN MFR SERPL ELPH: 6.5 G/DL (ref 6.4–8.2)
MCH RBC QN AUTO: 33.9 PG (ref 26–34)
MCHC RBC AUTO-ENTMCNC: 35 G/DL (ref 31–37)
MCV RBC AUTO: 96.8 FL
MONOCYTES # BLD AUTO: 0.71 X10(3) UL (ref 0.1–1)
MONOCYTES NFR BLD AUTO: 9.4 %
NEUTROPHILS # BLD AUTO: 4.19 X10 (3) UL (ref 1.5–7.7)
NEUTROPHILS # BLD AUTO: 4.19 X10(3) UL (ref 1.5–7.7)
NEUTROPHILS NFR BLD AUTO: 55.7 %
OSMOLALITY SERPL CALC.SUM OF ELEC: 287 MOSM/KG (ref 275–295)
PATIENT FASTING Y/N/NP: NO
PLATELET # BLD AUTO: 261 10(3)UL (ref 150–450)
POTASSIUM SERPL-SCNC: 3.4 MMOL/L (ref 3.5–5.1)
RBC # BLD AUTO: 4.37 X10(6)UL
SODIUM SERPL-SCNC: 139 MMOL/L (ref 136–145)
WBC # BLD AUTO: 7.5 X10(3) UL (ref 4–11)

## 2021-08-26 PROCEDURE — 80053 COMPREHEN METABOLIC PANEL: CPT

## 2021-08-26 PROCEDURE — 82306 VITAMIN D 25 HYDROXY: CPT

## 2021-08-26 PROCEDURE — 70250 X-RAY EXAM OF SKULL: CPT | Performed by: INTERNAL MEDICINE

## 2021-08-26 PROCEDURE — 83735 ASSAY OF MAGNESIUM: CPT

## 2021-08-26 PROCEDURE — 83615 LACTATE (LD) (LDH) ENZYME: CPT

## 2021-08-26 PROCEDURE — 36415 COLL VENOUS BLD VENIPUNCTURE: CPT

## 2021-08-26 PROCEDURE — 82728 ASSAY OF FERRITIN: CPT

## 2021-08-26 PROCEDURE — 83516 IMMUNOASSAY NONANTIBODY: CPT

## 2021-08-26 PROCEDURE — 85025 COMPLETE CBC W/AUTO DIFF WBC: CPT

## 2021-08-26 PROCEDURE — 99213 OFFICE O/P EST LOW 20 MIN: CPT | Performed by: FAMILY MEDICINE

## 2021-08-26 PROCEDURE — 85652 RBC SED RATE AUTOMATED: CPT

## 2021-08-26 NOTE — PROGRESS NOTES
CC:  Patient presents with: Follow - Up: lower back pain      HPI: 39year old female presenting for video visit to discuss left groin pain.   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  180 capsule 1   • LORazepam 0.5 MG Oral Tab Take 1 tablet (0.5 mg total) by mouth 2 (two) times daily as needed for Anxie use given and will notify me if worsening       A total of 20 minutes of this visit were spent face to face with the patient and >50% was spent on counseling and coordination of care.      Please note that the following visit was completed using two-way, re

## 2021-08-26 NOTE — PATIENT INSTRUCTIONS
Hernia (Adult)    A hernia can happen when there is a weakness or defect in the wall of the abdomen or groin. Intestines or nearby tissues may move from their usual location and push through the weakness in the wall.  This can cause a hernia (bulge) you m in. If the incarcerated hernia isn’t treated, it may become strangulated. This means the area loses blood supply and the tissue may die. This requires emergency surgery. You need treatment right away. In most cases, a hernia will not heal on its own. You m gas  · Fever of 100.4°F (38°C) or higher, or as directed by your healthcare provider  [employer] last reviewed this educational content on 3/1/2018  © 6883-2273 [employer]. All rights reserved.  This information is not intended as a substitute pain worse. If taking medicines:  · Over-the-counter nausea and diarrhea medicines are generally OK unless you experience fever or blood stool. Check with your doctor first in those circumstances.   · You may use acetaminophen or NSAID medicines like ibupr often.  · Keep in mind that liquids are more important than food right now. · Drink only small amounts of liquids at a time. · Don’t force yourself to eat, especially if you are having cramping, vomiting, or diarrhea.  Don’t eat large amounts at a time, e neck  · Seizure  When to seek medical advice  Call your healthcare provider right away if any of these occur:  · Abdominal pain that gets worse  · Constant lower right abdominal pain  · Continued vomiting and inability to keep liquids down  · Diarrhea more

## 2021-08-27 DIAGNOSIS — E83.51 HYPOCALCEMIA: Primary | ICD-10-CM

## 2021-08-27 DIAGNOSIS — E55.9 VITAMIN D INSUFFICIENCY: ICD-10-CM

## 2021-08-27 LAB
HAV IGM SER QL: 2.2 MG/DL (ref 1.6–2.6)
VIT D+METAB SERPL-MCNC: 37 NG/ML (ref 30–100)

## 2021-08-30 ENCOUNTER — TELEPHONE (OUTPATIENT)
Dept: HEMATOLOGY/ONCOLOGY | Facility: HOSPITAL | Age: 36
End: 2021-08-30

## 2021-08-30 LAB — AQUAPORIN-4 RECEPTOR ANTIBODY: <1.5 U/ML

## 2021-08-30 NOTE — TELEPHONE ENCOUNTER
I spoke with Geno Dillon. Her calcium is low. Asked her to do double the calcium she is taking now, 750 mg daily. Asked her to follow up with Dr Kate Garzon on a referral to endocrine. She verbalized understanding.

## 2021-09-07 ENCOUNTER — LAB ENCOUNTER (OUTPATIENT)
Dept: LAB | Facility: REFERENCE LAB | Age: 36
End: 2021-09-07
Attending: FAMILY MEDICINE
Payer: COMMERCIAL

## 2021-09-07 DIAGNOSIS — R19.7 DIARRHEA, UNSPECIFIED TYPE: ICD-10-CM

## 2021-09-07 PROCEDURE — 87427 SHIGA-LIKE TOXIN AG IA: CPT

## 2021-09-07 PROCEDURE — 87077 CULTURE AEROBIC IDENTIFY: CPT

## 2021-09-07 PROCEDURE — 87045 FECES CULTURE AEROBIC BACT: CPT

## 2021-09-07 PROCEDURE — 87493 C DIFF AMPLIFIED PROBE: CPT

## 2021-09-07 PROCEDURE — 87046 STOOL CULTR AEROBIC BACT EA: CPT

## 2021-09-08 LAB — C DIFF TOX B STL QL: NEGATIVE

## 2021-10-15 ENCOUNTER — TELEPHONE (OUTPATIENT)
Dept: PULMONOLOGY | Facility: CLINIC | Age: 36
End: 2021-10-15

## 2021-10-28 ENCOUNTER — HOSPITAL ENCOUNTER (OUTPATIENT)
Dept: CT IMAGING | Facility: HOSPITAL | Age: 36
Discharge: HOME | End: 2021-10-28
Attending: INTERNAL MEDICINE
Payer: COMMERCIAL

## 2021-10-28 DIAGNOSIS — R91.8 PULMONARY NODULES: ICD-10-CM

## 2021-10-28 PROCEDURE — 82565 ASSAY OF CREATININE: CPT

## 2021-10-28 PROCEDURE — 71260 CT THORAX DX C+: CPT | Performed by: INTERNAL MEDICINE

## 2021-11-03 ENCOUNTER — LAB ENCOUNTER (OUTPATIENT)
Dept: LAB | Facility: REFERENCE LAB | Age: 36
End: 2021-11-03
Attending: FAMILY MEDICINE
Payer: COMMERCIAL

## 2021-11-03 DIAGNOSIS — E83.51 HYPOCALCEMIA: ICD-10-CM

## 2021-11-03 PROCEDURE — 83970 ASSAY OF PARATHORMONE: CPT

## 2021-11-03 PROCEDURE — 82330 ASSAY OF CALCIUM: CPT

## 2021-11-03 PROCEDURE — 36415 COLL VENOUS BLD VENIPUNCTURE: CPT

## 2021-11-04 ENCOUNTER — OFFICE VISIT (OUTPATIENT)
Dept: ENDOCRINOLOGY CLINIC | Facility: CLINIC | Age: 36
End: 2021-11-04
Payer: COMMERCIAL

## 2021-11-04 ENCOUNTER — LABORATORY ENCOUNTER (OUTPATIENT)
Dept: LAB | Age: 36
End: 2021-11-04
Attending: INTERNAL MEDICINE
Payer: COMMERCIAL

## 2021-11-04 ENCOUNTER — OFFICE VISIT (OUTPATIENT)
Dept: PULMONOLOGY | Facility: CLINIC | Age: 36
End: 2021-11-04
Payer: COMMERCIAL

## 2021-11-04 VITALS
RESPIRATION RATE: 16 BRPM | BODY MASS INDEX: 33.66 KG/M2 | HEART RATE: 67 BPM | DIASTOLIC BLOOD PRESSURE: 84 MMHG | HEIGHT: 65 IN | WEIGHT: 202 LBS | SYSTOLIC BLOOD PRESSURE: 120 MMHG

## 2021-11-04 VITALS
HEART RATE: 69 BPM | HEIGHT: 65 IN | WEIGHT: 204 LBS | BODY MASS INDEX: 33.99 KG/M2 | DIASTOLIC BLOOD PRESSURE: 75 MMHG | OXYGEN SATURATION: 98 % | SYSTOLIC BLOOD PRESSURE: 121 MMHG

## 2021-11-04 DIAGNOSIS — E83.51 HYPOCALCEMIA: ICD-10-CM

## 2021-11-04 DIAGNOSIS — E83.51 HYPOCALCEMIA: Primary | ICD-10-CM

## 2021-11-04 DIAGNOSIS — R91.1 PULMONARY NODULE: Primary | ICD-10-CM

## 2021-11-04 PROCEDURE — 36415 COLL VENOUS BLD VENIPUNCTURE: CPT

## 2021-11-04 PROCEDURE — 82652 VIT D 1 25-DIHYDROXY: CPT

## 2021-11-04 PROCEDURE — 80053 COMPREHEN METABOLIC PANEL: CPT

## 2021-11-04 PROCEDURE — 82306 VITAMIN D 25 HYDROXY: CPT

## 2021-11-04 PROCEDURE — 3074F SYST BP LT 130 MM HG: CPT | Performed by: INTERNAL MEDICINE

## 2021-11-04 PROCEDURE — 3008F BODY MASS INDEX DOCD: CPT | Performed by: INTERNAL MEDICINE

## 2021-11-04 PROCEDURE — 83735 ASSAY OF MAGNESIUM: CPT

## 2021-11-04 PROCEDURE — 99213 OFFICE O/P EST LOW 20 MIN: CPT | Performed by: INTERNAL MEDICINE

## 2021-11-04 PROCEDURE — 3078F DIAST BP <80 MM HG: CPT | Performed by: INTERNAL MEDICINE

## 2021-11-04 PROCEDURE — 3079F DIAST BP 80-89 MM HG: CPT | Performed by: INTERNAL MEDICINE

## 2021-11-04 PROCEDURE — 84100 ASSAY OF PHOSPHORUS: CPT

## 2021-11-04 PROCEDURE — 99244 OFF/OP CNSLTJ NEW/EST MOD 40: CPT | Performed by: INTERNAL MEDICINE

## 2021-11-04 RX ORDER — CYCLOBENZAPRINE HCL 10 MG
1 TABLET ORAL NIGHTLY PRN
COMMUNITY
Start: 2021-01-18 | End: 2021-11-19

## 2021-11-04 NOTE — PROGRESS NOTES
The patient is a 58-year-old female who Isela from prior evaluation comes in now for follow-up. She has stopped smoking! .  She has a history of Langerhans' cell histiocytosis suggested by small cystic changes on her CT scan of the chest.  She notes th

## 2021-11-04 NOTE — H&P
New Patient Evaluation - History and Physical    CONSULT - Reason for Visit:  Hypocalcemia. Requesting Physician: Dr. Layla Becker.     CHIEF COMPLAINT:  Patient presents with:  Consult: for hypocalcemia       HISTORY OF PRESENT ILLNESS:   Jarod Kee is a 40 y Bipolar Disorder Brother    • Schizophrenia Brother        CURRENT MEDICATIONS:    Current Outpatient Medications   Medication Sig Dispense Refill   • calcitRIOL 0.5 MCG Oral Cap Take 1 capsule (0.5 mcg total) by mouth daily.  90 capsule 0   • DULoxetine 20 Weight: 202 lb (91.6 kg)   Height: 5' 5\" (1.651 m)     BMI: Body mass index is 33.61 kg/m².      CONSTITUTIONAL:  awake, alert, cooperative, no apparent distress, and appears stated age  PSYCH: normal affect  EYES:  No proptosis, no ptosis, conjunctiva n and imaging studies. During the face to face encounter, I spent an additional 30 minutes which were determined for history-taking, physical examination, and orientation regarding our services.  Greater than 50% of the time was spent in counseling, anticipat

## 2021-11-05 ENCOUNTER — TELEPHONE (OUTPATIENT)
Dept: ENDOCRINOLOGY CLINIC | Facility: CLINIC | Age: 36
End: 2021-11-05

## 2021-11-05 NOTE — TELEPHONE ENCOUNTER
Hi!  Can you please call this patient and let her know that I have reviewed her blood tests and that she no longer has hypocalcemia? I had advised that she 3 tablets of the Citracal Petites daily. Let us revise this and ask her to take only one daily. I would still like her to start taking the cholecalciferol 2,000 international units daily and see me back in 3 months for repeat blood testing. In addition, she must reach out to Dr. Mick Youssef about the numbness that she is having in her fingers and around her mouth just in case this is not related to the calcium? Thank you!

## 2021-11-07 PROBLEM — E83.51 HYPOCALCEMIA: Status: ACTIVE | Noted: 2021-11-07

## 2021-11-17 RX ORDER — LORAZEPAM 0.5 MG/1
0.5 TABLET ORAL 2 TIMES DAILY PRN
Qty: 20 TABLET | Refills: 0 | Status: SHIPPED | OUTPATIENT
Start: 2021-11-17 | End: 2022-01-24

## 2021-11-17 NOTE — TELEPHONE ENCOUNTER
A refill request was received for:  Requested Prescriptions     Pending Prescriptions Disp Refills   • LORazepam 0.5 MG Oral Tab 20 tablet 0     Sig: Take 1 tablet (0.5 mg total) by mouth 2 (two) times daily as needed for Anxiety.  Take 1/2 tablet if too se

## 2021-11-19 PROBLEM — G35 MS (MULTIPLE SCLEROSIS) (HCC): Status: ACTIVE | Noted: 2021-11-19

## 2021-12-23 ENCOUNTER — MED REC SCAN ONLY (OUTPATIENT)
Dept: FAMILY MEDICINE CLINIC | Facility: CLINIC | Age: 36
End: 2021-12-23

## 2022-01-03 ENCOUNTER — TELEPHONE (OUTPATIENT)
Dept: NEUROLOGY | Facility: CLINIC | Age: 37
End: 2022-01-03

## 2022-01-03 NOTE — TELEPHONE ENCOUNTER
Left message for patient and let her know that I had ocrevus [place] paperwork and to see if she was going to continue to see Dr. Mely Garcia since she sees other neurologist.

## 2022-01-12 ENCOUNTER — TELEPHONE (OUTPATIENT)
Dept: FAMILY MEDICINE CLINIC | Facility: CLINIC | Age: 37
End: 2022-01-12

## 2022-01-12 NOTE — TELEPHONE ENCOUNTER
The partner is calling back needing more information. The patient is at the [address] [place] [address] office now.

## 2022-01-12 NOTE — TELEPHONE ENCOUNTER
Called significant other informed to call medical records or sign release of medical information. Medical records number provided and verbalized understanding.

## 2022-01-12 NOTE — TELEPHONE ENCOUNTER
Partner,  Linda Ordaz, called on pts behalf. They are currently at the [facility] in [place], as referred by ALS. States that office has office notes, but was unable to obtain images electronically.      They are requesting most recent MRI and Pet Scan im

## 2022-01-24 RX ORDER — LORAZEPAM 0.5 MG/1
TABLET ORAL
Qty: 20 TABLET | Refills: 0 | Status: SHIPPED | OUTPATIENT
Start: 2022-01-24

## 2022-01-24 NOTE — TELEPHONE ENCOUNTER
A REFILL REQUEST WAS RECEIVED FOR:    Requested Prescriptions     Pending Prescriptions Disp Refills   • LORAZEPAM 0.5 MG Oral Tab [Pharmacy Med Name: LORAZEPAM 0.5MG TABLETS] 20 tablet 0     Sig: TAKE 1 TABLET BY MOUTH TWICE DAILY AS NEEDED FOR ANXIETY.  Tacey Batch

## 2022-03-09 NOTE — PROGRESS NOTES
Major Romero arrived to infusion area ambulating with steady gait. She is here for her 6M Ocrevus to treat MS. Denies any possibility of current pregnancy. PIV started right wrist. Excellent blood return.  Premedicated with Tylenol 650mg, solumedrol 100 mg I
POST OP CHECK    Pt is a 57 yo POD#0 s/p Robotic sacrocolpopexy, posterior repair, midurethral sling, cystoscopy  Patient seen and examined at bedside. Pt is complaining of L eye pain, will not open eye feels like it is stinging. She is also complaining of a headache after surgery.   Abdominal pain is minimal, controlled with current pain medications  She has not had much to eat since surgery, denies N/V  Not yet passing flatus, or having BM.    O:  T(C): 36.6 (03-09-22 @ 16:23), Max: 36.6 (03-09-22 @ 10:16)  HR: 78 (03-09-22 @ 16:23) (72 - 84)  BP: 134/84 (03-09-22 @ 16:23) (111/69 - 134/84)  RR: 18 (03-09-22 @ 16:23) (12 - 18)  SpO2: 95% (03-09-22 @ 16:23) (95% - 100%)    03-09-22 @ 07:01  -  03-09-22 @ 17:28  --------------------------------------------------------  IN: 240 mL / OUT: 0 mL / NET: 240 mL        PE:  General: NAD  Heart: RRR  Lungs: CTABL  Abdomen: soft, mild tenderness, +BS, 5 port site incisions c/d/i  HEENT: EOMI, minimal erythema noted in both eyes bilaterally      A/P: 58y POD#0 s/p Robotic sacrocolpopexy, posterior repair, midurethral sling, cystoscopy  -Continue routine post operative care  -Anesthesia consulted for evaluation of L eye pain s/p surgery, r/o corneal abrasion  -Continue current pain medications, Tylenol for HA.  -home medications ordered  -Regular diet, will see if pt can tolerate PO  -Will d/c IV fluids once tolerating PO and urinating   -Encourage incentive spirometry and ambulation  -Will d/c dobbins in AM, active TOV to follow  -Switch to PO meds in AM

## 2022-05-10 ENCOUNTER — MED REC SCAN ONLY (OUTPATIENT)
Dept: FAMILY MEDICINE CLINIC | Facility: CLINIC | Age: 37
End: 2022-05-10

## 2022-05-10 ENCOUNTER — OFFICE VISIT (OUTPATIENT)
Dept: FAMILY MEDICINE CLINIC | Facility: CLINIC | Age: 37
End: 2022-05-10
Payer: COMMERCIAL

## 2022-05-10 VITALS
BODY MASS INDEX: 32.99 KG/M2 | HEIGHT: 65 IN | DIASTOLIC BLOOD PRESSURE: 80 MMHG | SYSTOLIC BLOOD PRESSURE: 122 MMHG | WEIGHT: 198 LBS | OXYGEN SATURATION: 98 % | HEART RATE: 78 BPM

## 2022-05-10 DIAGNOSIS — R93.0 RADIOLOGICALLY ISOLATED SYNDROME: ICD-10-CM

## 2022-05-10 DIAGNOSIS — Z72.0 TOBACCO ABUSE: ICD-10-CM

## 2022-05-10 DIAGNOSIS — E53.8 B12 DEFICIENCY: ICD-10-CM

## 2022-05-10 DIAGNOSIS — J30.2 SEASONAL ALLERGIES: ICD-10-CM

## 2022-05-10 DIAGNOSIS — L98.9 SKIN LESION OF FACE: ICD-10-CM

## 2022-05-10 DIAGNOSIS — Z00.00 ENCOUNTER FOR ROUTINE ADULT HEALTH EXAMINATION WITHOUT ABNORMAL FINDINGS: Primary | ICD-10-CM

## 2022-05-10 DIAGNOSIS — F32.1 CURRENT MODERATE EPISODE OF MAJOR DEPRESSIVE DISORDER WITHOUT PRIOR EPISODE (HCC): ICD-10-CM

## 2022-05-10 DIAGNOSIS — E55.9 VITAMIN D DEFICIENCY: ICD-10-CM

## 2022-05-10 DIAGNOSIS — Z76.89 ENCOUNTER FOR WEIGHT MANAGEMENT: ICD-10-CM

## 2022-05-10 DIAGNOSIS — R09.81 SINUS CONGESTION: ICD-10-CM

## 2022-05-10 PROBLEM — H43.391 OTHER VITREOUS OPACITIES, RIGHT EYE: Status: ACTIVE | Noted: 2022-05-10

## 2022-05-10 PROBLEM — H43.392 VITREOUS FLOATERS OF LEFT EYE: Status: ACTIVE | Noted: 2022-05-10

## 2022-05-10 PROCEDURE — 99214 OFFICE O/P EST MOD 30 MIN: CPT | Performed by: FAMILY MEDICINE

## 2022-05-10 PROCEDURE — 99395 PREV VISIT EST AGE 18-39: CPT | Performed by: FAMILY MEDICINE

## 2022-05-10 PROCEDURE — 3008F BODY MASS INDEX DOCD: CPT | Performed by: FAMILY MEDICINE

## 2022-05-10 PROCEDURE — 3079F DIAST BP 80-89 MM HG: CPT | Performed by: FAMILY MEDICINE

## 2022-05-10 PROCEDURE — 3074F SYST BP LT 130 MM HG: CPT | Performed by: FAMILY MEDICINE

## 2022-05-10 RX ORDER — AZELASTINE HCL 205.5 UG/1
2 SPRAY NASAL DAILY
Qty: 30 ML | Refills: 2 | Status: SHIPPED | OUTPATIENT
Start: 2022-05-10

## 2022-05-16 ENCOUNTER — LAB ENCOUNTER (OUTPATIENT)
Dept: LAB | Facility: REFERENCE LAB | Age: 37
End: 2022-05-16
Attending: FAMILY MEDICINE
Payer: COMMERCIAL

## 2022-05-16 DIAGNOSIS — E53.8 B12 DEFICIENCY: ICD-10-CM

## 2022-05-16 DIAGNOSIS — Z00.00 ENCOUNTER FOR ROUTINE ADULT HEALTH EXAMINATION WITHOUT ABNORMAL FINDINGS: ICD-10-CM

## 2022-05-16 PROBLEM — E78.2 MIXED HYPERLIPIDEMIA: Status: ACTIVE | Noted: 2019-02-14

## 2022-05-16 LAB
ALBUMIN SERPL-MCNC: 4 G/DL (ref 3.4–5)
ALBUMIN/GLOB SERPL: 1.3 {RATIO} (ref 1–2)
ALP LIVER SERPL-CCNC: 70 U/L
ALT SERPL-CCNC: 36 U/L
ANION GAP SERPL CALC-SCNC: 6 MMOL/L (ref 0–18)
AST SERPL-CCNC: 22 U/L (ref 15–37)
BASOPHILS # BLD AUTO: 0.05 X10(3) UL (ref 0–0.2)
BASOPHILS NFR BLD AUTO: 0.6 %
BILIRUB SERPL-MCNC: 0.4 MG/DL (ref 0.1–2)
BUN BLD-MCNC: 5 MG/DL (ref 7–18)
BUN/CREAT SERPL: 6.9 (ref 10–20)
CALCIUM BLD-MCNC: 8.7 MG/DL (ref 8.5–10.1)
CHLORIDE SERPL-SCNC: 106 MMOL/L (ref 98–112)
CHOLEST SERPL-MCNC: 242 MG/DL (ref ?–200)
CO2 SERPL-SCNC: 24 MMOL/L (ref 21–32)
CREAT BLD-MCNC: 0.72 MG/DL
DEPRECATED RDW RBC AUTO: 44.6 FL (ref 35.1–46.3)
EOSINOPHIL # BLD AUTO: 0.17 X10(3) UL (ref 0–0.7)
EOSINOPHIL NFR BLD AUTO: 1.9 %
ERYTHROCYTE [DISTWIDTH] IN BLOOD BY AUTOMATED COUNT: 12.3 % (ref 11–15)
EST. AVERAGE GLUCOSE BLD GHB EST-MCNC: 105 MG/DL (ref 68–126)
FASTING PATIENT LIPID ANSWER: YES
FASTING STATUS PATIENT QL REPORTED: YES
GLOBULIN PLAS-MCNC: 3.2 G/DL (ref 2.8–4.4)
GLUCOSE BLD-MCNC: 91 MG/DL (ref 70–99)
HBA1C MFR BLD: 5.3 % (ref ?–5.7)
HCT VFR BLD AUTO: 44 %
HDLC SERPL-MCNC: 38 MG/DL (ref 40–59)
HGB BLD-MCNC: 14.9 G/DL
IMM GRANULOCYTES # BLD AUTO: 0.04 X10(3) UL (ref 0–1)
IMM GRANULOCYTES NFR BLD: 0.5 %
LDLC SERPL CALC-MCNC: 140 MG/DL (ref ?–100)
LYMPHOCYTES # BLD AUTO: 2.53 X10(3) UL (ref 1–4)
LYMPHOCYTES NFR BLD AUTO: 28.6 %
MCH RBC QN AUTO: 33 PG (ref 26–34)
MCHC RBC AUTO-ENTMCNC: 33.9 G/DL (ref 31–37)
MCV RBC AUTO: 97.6 FL
MONOCYTES # BLD AUTO: 0.68 X10(3) UL (ref 0.1–1)
MONOCYTES NFR BLD AUTO: 7.7 %
NEUTROPHILS # BLD AUTO: 5.38 X10 (3) UL (ref 1.5–7.7)
NEUTROPHILS # BLD AUTO: 5.38 X10(3) UL (ref 1.5–7.7)
NEUTROPHILS NFR BLD AUTO: 60.7 %
NONHDLC SERPL-MCNC: 204 MG/DL (ref ?–130)
OSMOLALITY SERPL CALC.SUM OF ELEC: 279 MOSM/KG (ref 275–295)
PLATELET # BLD AUTO: 332 10(3)UL (ref 150–450)
POTASSIUM SERPL-SCNC: 3.9 MMOL/L (ref 3.5–5.1)
PROT SERPL-MCNC: 7.2 G/DL (ref 6.4–8.2)
RBC # BLD AUTO: 4.51 X10(6)UL
SODIUM SERPL-SCNC: 136 MMOL/L (ref 136–145)
TRIGL SERPL-MCNC: 351 MG/DL (ref 30–149)
VIT B12 SERPL-MCNC: 326 PG/ML (ref 193–986)
VIT D+METAB SERPL-MCNC: 26 NG/ML (ref 30–100)
VLDLC SERPL CALC-MCNC: 67 MG/DL (ref 0–30)
WBC # BLD AUTO: 8.9 X10(3) UL (ref 4–11)

## 2022-05-16 PROCEDURE — 83036 HEMOGLOBIN GLYCOSYLATED A1C: CPT

## 2022-05-16 PROCEDURE — 82306 VITAMIN D 25 HYDROXY: CPT | Performed by: FAMILY MEDICINE

## 2022-05-16 PROCEDURE — 85025 COMPLETE CBC W/AUTO DIFF WBC: CPT

## 2022-05-16 PROCEDURE — 36415 COLL VENOUS BLD VENIPUNCTURE: CPT

## 2022-05-16 PROCEDURE — 80053 COMPREHEN METABOLIC PANEL: CPT

## 2022-05-16 PROCEDURE — 82607 VITAMIN B-12: CPT

## 2022-05-16 PROCEDURE — 80061 LIPID PANEL: CPT

## 2022-07-05 RX ORDER — DULOXETINE 40 MG/1
1 CAPSULE, DELAYED RELEASE ORAL 2 TIMES DAILY
Qty: 180 CAPSULE | Refills: 1 | Status: SHIPPED | OUTPATIENT
Start: 2022-07-05

## 2022-07-05 RX ORDER — NALTREXONE HYDROCHLORIDE 50 MG/1
50 TABLET, FILM COATED ORAL DAILY
Qty: 90 TABLET | Refills: 0 | Status: SHIPPED | OUTPATIENT
Start: 2022-07-05 | End: 2022-10-02

## 2022-09-26 RX ORDER — DULOXETIN HYDROCHLORIDE 20 MG/1
CAPSULE, DELAYED RELEASE ORAL
Qty: 90 CAPSULE | Refills: 0 | OUTPATIENT
Start: 2022-09-26

## 2022-10-02 NOTE — TELEPHONE ENCOUNTER
Please review; protocol failed.     Requested Prescriptions   Pending Prescriptions Disp Refills    NALTREXONE 50 MG Oral Tab [Pharmacy Med Name: NALTREXONE 50MG TABLETS] 90 tablet 0     Sig: TAKE 1/2 TABLET(25 MG) BY MOUTH DAILY FOR 14 DAYS THEN TAKE 1 TABLET(50 MG) BY MOUTH DAILY        There is no refill protocol information for this order               Recent Outpatient Visits              4 months ago Encounter for routine adult health examination without abnormal findings    [address], [place]    Office Visit    10 months ago MS (multiple sclerosis) [facility])    Neurology - [address], [facility], [address] [facility] [place]    Office Visit    11 months ago Pulmonary nodule    [place], [address] [facility], [place] PAST[address], Nic Atwood MD    Office Visit    11 months ago Hypocalcemia    [facility] Jarvis Jones MD    Office Visit    1 year ago Left inguinal hernia    [address] [facility], [facility], [address] [facility]

## 2022-10-03 RX ORDER — NALTREXONE HYDROCHLORIDE 50 MG/1
50 TABLET, FILM COATED ORAL DAILY
Qty: 90 TABLET | Refills: 0 | OUTPATIENT
Start: 2022-10-03

## 2022-10-03 RX ORDER — NALTREXONE HYDROCHLORIDE 50 MG/1
50 TABLET, FILM COATED ORAL DAILY
Qty: 90 TABLET | Refills: 0 | Status: SHIPPED | OUTPATIENT
Start: 2022-10-03

## 2022-10-29 ENCOUNTER — HOSPITAL ENCOUNTER (OUTPATIENT)
Age: 37
Discharge: HOME | End: 2022-10-29
Payer: COMMERCIAL

## 2022-10-29 VITALS
SYSTOLIC BLOOD PRESSURE: 108 MMHG | RESPIRATION RATE: 18 BRPM | DIASTOLIC BLOOD PRESSURE: 68 MMHG | TEMPERATURE: 97 F | OXYGEN SATURATION: 98 % | HEART RATE: 83 BPM

## 2022-10-29 DIAGNOSIS — Z20.822 ENCOUNTER FOR LABORATORY TESTING FOR COVID-19 VIRUS: ICD-10-CM

## 2022-10-29 DIAGNOSIS — J02.0 STREP PHARYNGITIS: Primary | ICD-10-CM

## 2022-10-29 LAB
S PYO AG THROAT QL: POSITIVE
SARS-COV-2 RNA RESP QL NAA+PROBE: NOT DETECTED

## 2022-10-29 PROCEDURE — 99213 OFFICE O/P EST LOW 20 MIN: CPT | Performed by: NURSE PRACTITIONER

## 2022-10-29 PROCEDURE — 87880 STREP A ASSAY W/OPTIC: CPT | Performed by: NURSE PRACTITIONER

## 2022-10-29 PROCEDURE — U0002 COVID-19 LAB TEST NON-CDC: HCPCS | Performed by: NURSE PRACTITIONER

## 2022-10-29 RX ORDER — AMOXICILLIN 500 MG/1
500 TABLET, FILM COATED ORAL 2 TIMES DAILY
Qty: 20 TABLET | Refills: 0 | Status: SHIPPED | OUTPATIENT
Start: 2022-10-29 | End: 2022-11-08

## 2022-10-29 NOTE — ED INITIAL ASSESSMENT (HPI)
Sore throat, body aches, productive cough with yellow sputum and feels faint when feeling warm for 4 days.

## 2022-11-16 ENCOUNTER — LAB ENCOUNTER (OUTPATIENT)
Dept: LAB | Facility: REFERENCE LAB | Age: 37
End: 2022-11-16
Attending: FAMILY MEDICINE
Payer: COMMERCIAL

## 2022-11-16 ENCOUNTER — OFFICE VISIT (OUTPATIENT)
Dept: FAMILY MEDICINE CLINIC | Facility: CLINIC | Age: 37
End: 2022-11-16
Payer: COMMERCIAL

## 2022-11-16 VITALS
WEIGHT: 191 LBS | HEIGHT: 65 IN | OXYGEN SATURATION: 97 % | SYSTOLIC BLOOD PRESSURE: 118 MMHG | BODY MASS INDEX: 31.82 KG/M2 | DIASTOLIC BLOOD PRESSURE: 72 MMHG | HEART RATE: 62 BPM

## 2022-11-16 DIAGNOSIS — E78.2 MIXED HYPERLIPIDEMIA: ICD-10-CM

## 2022-11-16 DIAGNOSIS — E55.9 VITAMIN D DEFICIENCY: ICD-10-CM

## 2022-11-16 DIAGNOSIS — Z23 NEED FOR VACCINATION: ICD-10-CM

## 2022-11-16 DIAGNOSIS — Z76.89 ENCOUNTER FOR WEIGHT MANAGEMENT: Primary | ICD-10-CM

## 2022-11-16 DIAGNOSIS — Z72.0 TOBACCO ABUSE: ICD-10-CM

## 2022-11-16 PROBLEM — M79.7 FIBROMYALGIA: Status: ACTIVE | Noted: 2022-05-19

## 2022-11-16 LAB
CHOLEST SERPL-MCNC: 233 MG/DL (ref ?–200)
FASTING PATIENT LIPID ANSWER: YES
HDLC SERPL-MCNC: 35 MG/DL (ref 40–59)
LDLC SERPL CALC-MCNC: 134 MG/DL (ref ?–100)
NONHDLC SERPL-MCNC: 198 MG/DL (ref ?–130)
TRIGL SERPL-MCNC: 351 MG/DL (ref 30–149)
VIT D+METAB SERPL-MCNC: 42.5 NG/ML (ref 30–100)
VLDLC SERPL CALC-MCNC: 66 MG/DL (ref 0–30)

## 2022-11-16 PROCEDURE — 90471 IMMUNIZATION ADMIN: CPT | Performed by: FAMILY MEDICINE

## 2022-11-16 PROCEDURE — 80061 LIPID PANEL: CPT

## 2022-11-16 PROCEDURE — 82306 VITAMIN D 25 HYDROXY: CPT

## 2022-11-16 PROCEDURE — 3074F SYST BP LT 130 MM HG: CPT | Performed by: FAMILY MEDICINE

## 2022-11-16 PROCEDURE — 99213 OFFICE O/P EST LOW 20 MIN: CPT | Performed by: FAMILY MEDICINE

## 2022-11-16 PROCEDURE — 3078F DIAST BP <80 MM HG: CPT | Performed by: FAMILY MEDICINE

## 2022-11-16 PROCEDURE — 36415 COLL VENOUS BLD VENIPUNCTURE: CPT

## 2022-11-16 PROCEDURE — 90686 IIV4 VACC NO PRSV 0.5 ML IM: CPT | Performed by: FAMILY MEDICINE

## 2022-11-16 PROCEDURE — 3008F BODY MASS INDEX DOCD: CPT | Performed by: FAMILY MEDICINE

## 2022-12-06 ENCOUNTER — TELEPHONE (OUTPATIENT)
Dept: PULMONOLOGY | Facility: CLINIC | Age: 37
End: 2022-12-06

## 2022-12-06 NOTE — TELEPHONE ENCOUNTER
Pt has an overdue result for CT CHEST     Upon investigation, order was placed on 10/28/2021 and completed. No further action required at this time. Signing encounter.

## 2022-12-29 RX ORDER — NALTREXONE HYDROCHLORIDE 50 MG/1
50 TABLET, FILM COATED ORAL DAILY
Qty: 90 TABLET | Refills: 1 | Status: SHIPPED | OUTPATIENT
Start: 2022-12-29

## 2023-02-01 LAB — AMB EXT COVID-19 RESULT: DETECTED

## 2023-02-02 ENCOUNTER — NURSE TRIAGE (OUTPATIENT)
Facility: CLINIC | Age: 38
End: 2023-02-02

## 2023-02-02 NOTE — TELEPHONE ENCOUNTER
Advised patient of Dr Valarie Pelaez note. Patient verbalized understanding and had no further questions.

## 2023-02-02 NOTE — TELEPHONE ENCOUNTER
Agree she is a candidate and Paxlovid sent.  Thank you very much for pending medication and confirming she had a normal GFR in the last year!!

## 2023-02-28 ENCOUNTER — MED REC SCAN ONLY (OUTPATIENT)
Facility: CLINIC | Age: 38
End: 2023-02-28

## 2023-02-28 PROBLEM — G43.109 BASILAR ARTERY MIGRAINE: Status: ACTIVE | Noted: 2023-02-28

## 2023-02-28 PROBLEM — G35 MULTIPLE SCLEROSIS (HCC): Status: RESOLVED | Noted: 2021-01-25 | Resolved: 2023-02-28

## 2023-02-28 PROBLEM — M54.81 BILATERAL OCCIPITAL NEURALGIA: Status: ACTIVE | Noted: 2023-02-28

## 2023-06-15 RX ORDER — DULOXETIN HYDROCHLORIDE 20 MG/1
20 CAPSULE, DELAYED RELEASE ORAL 2 TIMES DAILY
Qty: 180 CAPSULE | Refills: 0 | Status: SHIPPED | OUTPATIENT
Start: 2023-06-15

## 2023-06-15 RX ORDER — DULOXETINE 40 MG/1
1 CAPSULE, DELAYED RELEASE ORAL 2 TIMES DAILY
Qty: 180 CAPSULE | Refills: 1 | Status: CANCELLED | OUTPATIENT
Start: 2023-06-15

## 2023-06-15 RX ORDER — LORAZEPAM 0.5 MG/1
0.5 TABLET ORAL 2 TIMES DAILY PRN
Qty: 20 TABLET | Refills: 0 | Status: SHIPPED | OUTPATIENT
Start: 2023-06-15

## 2023-06-15 NOTE — TELEPHONE ENCOUNTER
Please review. Protocol failed or has no protocol. Requested Prescriptions   Pending Prescriptions Disp Refills    LORazepam 0.5 MG Oral Tab 20 tablet 0     Sig: Take 1 tablet (0.5 mg total) by mouth 2 (two) times daily as needed for Anxiety.        There is no refill protocol information for this order          Recent Outpatient Visits              7 months ago Encounter for weight management    [address]    Office Visit    1 year ago Encounter for routine adult health examination without abnormal findings    [facility]ia [facility], [place]    Office Visit    1 year ago MS (multiple sclerosis) [facility])    Neurology - [address], [address], [address]    Office Visit    1 year ago Pulmonary nodule    Sharron Vazquez MD    Office Visit    1 year ago Hypocalcemia    [address], Lazaro Marie MD    Office Visit

## 2023-06-15 NOTE — TELEPHONE ENCOUNTER
Please review. Protocol failed / Has no protocol. Patient comment: Requesting 20 MG to take twice daily    Requested Prescriptions   Pending Prescriptions Disp Refills    DULoxetine HCl 40 MG Oral Cap DR Particles 180 capsule 1     Sig: Take 1 capsule by mouth 2 (two) times daily.        Psychiatric Non-Scheduled (Anti-Anxiety) Failed - 6/14/2023  4:51 PM        Failed - In person appointment or virtual visit in the past 6 mos or appointment in next 3 mos     Recent Outpatient Visits              7 months ago Encounter for weight management    Alvester Gell, [address] [place], [place]    Office Visit    1 year ago Encounter for routine adult health examination without abnormal findings    Alvester Gell, [address] [place], [place]    Office Visit    1 year ago MS (multiple sclerosis) [facility])    Neurology - [address], [address], [address]    Office Visit    1 year ago Pulmonary nodule    [facility], [address] [place] [address], [place] PASTOR, Winter Ayala MD    Office Visit    1 year ago Hypocalcemia    [address], [place], [place] Rosibel Rodriguez MD    Office Visit                            Recent Outpatient Visits              7 months ago Encounter for [employer] management    Alvester Gell, [address] [place], [place]    Office Visit    1 year ago Encounter for routine adult health examination without abnormal findings    Alvester Gell, [address] [place], [place]    Office Visit    1 year ago MS (multiple sclerosis) [facility])    Neurology - [address], [address], [address]    Office Visit    1 year ago Pulmonary nodule    Lora Rehman MD    Office Visit    1 year ago Hypocalcemia    [address], Ector Phillips MD    Office Visit

## 2023-07-31 ENCOUNTER — APPOINTMENT (OUTPATIENT)
Dept: GENERAL RADIOLOGY | Age: 38
End: 2023-07-31
Attending: NURSE PRACTITIONER
Payer: COMMERCIAL

## 2023-07-31 ENCOUNTER — HOSPITAL ENCOUNTER (OUTPATIENT)
Age: 38
Discharge: HOME | End: 2023-07-31
Payer: COMMERCIAL

## 2023-07-31 VITALS
OXYGEN SATURATION: 100 % | HEART RATE: 67 BPM | SYSTOLIC BLOOD PRESSURE: 150 MMHG | TEMPERATURE: 97 F | RESPIRATION RATE: 18 BRPM | DIASTOLIC BLOOD PRESSURE: 85 MMHG

## 2023-07-31 DIAGNOSIS — S89.92XA LEG INJURY, LEFT, INITIAL ENCOUNTER: Primary | ICD-10-CM

## 2023-07-31 DIAGNOSIS — T14.8XXA CONTUSION OF SOFT TISSUE: ICD-10-CM

## 2023-07-31 PROCEDURE — 99213 OFFICE O/P EST LOW 20 MIN: CPT | Performed by: NURSE PRACTITIONER

## 2023-07-31 PROCEDURE — 73590 X-RAY EXAM OF LOWER LEG: CPT | Performed by: NURSE PRACTITIONER

## 2023-07-31 NOTE — ED INITIAL ASSESSMENT (HPI)
Pt here with complaints to pain on her left shin, pt states 2 weeks her dog ran in to her the dogs head hit her left leg, pt states she was having pain for 2 weeks and now 2 days ago she developed swelling and a bump to her left shin , pt denies any pain with walking

## 2023-10-24 NOTE — TELEPHONE ENCOUNTER
Paged at 7:05 PM  Called  Back at 7:06  PM    Pt states she his having a severe bilateral HA;  Reports severe jaw pain; states she has significant head pressure. Agrees this is a more severe form of her typical HA.   Reports new burning/migrating paraesthes normal gait and station , full range of motion

## 2024-02-12 ENCOUNTER — HOSPITAL ENCOUNTER (OUTPATIENT)
Age: 39
Discharge: HOME | End: 2024-02-12
Payer: COMMERCIAL

## 2024-02-12 VITALS
SYSTOLIC BLOOD PRESSURE: 132 MMHG | DIASTOLIC BLOOD PRESSURE: 78 MMHG | RESPIRATION RATE: 18 BRPM | TEMPERATURE: 98 F | BODY MASS INDEX: 30.82 KG/M2 | HEIGHT: 65 IN | HEART RATE: 70 BPM | OXYGEN SATURATION: 98 % | WEIGHT: 185 LBS

## 2024-02-12 DIAGNOSIS — L02.91 ABSCESS: Primary | ICD-10-CM

## 2024-02-12 PROCEDURE — 87077 CULTURE AEROBIC IDENTIFY: CPT | Performed by: EMERGENCY MEDICINE

## 2024-02-12 PROCEDURE — 99213 OFFICE O/P EST LOW 20 MIN: CPT | Performed by: EMERGENCY MEDICINE

## 2024-02-12 PROCEDURE — 87205 SMEAR GRAM STAIN: CPT | Performed by: EMERGENCY MEDICINE

## 2024-02-12 PROCEDURE — 87070 CULTURE OTHR SPECIMN AEROBIC: CPT | Performed by: EMERGENCY MEDICINE

## 2024-02-12 PROCEDURE — 10060 I&D ABSCESS SIMPLE/SINGLE: CPT | Performed by: EMERGENCY MEDICINE

## 2024-02-12 PROCEDURE — 87186 SC STD MICRODIL/AGAR DIL: CPT | Performed by: EMERGENCY MEDICINE

## 2024-02-12 RX ORDER — IBUPROFEN 600 MG/1
600 TABLET ORAL EVERY 8 HOURS PRN
Qty: 30 TABLET | Refills: 0 | Status: CANCELLED | OUTPATIENT
Start: 2024-02-12

## 2024-02-12 RX ORDER — HYDROCODONE BITARTRATE AND ACETAMINOPHEN 5; 325 MG/1; MG/1
1 TABLET ORAL ONCE
Status: COMPLETED | OUTPATIENT
Start: 2024-02-12 | End: 2024-02-12

## 2024-02-12 RX ORDER — HYDROCODONE BITARTRATE AND ACETAMINOPHEN 5; 325 MG/1; MG/1
TABLET ORAL
Qty: 10 TABLET | Refills: 0 | Status: SHIPPED | OUTPATIENT
Start: 2024-02-12 | End: 2024-02-14

## 2024-02-13 NOTE — ED PROVIDER NOTES
Patient Seen in: [facility]      History     Chief Complaint   Patient presents with    Pain     Stated Complaint: possible hernia    Subjective:   HPI    Sidra Laird is a 38 year old female here for pain and swelling to left groin area. No fnvd. No drainage. Immunization up to date. Otherwise well appearing.     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   Tobacco Use    Smoking status: Former     Packs/day: 0.00     Years: 20.00     Additional pack years: 0.00     Total pack years: 0.00     Types: Cigarettes     Quit date: 2020     Years since quitting: 3.2    Smokeless tobacco: Never   Vaping Use    Vaping Use: Never used   Substance and Sexual Activity    Alcohol use: Yes     Alcohol/week: 7.0 standard drinks of alcohol     Types: 7 Glasses of wine per week     Comment: 2-3 glasses of wine daily     Drug use: Yes     Frequency: 5.0 times per week     Types: Cannabis     Comment: for sleep    Sexual activity: Yes     Partners: Male   Social History Narrative    No h/o of abuse        Lives with son               Review of Systems    Positive for stated complaint: possible hernia  Other systems are as noted in HPI.  Constitutional and vital signs reviewed.      All other systems reviewed and negative except as noted above.    Physical Exam     ED Triage Vitals [24 1716]   /78   Pulse 70   Resp 18   Temp 97.6 °F (36.4 °C)   Temp src Temporal   SpO2 98 %   O2 Device None (Room air)       Current:/78   Pulse 70   Temp 97.6 °F (36.4 °C) (Temporal)    Resp 18   Ht 165.1 cm (5' 5\")   Wt 83.9 kg   LMP 02/06/2017   SpO2 98%   BMI 30.79 kg/m²         Physical Exam  Vitals and nursing note reviewed.   Constitutional:       Appearance: Normal appearance.   Cardiovascular:      Rate and Rhythm: Normal rate.      Pulses: Normal pulses.   Pulmonary:      Effort: Pulmonary effort is normal. No respiratory distress.   Genitourinary:      Skin:     Capillary Refill: Capillary refill takes less than 2 seconds.      Findings: No bruising or lesion.   Neurological:      Mental Status: She is alert.   Psychiatric:         Mood and Affect: Mood normal.         Behavior: Behavior normal.         Thought Content: Thought content normal.         Judgment: Judgment normal.             ED Course     Labs Reviewed   AEROBIC BACTERIAL CULTURE             Pt Identifiers completed. Correct location marked. The abscess was anesthetized with lidocaine. A verticle incision was performed with an 11 blade.  Pus was expressed and loculations were freed.   Wound packed and dressed with .25 iodiform. Pt tolerate procedure well. <1cc blood loss.  Culture pending.          MDM                                        Medical Decision Making  Abscess vs hernia vs simple boil vs herpes.     Tx for abscess. I&D done w/o difficulty. Culture sent. Xeroform and guaze applied with paper tape. No FNV, or other complaints. Jackson given p/t dc home. Family driving.   Pcp f/u only as needed. Remove packing can be done at home. Defer abx at this time. Bnorco top pharm as needed for break through pain. Has home IBU.   All questions answered, no acute distress cleared for home.     Problems Addressed:  Abscess: acute illness or injury    Amount and/or Complexity of Data Reviewed  External Data Reviewed: notes.  Labs: ordered. Decision-making details documented in ED Course.     Details: pending  ECG/medicine tests: ordered and independent interpretation performed. Decision-making details documented in ED  Course.     Details: 5mg norco verified with nurse.     Risk  OTC drugs.  Prescription drug management.        Disposition and Plan     Clinical Impression:  1. Abscess         Disposition:  Discharge  2/12/2024  6:54 pm    Follow-up:  Farzana Oscar DO  [address] [place] [address]  [phone]                Medications Prescribed:  Discharge Medication List as of 2/12/2024  6:57 PM        START taking these medications    Details   HYDROcodone-acetaminophen 5-325 MG Oral Tab Half to 1 full tab every 4-6 hours as needed for breakthrough pain., Normal, Disp-10 tablet, R-0

## 2024-02-13 NOTE — DISCHARGE INSTRUCTIONS
Take packing out if it has not fallen out in the next 48 to 72 hours.   Norco was given here and sent to the pharmacy. Tomorrow change dressing every 3-4 hours or if visibly soiled. You can cut  a small piece of the yellow guaze given and apply to each dressing changes.   We will call you with culture results  when It returns. Antibiotics are not indicated at this time. But if symptoms get worse, and culture comes back positive for bacteria you may needs one. We will assess that at the time of phone call.   Go to the ER for fever, and increased swelling/redness to groin/absess region.

## 2024-02-14 ENCOUNTER — LAB ENCOUNTER (OUTPATIENT)
Dept: LAB | Facility: REFERENCE LAB | Age: 39
End: 2024-02-14
Attending: FAMILY MEDICINE
Payer: COMMERCIAL

## 2024-02-14 ENCOUNTER — OFFICE VISIT (OUTPATIENT)
Facility: CLINIC | Age: 39
End: 2024-02-14
Payer: COMMERCIAL

## 2024-02-14 VITALS
OXYGEN SATURATION: 98 % | DIASTOLIC BLOOD PRESSURE: 78 MMHG | HEART RATE: 86 BPM | SYSTOLIC BLOOD PRESSURE: 110 MMHG | WEIGHT: 191 LBS | BODY MASS INDEX: 31.82 KG/M2 | HEIGHT: 65 IN

## 2024-02-14 DIAGNOSIS — F41.9 ANXIETY: ICD-10-CM

## 2024-02-14 DIAGNOSIS — Z00.00 ENCOUNTER FOR ROUTINE ADULT HEALTH EXAMINATION WITHOUT ABNORMAL FINDINGS: Primary | ICD-10-CM

## 2024-02-14 DIAGNOSIS — Z78.9 VEGETARIAN DIET: ICD-10-CM

## 2024-02-14 DIAGNOSIS — R91.1 PULMONARY NODULE: ICD-10-CM

## 2024-02-14 DIAGNOSIS — E55.9 VITAMIN D DEFICIENCY: ICD-10-CM

## 2024-02-14 DIAGNOSIS — Z23 NEED FOR VACCINATION: ICD-10-CM

## 2024-02-14 DIAGNOSIS — Z12.31 SCREENING MAMMOGRAM, ENCOUNTER FOR: ICD-10-CM

## 2024-02-14 DIAGNOSIS — Z00.00 ENCOUNTER FOR ROUTINE ADULT HEALTH EXAMINATION WITHOUT ABNORMAL FINDINGS: ICD-10-CM

## 2024-02-14 DIAGNOSIS — E23.7 PITUITARY LESION (HCC): ICD-10-CM

## 2024-02-14 PROBLEM — F32.4 MAJOR DEPRESSIVE DISORDER WITH SINGLE EPISODE, IN PARTIAL REMISSION (HCC): Status: ACTIVE | Noted: 2020-10-13

## 2024-02-14 PROBLEM — H43.391 VITREOUS FLOATERS OF RIGHT EYE: Status: ACTIVE | Noted: 2024-02-14

## 2024-02-14 PROBLEM — E83.51 HYPOCALCEMIA: Status: RESOLVED | Noted: 2021-11-07 | Resolved: 2024-02-14

## 2024-02-14 PROBLEM — F32.4 MAJOR DEPRESSIVE DISORDER WITH SINGLE EPISODE, IN PARTIAL REMISSION: Status: ACTIVE | Noted: 2020-10-13

## 2024-02-14 LAB
ALBUMIN SERPL-MCNC: 4.5 G/DL (ref 3.2–4.8)
ALBUMIN/GLOB SERPL: 1.6 {RATIO} (ref 1–2)
ALP LIVER SERPL-CCNC: 57 U/L
ALT SERPL-CCNC: 25 U/L
ANION GAP SERPL CALC-SCNC: 8 MMOL/L (ref 0–18)
AST SERPL-CCNC: 20 U/L (ref ?–34)
BASOPHILS # BLD AUTO: 0.04 X10(3) UL (ref 0–0.2)
BASOPHILS NFR BLD AUTO: 0.6 %
BILIRUB SERPL-MCNC: 0.4 MG/DL (ref 0.3–1.2)
BUN BLD-MCNC: 6 MG/DL (ref 9–23)
BUN/CREAT SERPL: 8.7 (ref 10–20)
CALCIUM BLD-MCNC: 9.4 MG/DL (ref 8.7–10.4)
CHLORIDE SERPL-SCNC: 110 MMOL/L (ref 98–112)
CHOLEST SERPL-MCNC: 239 MG/DL (ref ?–200)
CO2 SERPL-SCNC: 21 MMOL/L (ref 21–32)
CREAT BLD-MCNC: 0.69 MG/DL
DEPRECATED RDW RBC AUTO: 42 FL (ref 35.1–46.3)
EGFRCR SERPLBLD CKD-EPI 2021: 113 ML/MIN/1.73M2 (ref 60–?)
EOSINOPHIL # BLD AUTO: 0.18 X10(3) UL (ref 0–0.7)
EOSINOPHIL NFR BLD AUTO: 2.7 %
ERYTHROCYTE [DISTWIDTH] IN BLOOD BY AUTOMATED COUNT: 11.8 % (ref 11–15)
EST. AVERAGE GLUCOSE BLD GHB EST-MCNC: 108 MG/DL (ref 68–126)
FASTING PATIENT LIPID ANSWER: YES
FASTING STATUS PATIENT QL REPORTED: YES
GLOBULIN PLAS-MCNC: 2.9 G/DL (ref 2.8–4.4)
GLUCOSE BLD-MCNC: 97 MG/DL (ref 70–99)
HBA1C MFR BLD: 5.4 % (ref ?–5.7)
HCT VFR BLD AUTO: 44.4 %
HDLC SERPL-MCNC: 31 MG/DL (ref 40–59)
HGB BLD-MCNC: 16.1 G/DL
IMM GRANULOCYTES # BLD AUTO: 0.02 X10(3) UL (ref 0–1)
IMM GRANULOCYTES NFR BLD: 0.3 %
LDLC SERPL CALC-MCNC: 138 MG/DL (ref ?–100)
LYMPHOCYTES # BLD AUTO: 2.44 X10(3) UL (ref 1–4)
LYMPHOCYTES NFR BLD AUTO: 36.4 %
MCH RBC QN AUTO: 35 PG (ref 26–34)
MCHC RBC AUTO-ENTMCNC: 36.3 G/DL (ref 31–37)
MCV RBC AUTO: 96.5 FL
MONOCYTES # BLD AUTO: 0.5 X10(3) UL (ref 0.1–1)
MONOCYTES NFR BLD AUTO: 7.5 %
NEUTROPHILS # BLD AUTO: 3.53 X10 (3) UL (ref 1.5–7.7)
NEUTROPHILS # BLD AUTO: 3.53 X10(3) UL (ref 1.5–7.7)
NEUTROPHILS NFR BLD AUTO: 52.5 %
NONHDLC SERPL-MCNC: 208 MG/DL (ref ?–130)
OSMOLALITY SERPL CALC.SUM OF ELEC: 286 MOSM/KG (ref 275–295)
PLATELET # BLD AUTO: 276 10(3)UL (ref 150–450)
POTASSIUM SERPL-SCNC: 4.2 MMOL/L (ref 3.5–5.1)
PROT SERPL-MCNC: 7.4 G/DL (ref 5.7–8.2)
RBC # BLD AUTO: 4.6 X10(6)UL
SODIUM SERPL-SCNC: 139 MMOL/L (ref 136–145)
TRIGL SERPL-MCNC: 385 MG/DL (ref 30–149)
VIT B12 SERPL-MCNC: 342 PG/ML (ref 211–911)
VIT D+METAB SERPL-MCNC: 26 NG/ML (ref 30–100)
VLDLC SERPL CALC-MCNC: 73 MG/DL (ref 0–30)
WBC # BLD AUTO: 6.7 X10(3) UL (ref 4–11)

## 2024-02-14 PROCEDURE — 82607 VITAMIN B-12: CPT

## 2024-02-14 PROCEDURE — 80061 LIPID PANEL: CPT

## 2024-02-14 PROCEDURE — 80053 COMPREHEN METABOLIC PANEL: CPT

## 2024-02-14 PROCEDURE — 83036 HEMOGLOBIN GLYCOSYLATED A1C: CPT

## 2024-02-14 PROCEDURE — 82306 VITAMIN D 25 HYDROXY: CPT

## 2024-02-14 PROCEDURE — 85025 COMPLETE CBC W/AUTO DIFF WBC: CPT

## 2024-02-14 PROCEDURE — 36415 COLL VENOUS BLD VENIPUNCTURE: CPT

## 2024-02-14 RX ORDER — LORAZEPAM 0.5 MG/1
0.5 TABLET ORAL 2 TIMES DAILY PRN
Qty: 20 TABLET | Refills: 0 | Status: SHIPPED | OUTPATIENT
Start: 2024-02-14

## 2024-02-14 RX ORDER — AMOXICILLIN AND CLAVULANATE POTASSIUM 875; 125 MG/1; MG/1
1 TABLET, FILM COATED ORAL 2 TIMES DAILY
Qty: 14 TABLET | Refills: 0 | Status: SHIPPED | OUTPATIENT
Start: 2024-02-14 | End: 2024-02-21
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
## 2024-03-01 ENCOUNTER — HOSPITAL ENCOUNTER (OUTPATIENT)
Dept: CT IMAGING | Facility: HOSPITAL | Age: 39
Discharge: HOME | End: 2024-03-01
Attending: FAMILY MEDICINE
Payer: COMMERCIAL

## 2024-03-01 DIAGNOSIS — R91.1 PULMONARY NODULE: ICD-10-CM

## 2024-03-01 PROCEDURE — 71250 CT THORAX DX C-: CPT | Performed by: FAMILY MEDICINE

## 2024-04-01 ENCOUNTER — HOSPITAL ENCOUNTER (OUTPATIENT)
Dept: MRI IMAGING | Facility: HOSPITAL | Age: 39
Discharge: HOME | End: 2024-04-01
Attending: FAMILY MEDICINE
Payer: COMMERCIAL

## 2024-04-01 DIAGNOSIS — E23.7 PITUITARY LESION (HCC): ICD-10-CM

## 2024-04-01 PROCEDURE — 70553 MRI BRAIN STEM W/O & W/DYE: CPT | Performed by: FAMILY MEDICINE

## 2024-04-01 PROCEDURE — A9575 INJ GADOTERATE MEGLUMI 0.1ML: HCPCS | Performed by: FAMILY MEDICINE

## 2024-04-01 RX ORDER — GADOTERATE MEGLUMINE 376.9 MG/ML
20 INJECTION INTRAVENOUS
Status: COMPLETED | OUTPATIENT
Start: 2024-04-01 | End: 2024-04-01

## 2024-04-01 RX ADMIN — GADOTERATE MEGLUMINE 18 ML: 376.9 INJECTION INTRAVENOUS at 13:50:00

## 2024-04-02 PROBLEM — E23.6 RATHKE'S CLEFT CYST (HCC): Status: ACTIVE | Noted: 2024-04-02

## 2024-04-28 ENCOUNTER — PATIENT MESSAGE (OUTPATIENT)
Facility: CLINIC | Age: 39
End: 2024-04-28

## 2024-04-29 ENCOUNTER — APPOINTMENT (OUTPATIENT)
Dept: MRI IMAGING | Facility: HOSPITAL | Age: 39
End: 2024-04-29
Attending: EMERGENCY MEDICINE
Payer: COMMERCIAL

## 2024-04-29 ENCOUNTER — NURSE TRIAGE (OUTPATIENT)
Facility: CLINIC | Age: 39
End: 2024-04-29

## 2024-04-29 ENCOUNTER — HOSPITAL ENCOUNTER (EMERGENCY)
Facility: HOSPITAL | Age: 39
Discharge: HOME | End: 2024-04-29
Attending: EMERGENCY MEDICINE
Payer: COMMERCIAL

## 2024-04-29 VITALS
TEMPERATURE: 99 F | HEART RATE: 60 BPM | SYSTOLIC BLOOD PRESSURE: 151 MMHG | DIASTOLIC BLOOD PRESSURE: 79 MMHG | OXYGEN SATURATION: 97 % | BODY MASS INDEX: 30.82 KG/M2 | RESPIRATION RATE: 18 BRPM | HEIGHT: 65 IN | WEIGHT: 185 LBS

## 2024-04-29 DIAGNOSIS — R47.02 DYSPHASIA: Primary | ICD-10-CM

## 2024-04-29 DIAGNOSIS — G37.9 DEMYELINATING CHANGES IN BRAIN (HCC): Primary | ICD-10-CM

## 2024-04-29 DIAGNOSIS — R51.9 PRESSURE IN HEAD: ICD-10-CM

## 2024-04-29 DIAGNOSIS — H53.8 BLURRY VISION: ICD-10-CM

## 2024-04-29 PROCEDURE — 99284 EMERGENCY DEPT VISIT MOD MDM: CPT

## 2024-04-29 PROCEDURE — 70553 MRI BRAIN STEM W/O & W/DYE: CPT | Performed by: EMERGENCY MEDICINE

## 2024-04-29 PROCEDURE — A9575 INJ GADOTERATE MEGLUMI 0.1ML: HCPCS | Performed by: EMERGENCY MEDICINE

## 2024-04-29 PROCEDURE — 99285 EMERGENCY DEPT VISIT HI MDM: CPT

## 2024-04-29 RX ORDER — GADOTERATE MEGLUMINE 376.9 MG/ML
20 INJECTION INTRAVENOUS
Status: COMPLETED | OUTPATIENT
Start: 2024-04-29 | End: 2024-04-29

## 2024-04-29 NOTE — TELEPHONE ENCOUNTER
Please reply to pool: EM RN TRIAGE  Action Requested: Summary for Provider     []  Critical Lab, Recommendations Needed  [x] Need Additional Advice  []   FYI    []   Need Orders  [] Need Medications Sent to Pharmacy  []  Other     SUMMARY: Patient contacts clinic with a multitude of symptoms that she states are ongoing.  History of pituitary cyst.  Scalp tingling, slurred speech, lip and tongue numbness, depression, states her pupils are different sizes today.  Pressure in head, occasional blurred vision.  Nurse advised that these symptoms are worrisome and she should proceed to ER.  She states she has been worked up extensively and declines ER evaluation at this time.  She is requesting neurology referral or other recommendations.   Dr. Oscar please advise.     Reason for call: Acute  Onset: Data Unavailable                       Reason for Disposition   Neurologic deficit that was brief (now gone), ANY of the following: * Weakness of the face, arm, or leg on one side of the body * Numbness of the face, arm, or leg on one side of the body * Loss of speech or garbled speech    Protocols used: Neurologic Deficit-A-OH

## 2024-04-29 NOTE — ED INITIAL ASSESSMENT (HPI)
Pt. Presents with dizziness, nausea, and numbness to tip of tongue and center of her lips x 1.5 weeks. Reports that 2 days ago her pupils were different sizes and she messaged her doctor who told her to come to the ER today.     Pt. Also reports worsening intermittent \"feeling foggy\" and has tingling/pressure in her head that has been going on for years, but has gotten worse in the past 1.5 weeks. (Reports hx of cyst in her brain as well. Got MRI here a couple weeks ago.)    Denies Vomiting/Diarrhea. Denies any other URI s/s.

## 2024-04-29 NOTE — TELEPHONE ENCOUNTER
From: Sidra Laird  To: Farzana Oscar  Sent: 4/28/2024 8:53 AM CDT  Subject: New/Ongoing Symptoms - Referrals    Hello Dr. Oscar,    I could not find Dr. Abarca anywhere or listed at [facility] so I would like to get a referral to a neurologist. Also, If there is a different specialist for the cyst, I would like to get a referral as well. I have some new symptoms going on in the last week or two and wanted to document the symptoms that have been ongoing.     Ongoing: hand pain & weakness, occasional blurry vision or black splotches, foggy mindedness and difficulty forming/remembering words after feeling head/neck tightness and scalp tingling, occasional slurred speech, extreme depression/sobbing/exhaustion after Exercise (lasts 1-3 days), lumps/pain in wrists, fatigue, nausea w/medication & vitamins (especially D vitamin), other occasional nausea.     New symptoms: 4/17 tongue/lip numbness ongoing (occurred during MS diagnosis but has not happened very often since until now.) 4/22 - daily dizzy spells ongoing, 4/27 - last night my right pupil was much larger than the other, this morning it is still noticeably larger but not to such a great degree.     Let me know what referrals you suggest. Thank you so much!    Kind regards,  Sidra

## 2024-04-29 NOTE — TELEPHONE ENCOUNTER
Agree with ER evaluation given current symptoms, especially unequal pupil size. I did sign off on neurology referral as well.

## 2024-04-30 NOTE — DISCHARGE INSTRUCTIONS
Please follow-up with the one of the neurologists listed above, they are partners in the same clinic.

## 2024-04-30 NOTE — ED QUICK NOTES
MRI called and said that they will get pt in the next 10-15 mins. MRI tech informed me that one of the scanners is down

## 2024-04-30 NOTE — ED PROVIDER NOTES
Patient Seen in: [facility] Emergency Department      History     Chief Complaint   Patient presents with    Dizziness     Stated Complaint: dizziness, numbness to mouth    Subjective:   HPI  38-year-old female presents for evaluation of multiple symptoms.  2 days ago she noticed that her right pupil was much larger than the left.  This is now resolved.  For the past 12 days approximately she has had tongue and lip numbness and tingling.  She is also having frequent lightheaded spells.  She also has ongoing brain fog, occasional slurred speech and blurry vision, occasional difficulty remembering words, symptoms of exhaustion and sobbing after exercise, pain in the wrists and fatigue which have been ongoing for several months.  She has been extensively worked up at outside facilities.  Her primary care doctor advised that she come to the ED tonight.      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   Tobacco Use    Smoking status: Every Day     Current packs/day: 0.00     Average packs/day: 1 pack/day for 20.0 years (20.0 ttl pk-yrs)     Types: Cigarettes     Start date: 2000     Last attempt to quit: 2020     Years since quitting: 3.4    Smokeless tobacco: Never   Vaping Use    Vaping status: Never Used   Substance and Sexual Activity    Alcohol use: Yes     Alcohol/week: 7.0 standard drinks of alcohol     Types: 7 Glasses of wine per week     Comment: 2-3 glasses of wine about 4x/week    Drug use: Not Currently    Sexual activity: Yes     Partners: Male   Social History Narrative    No h/o of  abuse        Lives with son      Social Determinants of Health     Financial Resource Strain: High Risk (1/9/2022)    Received from [facility]    Overall Financial Resource Strain (CARDIA)     Difficulty of Paying Living Expenses: Hard   Food Insecurity: No Food Insecurity (1/9/2022)    Received from [facility]    Hunger Vital Sign     Worried About Running Out of Food in the Last Year: Never true     Ran Out of Food in the Last Year: Never true   Transportation Needs: No Transportation Needs (1/9/2022)    Received from [facility]    PRAPARE - Transportation     Lack of Transportation (Medical): No     Lack of Transportation (Non-Medical): No   Physical Activity: Insufficiently Active (1/9/2022)    Received from [facility]    Exercise Vital Sign     Days of Exercise per Week: 3 days     Minutes of Exercise per Session: 30 min   Stress: Stress Concern Present (1/9/2022)    Received from [facility]    [employer] - Occupational Stress Questionnaire     Feeling of Stress : To some extent   Social Connections: Socially Isolated (1/9/2022)    Received from [facility]    Social Connection and Isolation Panel [NHANES]     Frequency of Communication with Friends and Family: Once a week     Frequency of Social Gatherings with Friends and Family: Never     Attends Rastafari Services: Never     Active Member of Clubs or Organizations: No     Attends Club or Organization Meetings: Never     Marital Status: Living with partner   Housing Stability: Low Risk  (1/9/2022)    Received from [facility]    Housing Stability Vital Sign     Unable to Pay for Housing in the Last Year: No     In the last 12 months, how many places have you lived?: 2     In the last 12 months, was there a time when you did not have a steady place to sleep or slept in a shelter (including now)?: No              Review of Systems    Positive for stated complaint: dizziness, numbness to mouth  Other systems are as noted in  HPI.  Constitutional and vital signs reviewed.      All other systems reviewed and negative except as noted above.    Physical Exam     ED Triage Vitals [04/29/24 1526]   /76   Pulse 68   Resp 18   Temp 98.5 °F (36.9 °C)   Temp src Oral   SpO2 97 %   O2 Device None (Room air)       Current:/79   Pulse 60   Temp 98.5 °F (36.9 °C) (Oral)   Resp 18   Ht 165.1 cm (5' 5\")   Wt 83.9 kg   LMP 02/06/2017   SpO2 97%   BMI 30.79 kg/m²         Physical Exam  Vitals and nursing note reviewed.   Constitutional:       Appearance: She is well-developed.   HENT:      Head: Normocephalic and atraumatic.   Eyes:      General: No visual field deficit.     Extraocular Movements: Extraocular movements intact.      Pupils: Pupils are equal, round, and reactive to light. Pupils are equal.   Cardiovascular:      Rate and Rhythm: Normal rate and regular rhythm.      Heart sounds: Normal heart sounds.   Pulmonary:      Effort: Pulmonary effort is normal.      Breath sounds: Normal breath sounds.   Abdominal:      General: There is no distension.      Palpations: Abdomen is soft.      Tenderness: There is no abdominal tenderness.   Musculoskeletal:         General: Normal range of motion.      Cervical back: Normal range of motion.   Skin:     General: Skin is warm.      Capillary Refill: Capillary refill takes less than 2 seconds.   Neurological:      Mental Status: She is alert.      GCS: GCS eye subscore is 4. GCS verbal subscore is 5. GCS motor subscore is 6.      Cranial Nerves: No cranial nerve deficit, dysarthria or facial asymmetry.      Sensory: No sensory deficit.      Motor: No weakness.      Coordination: Romberg sign negative. Coordination normal.      Gait: Gait normal.      Comments: No focal deficits       Differential diagnosis includes but is not limited to active demyelinating disease, space-occupying lesion, aneurysm        ED Course   Labs Reviewed - No data to display     MRI BRAIN (W+WO)  (CPT=70553)    Result Date: 4/29/2024  CONCLUSION:  Mild-to-moderate burden of demyelinating lesions in the supratentorial periventricular white matter, mild to moderately increased since the comparison 2021 brain MRI.  No enhancing lesions to suggest active demyelination.  Solitary black hole type lesion within the left frontal lobe.  8 mm left pituitary lesion.  See the MRI of the brain from 04/01/2024 regarding pituitary findings.     Dictated by (CST): Yulia Stone MD on 4/29/2024 at 10:03 PM     Finalized by (CST): Yulia Stone MD on 4/29/2024 at 10:19 PM                       MDM                                       Medical Decision Making  Vitals are stable in the ED.  No focal deficits.  Pupils are equal.  I ordered and interpreted MRI brain with and without contrast and she does not have any active demyelinating lesions.  I d/w with Dr. Ye who advises follow-up in neurology clinic.    Problems Addressed:  Demyelinating changes in brain (HCC): complicated acute illness or injury with systemic symptoms    Amount and/or Complexity of Data Reviewed  External Data Reviewed: notes.     Details: I reviewed neurology clinic note from Dr. Abarca at [facility] on 2/27/23 which discusses her evaluation at the [facility] and she was diagnosed with radiologically isolated syndrome and could not include demyelinating process she had 5 oligoclonal bands in the CSF. she was also diagnosed with basilar artery migraine.    Radiology: ordered and independent interpretation performed. Decision-making details documented in ED Course.  Discussion of management or test interpretation with external provider(s): As above        Disposition and Plan     Clinical Impression:  1. Demyelinating changes in brain (HCC)         Disposition:  Discharge  4/29/2024 10:33 pm    Follow-up:  Sigifredo Martinez DO  [address]  [facility] [address]  [phone]    Follow up      Martin Ye MD  [address]  [facility] [address]  [phone]    Follow up            Medications Prescribed:  Discharge Medication List as of 4/29/2024 10:39 PM

## 2024-05-03 ENCOUNTER — TELEPHONE (OUTPATIENT)
Facility: CLINIC | Age: 39
End: 2024-05-03

## 2024-05-03 DIAGNOSIS — E23.6 RATHKE'S CLEFT CYST (HCC): Primary | ICD-10-CM

## 2024-05-03 NOTE — TELEPHONE ENCOUNTER
.Spoke to patient, verified Name and . Notified that referral was signed. Endocrinology contact number provided.

## 2024-05-03 NOTE — TELEPHONE ENCOUNTER
Patient called, verified Name and . States she was seen in ED on  per PCP's recommendation and was also asked to follow up with Dr. Martinez. No appointment available with Dr. Martinez so patient ended up seeing Dr. Abarca at [facility] today. Care Everywhere updated. States the cyst in her brain has grown in size with signs of bleeding and was advised to see an endocrinologist as soon as possible.    Dr. Matos (for Dr. Oscar)  please see pended referral for review and approval.

## 2024-05-06 ENCOUNTER — TELEPHONE (OUTPATIENT)
Facility: CLINIC | Age: 39
End: 2024-05-06

## 2024-05-06 NOTE — TELEPHONE ENCOUNTER
Patient calling ( name and  of patient verified )  states has cyst bleeding her  in brain , has referral for Endocrinologist  from Dr Oscar ( and neuro agrees ) and cannot get an appointment with Endocrinologist until July      Dr. Oscar -- Patient asking for assist with obtaining Endocrinology appointment     Best call back number: Sidra at [phone]

## 2024-05-07 ENCOUNTER — TELEPHONE (OUTPATIENT)
Dept: ENDOCRINOLOGY CLINIC | Facility: CLINIC | Age: 39
End: 2024-05-07

## 2024-05-07 NOTE — TELEPHONE ENCOUNTER
----- Message from Farzana Oscar DO sent at 5/6/2024  5:37 PM CDT -----  Regarding: Evaluation for Rathke's cleft cyst  Leonel Moraes,    This patient of mine saw Dr. Abarca at [facility] (neurology) recently and she confirmed she has a Rathke's cleft cyst that is likely causing many of her symptoms. She wanted her to see an endocrinologist as soon as possible, but I know you are booking out for months. Any chance you can see her any sooner?    Thank you!    Sergio

## 2024-05-07 NOTE — TELEPHONE ENCOUNTER
----- Message from Farzana Oscar DO sent at 5/6/2024  5:37 PM CDT -----  Regarding: Evaluation for Rathke's cleft cyst  Leonel Moraes,    This patient of mine saw Dr. Abarca at [facility] (neurology) recently and she confirmed she has a Rathke's cleft cyst that is likely causing many of her symptoms. She wanted her to see an endocrinologist as soon as possible, but I know you are booking out for months. Any chance you can see her any sooner?    Thank you!    Sergio

## 2024-05-07 NOTE — TELEPHONE ENCOUNTER
Left message on voicemail that Dr Oscar did reach out to endocrinology about a sooner appointment. To call endocrinology to get her scheduled.     See telephone encounter from today under endocrinology.

## 2024-05-09 NOTE — TELEPHONE ENCOUNTER
Future Appointments   Date Time Provider Department Center   5/20/2024 12:15 PM [facility]

## 2024-05-20 ENCOUNTER — OFFICE VISIT (OUTPATIENT)
Dept: ENDOCRINOLOGY CLINIC | Facility: CLINIC | Age: 39
End: 2024-05-20

## 2024-05-20 VITALS
DIASTOLIC BLOOD PRESSURE: 68 MMHG | WEIGHT: 187.63 LBS | HEART RATE: 71 BPM | SYSTOLIC BLOOD PRESSURE: 120 MMHG | BODY MASS INDEX: 31.26 KG/M2 | HEIGHT: 65 IN

## 2024-05-20 DIAGNOSIS — E23.7 PITUITARY LESION (HCC): Primary | ICD-10-CM

## 2024-05-20 NOTE — H&P
New Patient Evaluation - History and Physical    CONSULT - Reason for Visit:  Rathke's cyst  Requesting Physician: Dr. Oscar    CHIEF COMPLAINT:    Rathke's cyst     HISTORY OF PRESENT ILLNESS:   Sidra Laird is a 38 year old female who presents for evaluation of rathke's cyst      MRI pituitary :   Ovoid 7 mm intrinsically T1 bright and likely nonenhancing left sellar lesion.  This is favored to represent a complex (proteinaceous/hemorrhagic) Rathke's cleft cyst with other etiologies such as a complex pituitary adenoma less favored (particularly    as there are no reported pituitary laboratory abnormalities)       Headaches: denies  Vision changes: reports recent h/o intermittent blurry vision    Discharge from breasts: denies  She had a hysterectomy in 2015   She is sexually active, she has one child    Lightheadedness, weakness, fatigue: Intermittent dizziness   Symptoms of hypothyroidism including fatigue, weight gain, skin/hair changes: + chronic fatigue  Excessive thirst or urination: denies      PAST MEDICAL HISTORY:   Past Medical History:    Allergic rhinitis    Anxiety    Brain cyst    Depression    PCOS (polycystic ovarian syndrome)       PAST SURGICAL HISTORY:   Past Surgical History:   Procedure Laterality Date          Hysterectomy      Other surgical history Bilateral 2015    Essure       CURRENT MEDICATIONS:    Current Outpatient Medications   Medication Sig Dispense Refill    LORazepam 0.5 MG Oral Tab Take 1 tablet (0.5 mg total) by mouth 2 (two) times daily as needed for Anxiety. 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   Tobacco Use    Smoking status: Every Day     Current packs/day: 0.00     Average packs/day: 1 pack/day for 20.0  years (20.0 ttl pk-yrs)     Types: Cigarettes     Start date: 11/22/2000     Last attempt to quit: 11/22/2020     Years since quitting: 3.4    Smokeless tobacco: Never   Vaping Use    Vaping status: Never Used   Substance and Sexual Activity    Alcohol use: Yes     Alcohol/week: 7.0 standard drinks of alcohol     Types: 7 Glasses of wine per week     Comment: 2-3 glasses of wine about 4x/week    Drug use: Not Currently    Sexual activity: Yes     Partners: Male       FAMILY HISTORY:   Family History   Problem Relation Age of Onset    Alcohol and Other Disorders Associated Father     Substance Abuse Father     Skin cancer Mother     Diabetes Mother     Colon Cancer Maternal Grandmother         50    Cancer Maternal Grandmother     Colon Cancer Maternal Grandfather         50    Diabetes Maternal Grandfather     Bipolar Disorder Brother     Schizophrenia Brother        ASSESSMENTS:     REVIEW OF SYSTEMS:  Constitutional: Negative for: weight change, fever, + fatigue, cold/heat intolerance  Eyes: Negative for:  Visual changes, proptosis, blurring  ENT: Negative for:  dysphagia, neck swelling, dysphonia  Respiratory: Negative for:  dyspnea, cough  Cardiovascular: Negative for:  chest pain, palpitations, orthopnea  GI: Negative for:  abdominal pain, nausea, vomiting, diarrhea, constipation, bleeding  Neurology: Negative for: headache, numbness, weakness, + dizziness  Genito-Urinary: Negative for: dysuria, frequency  Psychiatric: Negative for:  depression, anxiety  Hematology/Lymphatics: Negative for: bruising, lower extremity edema  Endocrine: Negative for: polyuria, polydypsia  Skin: Negative for: rash, blister, cellulitis,      PHYSICAL EXAM:   Vitals:    05/20/24 1230   BP: 120/68   Pulse: 71   Weight: 187 lb 9.6 oz (85.1 kg)   Height: 5' 5\" (1.651 m)     BMI: Body mass index is 31.22 kg/m².         General Appearance:  alert, well developed, in no acute distress  Head: Atraumatic  Eyes:  normal conjunctivae, sclera.,  normal sclera and normal pupils  Throat/Neck: normal sound to voice. Normal hearing, normal speech  Respiratory:  Speaking in full sentences, non-labored. no increased work of breathing, no audible wheezing    Neuro: motor grossly intact, moving all extremities without difficulty  Psychiatric:  oriented to time, self, and place  Extremities: no obvious extremity swelling, no lesions        DATA:     Pertinent data reviewed      ASSESSMENT AND PLAN:    Patient is a 38 year old female with a pituitary lesion that is likely a rathke's cyst    Will get hormonal RICH , fasting and between 7-8 am , as below    For patients with lesions between 5 to 9 mm in diameter, MRI is done yearly for two years; if the lesion is stable in this period, the frequency can be decreased to every few years and then less often.     Further management will be based on results    Orders Placed This Encounter   Procedures    ACTH, Plasma    Cortisol    Prolactin    Estradiol    LH (Luteinizing Hormone)    FSH    IGF-1    TSH W Reflex To Free T4         5/20/2024  Aleisha Downey MD        Patient verbalized a complete  understanding of all of the above and did not have any further questions.

## 2024-08-16 ENCOUNTER — LAB ENCOUNTER (OUTPATIENT)
Dept: LAB | Age: 39
End: 2024-08-16
Attending: INTERNAL MEDICINE
Payer: COMMERCIAL

## 2024-08-16 ENCOUNTER — OFFICE VISIT (OUTPATIENT)
Dept: RHEUMATOLOGY | Facility: CLINIC | Age: 39
End: 2024-08-16

## 2024-08-16 VITALS — HEIGHT: 65 IN | BODY MASS INDEX: 31.32 KG/M2 | WEIGHT: 188 LBS

## 2024-08-16 DIAGNOSIS — G62.9 NEUROPATHY: Primary | ICD-10-CM

## 2024-08-16 DIAGNOSIS — M25.50 POLYARTHRALGIA: ICD-10-CM

## 2024-08-16 DIAGNOSIS — G62.9 NEUROPATHY: ICD-10-CM

## 2024-08-16 DIAGNOSIS — R90.82 WHITE MATTER ABNORMALITY ON MRI OF BRAIN: ICD-10-CM

## 2024-08-16 DIAGNOSIS — M25.532 LEFT WRIST PAIN: ICD-10-CM

## 2024-08-16 DIAGNOSIS — R91.1 PULMONARY NODULE: ICD-10-CM

## 2024-08-16 LAB
C3 SERPL-MCNC: 114.1 MG/DL (ref 90–170)
C4 SERPL-MCNC: 32.3 MG/DL (ref 12–36)
RHEUMATOID FACT SERPL-ACNC: 4.6 IU/ML (ref ?–14)

## 2024-08-16 PROCEDURE — 86431 RHEUMATOID FACTOR QUANT: CPT | Performed by: INTERNAL MEDICINE

## 2024-08-16 PROCEDURE — 36415 COLL VENOUS BLD VENIPUNCTURE: CPT | Performed by: INTERNAL MEDICINE

## 2024-08-16 PROCEDURE — 86038 ANTINUCLEAR ANTIBODIES: CPT | Performed by: INTERNAL MEDICINE

## 2024-08-16 PROCEDURE — 99205 OFFICE O/P NEW HI 60 MIN: CPT | Performed by: INTERNAL MEDICINE

## 2024-08-16 PROCEDURE — 86225 DNA ANTIBODY NATIVE: CPT | Performed by: INTERNAL MEDICINE

## 2024-08-16 PROCEDURE — 3008F BODY MASS INDEX DOCD: CPT | Performed by: INTERNAL MEDICINE

## 2024-08-16 PROCEDURE — 86235 NUCLEAR ANTIGEN ANTIBODY: CPT | Performed by: INTERNAL MEDICINE

## 2024-08-16 PROCEDURE — 86160 COMPLEMENT ANTIGEN: CPT

## 2024-08-16 PROCEDURE — 86039 ANTINUCLEAR ANTIBODIES (ANA): CPT | Performed by: INTERNAL MEDICINE

## 2024-08-16 PROCEDURE — 20605 DRAIN/INJ JOINT/BURSA W/O US: CPT | Performed by: INTERNAL MEDICINE

## 2024-08-16 RX ORDER — BUPROPION HYDROCHLORIDE 300 MG/1
300 TABLET ORAL DAILY
COMMUNITY

## 2024-08-16 RX ORDER — TRIAMCINOLONE ACETONIDE 40 MG/ML
20 INJECTION, SUSPENSION INTRA-ARTICULAR; INTRAMUSCULAR ONCE
Status: COMPLETED | OUTPATIENT
Start: 2024-08-16 | End: 2024-08-16

## 2024-08-16 RX ORDER — ACETAMINOPHEN 160 MG
50 TABLET,DISINTEGRATING ORAL DAILY
COMMUNITY

## 2024-08-16 NOTE — PROGRESS NOTES
RHEUMATOLOGY CLINIC- NEW PATIENT    Sidra Laird is a 39 year old female.    ASSESSMENT/PLAN:       ICD-10-CM    1. Neuropathy  G62.9 MRI Hand Joint Left WO Contrast (89672) [[id number]]     Anti-Nuclear Antibody (IMAN) by IFA, Reflex Titer + Specific Antibodies     Complement C3, Serum     Complement C4, Serum     Rheumatoid Arthritis Factor      2. Pulmonary nodule  R91.1 Anti-Nuclear Antibody (IMAN) by IFA, Reflex Titer + Specific Antibodies     Complement C3, Serum     Complement C4, Serum     Rheumatoid Arthritis Factor      3. White matter abnormality on MRI of brain  R90.82 Anti-Nuclear Antibody (IMAN) by IFA, Reflex Titer + Specific Antibodies     Complement C3, Serum     Complement C4, Serum     Rheumatoid Arthritis Factor      4. Polyarthralgia  M25.50 MRI Hand Joint Left WO Contrast (92757) [[id number]]     Anti-Nuclear Antibody (IMAN) by IFA, Reflex Titer + Specific Antibodies     Complement C3, Serum     Complement C4, Serum     Rheumatoid Arthritis Factor     triamcinolone acetonide (Kenalog-40) 40 MG/ML injection 20 mg     DRAIN/INJECT MEDIUM JOINT/BURSA      5. Left wrist pain  M25.532 triamcinolone acetonide (Kenalog-40) 40 MG/ML injection 20 mg     DRAIN/INJECT MEDIUM JOINT/BURSA        DISCUSSION:  Patient presents as a new outpatient referral from endocrinology and neurology with prior MRI showing nonspecific white matter lesions.  Patient was told in the past she had \"bands \"on CSF analysis.  On review of prior rheumatologic workup, she has had several negative IMAN's as well as CCP, ANCA and normal inflammatory markers.  Will update testing via immunofluorescence, as above.  Additionally, patient reports left wrist pain and was told there was a \"cyst \"in the area.  After discussion of her/benefits, we decided to proceed with a left wrist CSI with postprocedural instructions discussed.    PLAN:  -Left wrist CSI performed in office today (procedure note below).  No complications noted.  - Will check  left hand/wrist MRI for further evaluation.  If symptoms persist, could consider referral to hand Ortho.  - Patient to obtain the above nonfasting lab work  - Consult/evaluation communicated with referring physician/provider.    PROCEDURE: Left Wrist Injection  INDICATION: Left Wrist Pain    Procedure:After sterile prep with Povidone-Iodine and alcohol and following ethyl chloride spray, the wrist was injected under sterile technique  with a mixture 0.5 ml Triamcinolone 40 mg and Xylocaine 1% x 0.5 mL.  Needle was then removed and hemostasis aceived with pressure and a dressing applied.  The patient tolerated the procedure without discomfort    The patient was advised to apply ice to the site thirty minutes per hour for the next 3 hours and limit heavy use for the remainder of the day.  She was instructed to call the office if she has swelling,bleeding, bruising or drainage, fever or chills.    I will MyChart patient on receipt of above results.    Mustapha Holley,   8/16/2024   1:33 PM    HPI:     8/16/2024 Initial Consult: I had the pleasure of seeing Sidra Laird on 8/16/2024 as a new outpatient consultation for rheumatologic workup. The patient was originally referred by neurology/endocrinology in.     39 year old female w/ PMH HLD, PCOS, seasonal allergies who presents to clinic today.Of note, patient has been evaluated by endocrinology, Dr. 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  She was also seen by [facility] (Dr. Mariela Abarca) on 5/3/24 with asymmetric pupil found on exam and given the demyelinating lesion on MRI, there was concern of possible optic neuritis and symptomatic multiple sclerosis.  Recommended additional imaging.  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  Per patient, was ultimately told her presentation was not consistent with with MS based on updated imaging.    Patient reporting multiple  symptoms including polyarthralgias especially affecting her hands, worse with pressure such as yoga exercises and this makes it hard to open bottles.  Arthralgias worse with activity.  Also with intermittent visual abnormalities, brain fog, fatigue, neuropathy.  Her and her fiancé developed a similar itchy rash after walking the wounds, suspected 2/2 mites given recent cicada surge.  She feels worse in heat and notes occasional redness/spinous along her arms in the sun for her whole life.  ROS otherwise negative for measured fever, unintentional weight loss, serositis, uveitis/iritis, Raynaud's phenomenon.  No family history of gout, lupus, rheumatoid arthritis, psoriasis.    Current Medications:  N/A    Medication History:  N/A    Interval History:   See Above    HISTORY:  Past Medical History:    Allergic rhinitis    Anxiety    Brain cyst    Depression    PCOS (polycystic ovarian syndrome)      Social Hx Reviewed   Family Hx Reviewed     Medications (Active prior to today's visit):  Current Outpatient Medications   Medication Sig Dispense Refill    buPROPion  MG Oral Tablet 24 Hr Take 1 tablet (300 mg total) by mouth daily.      cholecalciferol 50 MCG (2000 UT) Oral Cap Take 50 mcg by mouth daily.      LORazepam 0.5 MG Oral Tab Take 1 tablet (0.5 mg total) by mouth 2 (two) times daily as needed for Anxiety. 20 tablet 0       Allergies:  Allergies   Allergen Reactions    Seasonal     Grass Runny nose    Ragweed Runny nose         ROS:   Review of Systems   Constitutional:  Positive for chills and fatigue. Negative for fever.   HENT:  Negative for congestion, hearing loss, mouth sores, nosebleeds and trouble swallowing.    Eyes:  Positive for visual disturbance. Negative for photophobia, pain and redness.   Respiratory:  Negative for cough and shortness of breath.    Cardiovascular:  Negative for chest pain, palpitations and leg swelling.   Gastrointestinal:  Negative for abdominal pain, blood in stool,  diarrhea and nausea.   Endocrine: Negative for cold intolerance and heat intolerance.   Genitourinary:  Negative for dysuria, frequency and hematuria.   Musculoskeletal:  Positive for arthralgias. Negative for back pain, gait problem, joint swelling, myalgias, neck pain and neck stiffness.   Skin:  Negative for color change and rash.   Neurological:  Positive for dizziness, weakness and numbness. Negative for headaches.   Psychiatric/Behavioral:  Negative for confusion and dysphoric mood.         PHYSICAL EXAM:     Constitutional:  Well developed, Well nourished, No acute distress  HENT:  Normocephalic, Atraumatic, Bilateral external ears normal, Oropharynx moist, No oral exudates.  Neck: Normal range of motion, No tenderness, Supple, No stridor.  Eyes:  PERRL, EOMI, Conjunctiva normal, No discharge.  Respiratory:  Normal breath sounds, No respiratory distress, No wheezing.  Cardiovascular:  Normal heart rate, Normal rhythm, No murmurs, No rubs, No gallops.  GI:  Bowel sounds normal, Soft, No tenderness, No masses, No pulsatile masses.  : No CVA tenderness.  Musculoskeletal:  A comprehensive 28 count joint exam was done and was negative for swelling or tenderness except as noted. Inspections for misalignment, asymmetry, crepitation, defects, tenderness, masses, nodules, effusions, range of motion, and stability in the upper and lower extremities bilaterally are all normal unless noted.           Integument:  Warm, Dry, No erythema, No rash.  Lymphatic:  No lymphadenopathy noted.  Neurologic:  Alert & oriented x 3, Normal motor function, Normal sensory function, No focal deficits noted.  Psychiatric:  Affect normal, Judgment normal, Mood normal.    LABS:     Prior lab work reviewed and notable for:     2/14/2024:  CMP with BUN 6, CR 0.69, LFTs not elevated, no gamma gap noted  CBC with normal WBC, Hg 16.1 borderline high,     3/10/2022:  CRP less than 3 WNL, CK 60 WNL  Electrophoresis without apparent  monoclonal protein    2/10/2022:  CTD cascade negative, negative CCP less than 15.6, IMAN less than 1: 80  ESR 2 WNL    2021:  Aquaporin 4 less than 1.5 WNL    1/15/2021:  SPEP without significant abnormalities  Autoimmune neurology reflexive panel negative  ANCA panel negative  Paraneoplastic autoantibodies negative  IMAN screen negative      Imagin/29/24 MRI Brain:   Impression   CONCLUSION:  Mild-to-moderate burden of demyelinating lesions in the supratentorial periventricular white matter, mild to moderately increased since the comparison  brain MRI.     No enhancing lesions to suggest active demyelination.     Solitary black hole type lesion within the left frontal lobe.     8 mm left pituitary lesion.  See the MRI of the brain from 2024 regarding pituitary findings.       21 MRI Cervical/Thoracic Spine:        Cervical Impression   CONCLUSION:  1. Normal cervical and upper thoracic cord through T3.  2. C6-7:  Unchanged degenerative disc disease with small central/paracentral disc protrusion.  Mild central narrowing.  3. Other stable minor degenerative changes in the cervical spine.       Thoracic Impression   CONCLUSION:  1. Normal thoracic cord.  2. Multilevel degenerative disc disease.  Small disc herniations at T7-8 and T8-9 associated with mild central narrowing.     21 PET:   Impression   CONCLUSION:  There is an 8 mm diameter pulmonary nodule in the left upper lobe.  The nodule is not significantly FDG avid which may be due to its small size or lack of metabolic activity.  Other nodules demonstrated in the lungs on prior CT imaging are not clearly  identified on this exam.  Recommend conventional CT surveillance of this finding with next follow-up CT hand 6-12 months.     21 HRCT:   Impression   CONCLUSION:     Subcentimeter cystic lesions within the bilateral upper lobes are nonspecific but can be seen with Langerhans cell histiocytosis given history of smoking.   Other causes of cystic lung disease such as lymphangioleiomyomatosis or emphysema are felt to be  less likely given location and size of the cystic lesions.     Subcentimeter pulmonary nodules within the bilateral upper and lower lobes measuring up to 0.7 cm within the left upper lobe. Followup chest CT recommended in 6 months to demonstrate resolution.       Please note there were no prior images available for comparison. If additional prior imaging becomes available, an addendum will be made at that point.     No honeycombing or bronchiectasis.

## 2024-08-19 LAB — NUCLEAR IGG TITR SER IF: POSITIVE {TITER}

## 2024-08-20 LAB
ANA NUCLEOLAR TITR SER IF: 80 {TITER}
CENTROMERE IGG SER-ACNC: <0.4 U/ML
DSDNA AB TITR SER: <10 {TITER}
ENA JO1 AB SER IA-ACNC: <0.3 U/ML
ENA RNP IGG SER IA-ACNC: 1.9 U/ML
ENA SCL70 IGG SER IA-ACNC: <0.6 U/ML
ENA SM IGG SER IA-ACNC: <0.7 U/ML
ENA SS-A IGG SER IA-ACNC: <0.4 U/ML
ENA SS-B IGG SER IA-ACNC: <0.4 U/ML
U1 SNRNP IGG SER IA-ACNC: 1.4 U/ML

## 2024-09-16 ENCOUNTER — MED REC SCAN ONLY (OUTPATIENT)
Facility: CLINIC | Age: 39
End: 2024-09-16

## 2024-09-22 ENCOUNTER — HOSPITAL ENCOUNTER (OUTPATIENT)
Dept: MRI IMAGING | Facility: HOSPITAL | Age: 39
End: 2024-09-22
Attending: INTERNAL MEDICINE
Payer: COMMERCIAL

## 2024-09-22 ENCOUNTER — HOSPITAL ENCOUNTER (OUTPATIENT)
Dept: MRI IMAGING | Facility: HOSPITAL | Age: 39
Discharge: HOME | End: 2024-09-22
Attending: INTERNAL MEDICINE
Payer: COMMERCIAL

## 2024-09-22 DIAGNOSIS — G62.9 NEUROPATHY: ICD-10-CM

## 2024-09-22 DIAGNOSIS — M25.50 POLYARTHRALGIA: ICD-10-CM

## 2024-09-22 PROCEDURE — 73221 MRI JOINT UPR EXTREM W/O DYE: CPT | Performed by: INTERNAL MEDICINE

## 2024-09-23 DIAGNOSIS — F41.9 ANXIETY: ICD-10-CM

## 2024-09-30 RX ORDER — LORAZEPAM 0.5 MG/1
0.5 TABLET ORAL 2 TIMES DAILY PRN
Qty: 20 TABLET | Refills: 0 | Status: SHIPPED | OUTPATIENT
Start: 2024-09-30

## 2024-09-30 NOTE — TELEPHONE ENCOUNTER
Please review; protocol failed    Future Appointments   Date Time Provider Department Center   11/1/2024 12:00 PM Farzana Oscar DO [facility]     Last office visit: 2/14/2024    Recent fill dates: No dispense history in the last 6 months.  Date of  last written prescription:      Date: 2/14/24 (For qty of: #20 each for a 10 day supply as needed with 0 refills)       Requested Prescriptions   Pending Prescriptions Disp Refills    LORAZEPAM 0.5 MG Oral Tab [Pharmacy Med Name: LORAZEPAM 0.5MG TABLETS] 20 tablet 0     Sig: TAKE 1 TABLET(0.5 MG) BY MOUTH TWICE DAILY AS NEEDED FOR ANXIETY       Controlled Substance Medication Failed - 9/23/2024  5:07 PM        Failed - This medication is a controlled substance - forward to provider to refill             Future Appointments         Provider Department Appt Notes    In 1 month Farzana Oscar DO [facility] Neuro/cardio problems.          Recent Outpatient Visits              1 month ago Neuropathy    [facility] [place], [place], DO    Office Visit    4 months ago Pituitary lesion (HCC)    [facility] Aleisha Downey MD    Office Visit    7 months ago Encounter for routine adult health examination without abnormal findings    [facility] Farzana Oscar DO    Office Visit    1 year ago Encounter for weight management    [facility] Farzana Oscar DO    Office Visit    2 years ago Encounter for routine adult health examination without abnormal findings    [facility] Farzana Oscar DO    Office Visit

## 2024-10-03 ENCOUNTER — TELEPHONE (OUTPATIENT)
Dept: RHEUMATOLOGY | Facility: CLINIC | Age: 39
End: 2024-10-03

## 2024-10-03 NOTE — TELEPHONE ENCOUNTER
Received fax that MRI was denied for CPT 04158. Looks like MRI was originally approved under another CPT code but then changed day of exam.     Managed care please assist

## 2024-10-03 NOTE — TELEPHONE ENCOUNTER
Spoke with the peer to peer line with physician reviewer 10/3 during the evening.  Clarified that x-rays were done in the past and patient failed more conservative management.   Authorization number: [id number]

## 2024-10-03 NOTE — TELEPHONE ENCOUNTER
Discussed with provider. Unsure why CPT 79687 was approved but 85352 is denied. She will complete a P2P with a physician reviewer at [facility]. There are prompts (1, 1, 2) to connect directly with a physician reviewer.

## 2024-10-03 NOTE — TELEPHONE ENCOUNTER
States denied because \"this test is needed when after you have tried other treatments for 6 weeks that did not work.\"    Appeal options are P2P via [employer] or attached appeal form with letter of medical necessity. Will place denial on providers desk to review.     Dr. Holley please advise.

## 2024-11-01 ENCOUNTER — TELEPHONE (OUTPATIENT)
Facility: CLINIC | Age: 39
End: 2024-11-01

## 2024-11-01 ENCOUNTER — OFFICE VISIT (OUTPATIENT)
Facility: CLINIC | Age: 39
End: 2024-11-01
Payer: COMMERCIAL

## 2024-11-01 VITALS
WEIGHT: 196 LBS | BODY MASS INDEX: 32.65 KG/M2 | DIASTOLIC BLOOD PRESSURE: 82 MMHG | SYSTOLIC BLOOD PRESSURE: 122 MMHG | HEIGHT: 65 IN | HEART RATE: 68 BPM | OXYGEN SATURATION: 97 %

## 2024-11-01 DIAGNOSIS — E23.7 PITUITARY LESION (HCC): ICD-10-CM

## 2024-11-01 DIAGNOSIS — E66.811 OBESITY (BMI 30.0-34.9): Primary | ICD-10-CM

## 2024-11-01 DIAGNOSIS — F32.9 CURRENT EPISODE OF MAJOR DEPRESSIVE DISORDER WITHOUT PRIOR EPISODE, UNSPECIFIED DEPRESSION EPISODE SEVERITY: ICD-10-CM

## 2024-11-01 DIAGNOSIS — R53.83 FATIGUE, UNSPECIFIED TYPE: ICD-10-CM

## 2024-11-01 DIAGNOSIS — E78.2 MIXED HYPERLIPIDEMIA: ICD-10-CM

## 2024-11-01 DIAGNOSIS — E55.9 VITAMIN D DEFICIENCY: ICD-10-CM

## 2024-11-01 PROCEDURE — 3008F BODY MASS INDEX DOCD: CPT | Performed by: FAMILY MEDICINE

## 2024-11-01 PROCEDURE — 3074F SYST BP LT 130 MM HG: CPT | Performed by: FAMILY MEDICINE

## 2024-11-01 PROCEDURE — 99214 OFFICE O/P EST MOD 30 MIN: CPT | Performed by: FAMILY MEDICINE

## 2024-11-01 PROCEDURE — 3079F DIAST BP 80-89 MM HG: CPT | Performed by: FAMILY MEDICINE

## 2024-11-01 RX ORDER — BUPROPION HYDROCHLORIDE 150 MG/1
150 TABLET ORAL DAILY
COMMUNITY
Start: 2024-11-01

## 2024-11-01 RX ORDER — TIRZEPATIDE 2.5 MG/.5ML
2.5 INJECTION, SOLUTION SUBCUTANEOUS WEEKLY
Qty: 2 ML | Refills: 0 | Status: SHIPPED | OUTPATIENT
Start: 2024-11-01

## 2024-11-01 NOTE — PROGRESS NOTES
CC:    Chief Complaint   Patient presents with    Test Results     Pt. Would like to discuss lab results, that she has done with various doctors out of [place].     Weight Loss     Pt. Would like to discuss weight loss management, and would like to discuss options to quit smoking.        HPI: 39 year old female here to discuss weight concerns and recent lab results.  Found a new neurologist in private practice, and she is referring her to an MS specialist to get confirmation of the diagnosis.   Also seeing a neurosurgeon for idiopathic intracranial hypertension and a pituitary lesion.  He is recommending surgery for the pituitary lesion as it may be contributing to her dizziness.   Wants to quit smoking, but has not had success with quitting smoking with Bupropion.  It has, however, helped her depression in the past.  Takes 150 mg in the morning, but it has caused her to have insomnia.   Feels every time she quits smoking she gets more brain fog, fatigue, neck pain, and head tingling.  Feels she is having more cognitive decline.  She was able to lose weight in July from eating healthy, but after quitting smoking she gained a lot of weight.  She follows a vegan diet and she is eating out less.   She does have a sweet tooth and drinks too much alcohol at times.     ROS:  General:  No fever, no fatigue, weight gain   HEENT:  Denies congestion or nasal discharge  Cardio:  No chest pain  Pulmonary:  No cough, no SOB  GI:  No N/V/D  :  No discharge, no dysuria, no polyuria, no hematuria  Dermatologic:  No rashes  Psych: depression     Past Medical History:    Allergic rhinitis    Anxiety    Brain cyst    Depression    PCOS (polycystic ovarian syndrome)       Social History     Socioeconomic History    Marital status: Single     Spouse name: Not on file    Number of children: Not on file    Years of education: Not on file    Highest education level: Not on file   Occupational History    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   Tobacco Use    Smoking status: Every Day     Current packs/day: 1.00     Average packs/day: 1 pack/day for 23.6 years (23.6 ttl pk-yrs)     Types: Cigarettes     Start date: 11/22/2000     Last attempt to quit: 11/22/2020    Smokeless tobacco: Never   Vaping Use    Vaping status: Never Used   Substance and Sexual Activity    Alcohol use: Yes     Alcohol/week: 7.0 standard drinks of alcohol     Types: 7 Glasses of wine per week     Comment: 2-3 glasses of wine about 4x/week    Drug use: Yes     Comment: cannabis    Sexual activity: Yes     Partners: Male   Other Topics Concern    Caffeine Concern Not Asked    Exercise Not Asked    Seat Belt Not Asked    Special Diet Not Asked    Stress Concern Not Asked    Weight Concern Not Asked   Social History Narrative    No h/o of abuse        Lives with son      Social Drivers of Health     Financial Resource Strain: High Risk (1/9/2022)    [facility], [facility]    Overall Financial Resource Strain (CARDIA)     Difficulty of Paying Living Expenses: Hard   Food Insecurity: No Food Insecurity (9/11/2024)    Received from [facility]    Food Insecurity     Currently or in the past 3 months, have you worried your food would run out before you had money to buy more?: No     In the past 12 months, have you run out of food or been unable to get more?: No   Transportation Needs: No Transportation Needs (9/11/2024)    Received from [facility]    Transportation Needs     Currently or in the past 3 months, has lack of transportation kept you from medical appointments, getting food or medicine, or providing care to a family member?: Not on file     : Not on file     Medical Transportation Needs?: No     Daily Living Transportation Needs? [Peds Only] : Not on file   Physical Activity: Insufficiently Active (1/9/2022)    Received from [facility], [facility]    Exercise Vital Sign     Days of Exercise per  Week: 3 days     Minutes of Exercise per Session: 30 min   Stress: Stress Concern Present (1/9/2022)    Received from [facility]    [employer] [place] [employer] - Occupational Stress Questionnaire     Feeling of Stress : To some extent   Social Connections: Socially Isolated (1/9/2022)    Received from [facility]    Social Connection and Isolation Panel [NHANES]     Frequency of Communication with Friends and Family: Once a week     Frequency of Social Gatherings with Friends and Family: Never     Attends [employer] Services: Never     Active Member of Clubs or Organizations: No     Attends Club or Organization Meetings: Never     Marital Status: Living with partner   Housing Stability: Low Risk  (1/9/2022)    [facility]    Housing Stability Vital Sign     Unable to Pay for Housing in the Last Year: No     Number of Places Lived in the Last Year: 2     Unstable Housing in the Last Year: No       Current Outpatient Medications   Medication Sig Dispense Refill    buPROPion  MG Oral Tablet 24 Hr Take 1 tablet (150 mg total) by mouth daily.      LORazepam 0.5 MG Oral Tab Take 1 tablet (0.5 mg total) by mouth 2 (two) times daily as needed. 20 tablet 0    cholecalciferol 50 MCG (2000 UT) Oral Cap Take 50 mcg by mouth daily.         Seasonal, Grass, and Ragweed      Vitals:   Vitals:    11/01/24 1201   BP: 122/82   Pulse: 68   SpO2: 97%   Weight: 196 lb (88.9 kg)   Height: 5' 5\" (1.651 m)       Body mass index is 32.62 kg/m².    Physical:  General:  Alert, appropriate, no acute distress   Psych: normal mood and affect    Assessment and Plan: 39-year-old female here to discuss weight management options, depression, and pituitary lesion.    1. Obesity (BMI 30.0-34.9)    -Given persistent weight gain when she tries to quit smoking despite eating well and exercising will trial Zepbound 2.5 mg weekly and increase monthly as needed and tolerated  - This  medication will also hopefully help her quit smoking and decrease her drinking  -No personal history of pancreatitis and no personal or family history of medullary thyroid cancer or MEN syndrome Type 2.   - Tirzepatide-Weight Management (ZEPBOUND) 2.5 MG/0.5ML Subcutaneous Solution Auto-injector; Inject 2.5 mg into the skin once a week.  Dispense: 2 mL; Refill: 0    2. Mixed hyperlipidemia    - Plan Zepbound as above and continued diet and exercise improvements  - Tirzepatide-Weight Management (ZEPBOUND) 2.5 MG/0.5ML Subcutaneous Solution Auto-injector; Inject 2.5 mg into the skin once a week.  Dispense: 2 mL; Refill: 0  - Comp Metabolic Panel (14) [E]; Future    3. Current episode of major depressive disorder without prior episode, unspecified depression episode severity    -Will continue Wellbutrin  mg daily, but if symptoms of insomnia persist we will trial alternative medication    4. Pituitary lesion (HCC)    -Check labs as requested by her neurosurgeon  - TSH and Free T4 [E]; Future  - Cortisol [E]; Future  - ACTH, Plasma [E]; Future  - Prolactin [E]; Future  - LH (Luteinizing Hormone) [E]; Future  - FSH [E]; Future  - Growth Hormone, Serum [E]; Future  - Estradiol [E]; Future    5. Vitamin D deficiency    - Vitamin D [E]; Future    6. Fatigue, unspecified type    - CBC W Differential W Platelet [E]; Future  - Iron And Tibc [E]; Future  - Ferritin [E]; Future  - Vitamin B12 [E]; Future  - Folic Acid Serum [E]; Future      Farzana Oscar DO  11/01/24  12:13 PM

## 2024-11-04 NOTE — TELEPHONE ENCOUNTER
Please answer question and route back to triage support    Has the patient been on and had an inadequate response to a weight loss regimen of a low-calorie diet, increased physical activity, and behavioral modifications for a minimum of 6 months prior to initiating therapy?

## 2024-12-04 ENCOUNTER — LAB ENCOUNTER (OUTPATIENT)
Dept: LAB | Age: 39
End: 2024-12-04
Attending: FAMILY MEDICINE
Payer: COMMERCIAL

## 2024-12-04 DIAGNOSIS — E78.2 MIXED HYPERLIPIDEMIA: ICD-10-CM

## 2024-12-04 DIAGNOSIS — E23.7 PITUITARY LESION (HCC): ICD-10-CM

## 2024-12-04 DIAGNOSIS — E55.9 VITAMIN D DEFICIENCY: ICD-10-CM

## 2024-12-04 DIAGNOSIS — R53.83 FATIGUE, UNSPECIFIED TYPE: ICD-10-CM

## 2024-12-04 LAB
ALBUMIN SERPL-MCNC: 4.6 G/DL (ref 3.2–4.8)
ALBUMIN/GLOB SERPL: 1.8 {RATIO} (ref 1–2)
ALP LIVER SERPL-CCNC: 58 U/L
ALT SERPL-CCNC: 23 U/L
ANION GAP SERPL CALC-SCNC: 6 MMOL/L (ref 0–18)
AST SERPL-CCNC: 18 U/L (ref ?–34)
BASOPHILS # BLD AUTO: 0.07 X10(3) UL (ref 0–0.2)
BASOPHILS NFR BLD AUTO: 1 %
BILIRUB SERPL-MCNC: 0.3 MG/DL (ref 0.3–1.2)
BUN BLD-MCNC: 7 MG/DL (ref 9–23)
BUN/CREAT SERPL: 9.9 (ref 10–20)
CALCIUM BLD-MCNC: 9.8 MG/DL (ref 8.7–10.4)
CHLORIDE SERPL-SCNC: 110 MMOL/L (ref 98–112)
CO2 SERPL-SCNC: 24 MMOL/L (ref 21–32)
CORTIS SERPL-MCNC: 11.9 UG/DL
CREAT BLD-MCNC: 0.71 MG/DL
DEPRECATED HBV CORE AB SER IA-ACNC: 53 NG/ML
DEPRECATED RDW RBC AUTO: 42.2 FL (ref 35.1–46.3)
EGFRCR SERPLBLD CKD-EPI 2021: 110 ML/MIN/1.73M2 (ref 60–?)
EOSINOPHIL # BLD AUTO: 0.21 X10(3) UL (ref 0–0.7)
EOSINOPHIL NFR BLD AUTO: 2.9 %
ERYTHROCYTE [DISTWIDTH] IN BLOOD BY AUTOMATED COUNT: 11.7 % (ref 11–15)
ESTRADIOL SERPL-MCNC: 28.8 PG/ML
FASTING STATUS PATIENT QL REPORTED: YES
FOLATE SERPL-MCNC: 24.8 NG/ML (ref 5.4–?)
FSH SERPL-ACNC: 57.9 MIU/ML
GLOBULIN PLAS-MCNC: 2.5 G/DL (ref 2–3.5)
GLUCOSE BLD-MCNC: 95 MG/DL (ref 70–99)
HCT VFR BLD AUTO: 45.8 %
HGB BLD-MCNC: 15.7 G/DL
IMM GRANULOCYTES # BLD AUTO: 0.01 X10(3) UL (ref 0–1)
IMM GRANULOCYTES NFR BLD: 0.1 %
IRON SATN MFR SERPL: 27 %
IRON SERPL-MCNC: 83 UG/DL
LH SERPL-ACNC: 45.2 MIU/ML
LYMPHOCYTES # BLD AUTO: 2.51 X10(3) UL (ref 1–4)
LYMPHOCYTES NFR BLD AUTO: 34.1 %
MCH RBC QN AUTO: 33.2 PG (ref 26–34)
MCHC RBC AUTO-ENTMCNC: 34.3 G/DL (ref 31–37)
MCV RBC AUTO: 96.8 FL
MONOCYTES # BLD AUTO: 0.46 X10(3) UL (ref 0.1–1)
MONOCYTES NFR BLD AUTO: 6.3 %
NEUTROPHILS # BLD AUTO: 4.1 X10 (3) UL (ref 1.5–7.7)
NEUTROPHILS # BLD AUTO: 4.1 X10(3) UL (ref 1.5–7.7)
NEUTROPHILS NFR BLD AUTO: 55.6 %
OSMOLALITY SERPL CALC.SUM OF ELEC: 288 MOSM/KG (ref 275–295)
PLATELET # BLD AUTO: 269 10(3)UL (ref 150–450)
POTASSIUM SERPL-SCNC: 4 MMOL/L (ref 3.5–5.1)
PROLACTIN SERPL-MCNC: 9.7 NG/ML
PROT SERPL-MCNC: 7.1 G/DL (ref 5.7–8.2)
RBC # BLD AUTO: 4.73 X10(6)UL
SODIUM SERPL-SCNC: 140 MMOL/L (ref 136–145)
T4 FREE SERPL-MCNC: 1.2 NG/DL (ref 0.8–1.7)
TIBC SERPL-MCNC: 311 UG/DL (ref 250–425)
TRANSFERRIN SERPL-MCNC: 209 MG/DL (ref 250–380)
TSI SER-ACNC: 1.83 UIU/ML (ref 0.55–4.78)
VIT B12 SERPL-MCNC: 684 PG/ML (ref 211–911)
VIT D+METAB SERPL-MCNC: 22.5 NG/ML (ref 30–100)
WBC # BLD AUTO: 7.4 X10(3) UL (ref 4–11)

## 2024-12-04 PROCEDURE — 84439 ASSAY OF FREE THYROXINE: CPT | Performed by: FAMILY MEDICINE

## 2024-12-04 PROCEDURE — 82728 ASSAY OF FERRITIN: CPT | Performed by: FAMILY MEDICINE

## 2024-12-04 PROCEDURE — 82607 VITAMIN B-12: CPT | Performed by: FAMILY MEDICINE

## 2024-12-04 PROCEDURE — 80050 GENERAL HEALTH PANEL: CPT | Performed by: FAMILY MEDICINE

## 2024-12-04 PROCEDURE — 83003 ASSAY GROWTH HORMONE (HGH): CPT | Performed by: FAMILY MEDICINE

## 2024-12-04 PROCEDURE — 83002 ASSAY OF GONADOTROPIN (LH): CPT | Performed by: FAMILY MEDICINE

## 2024-12-04 PROCEDURE — 83540 ASSAY OF IRON: CPT | Performed by: FAMILY MEDICINE

## 2024-12-04 PROCEDURE — 82746 ASSAY OF FOLIC ACID SERUM: CPT | Performed by: FAMILY MEDICINE

## 2024-12-04 PROCEDURE — 82533 TOTAL CORTISOL: CPT | Performed by: FAMILY MEDICINE

## 2024-12-04 PROCEDURE — 82670 ASSAY OF TOTAL ESTRADIOL: CPT | Performed by: FAMILY MEDICINE

## 2024-12-04 PROCEDURE — 82024 ASSAY OF ACTH: CPT | Performed by: FAMILY MEDICINE

## 2024-12-04 PROCEDURE — 82306 VITAMIN D 25 HYDROXY: CPT | Performed by: FAMILY MEDICINE

## 2024-12-04 PROCEDURE — 83001 ASSAY OF GONADOTROPIN (FSH): CPT | Performed by: FAMILY MEDICINE

## 2024-12-04 PROCEDURE — 84466 ASSAY OF TRANSFERRIN: CPT | Performed by: FAMILY MEDICINE

## 2024-12-04 PROCEDURE — 84146 ASSAY OF PROLACTIN: CPT | Performed by: FAMILY MEDICINE

## 2024-12-05 LAB — ACTH: 17.5 PG/ML

## 2024-12-06 DIAGNOSIS — E55.9 VITAMIN D DEFICIENCY: Primary | ICD-10-CM

## 2024-12-06 LAB — GROWTH HORMONE: 0.1 NG/ML

## 2024-12-11 ENCOUNTER — OFFICE VISIT (OUTPATIENT)
Dept: ENDOCRINOLOGY CLINIC | Facility: CLINIC | Age: 39
End: 2024-12-11

## 2024-12-11 ENCOUNTER — LAB ENCOUNTER (OUTPATIENT)
Dept: LAB | Age: 39
End: 2024-12-11
Attending: INTERNAL MEDICINE
Payer: COMMERCIAL

## 2024-12-11 VITALS
DIASTOLIC BLOOD PRESSURE: 76 MMHG | SYSTOLIC BLOOD PRESSURE: 114 MMHG | BODY MASS INDEX: 32.49 KG/M2 | HEIGHT: 65 IN | HEART RATE: 80 BPM | WEIGHT: 195 LBS

## 2024-12-11 DIAGNOSIS — E23.6 RATHKE'S CYST (HCC): ICD-10-CM

## 2024-12-11 DIAGNOSIS — E23.6 RATHKE'S CYST (HCC): Primary | ICD-10-CM

## 2024-12-11 PROCEDURE — 3008F BODY MASS INDEX DOCD: CPT | Performed by: INTERNAL MEDICINE

## 2024-12-11 PROCEDURE — 99213 OFFICE O/P EST LOW 20 MIN: CPT | Performed by: INTERNAL MEDICINE

## 2024-12-11 PROCEDURE — 3074F SYST BP LT 130 MM HG: CPT | Performed by: INTERNAL MEDICINE

## 2024-12-11 PROCEDURE — 36415 COLL VENOUS BLD VENIPUNCTURE: CPT

## 2024-12-11 PROCEDURE — 84305 ASSAY OF SOMATOMEDIN: CPT

## 2024-12-11 PROCEDURE — 3078F DIAST BP <80 MM HG: CPT | Performed by: INTERNAL MEDICINE

## 2024-12-11 NOTE — PROGRESS NOTES
FU VISIT     CHIEF COMPLAINT:    Rathke's cyst     HISTORY OF PRESENT ILLNESS:   Sidra Laird is a 39 year old female who presents for evaluation of rathke's cyst      MRI pituitary :   Ovoid 7 mm intrinsically T1 bright and likely nonenhancing left sellar lesion.  This is favored to represent a complex (proteinaceous/hemorrhagic) Rathke's cleft cyst with other etiologies such as a complex pituitary adenoma less favored (particularly    as there are no reported pituitary laboratory abnormalities)       Headaches: denies  Vision changes: reports recent h/o intermittent dizziness   Discharge from breasts: denies  She had a hysterectomy in    She is sexually active, she has one child    Lightheadedness, weakness, fatigue: Intermittent dizziness   Symptoms of hypothyroidism including fatigue, weight gain, skin/hair changes: + fatigue  Excessive thirst or urination: denies      PAST MEDICAL HISTORY:   Past Medical History:    Allergic rhinitis    Anxiety    Brain cyst    Depression    PCOS (polycystic ovarian syndrome)       PAST SURGICAL HISTORY:   Past Surgical History:   Procedure Laterality Date          Hysterectomy      Other surgical history Bilateral 2015    Essure       CURRENT MEDICATIONS:    Current Outpatient Medications   Medication Sig Dispense Refill    Cholecalciferol (VITAMIN D3) 1.25 MG (03063 UT) Oral Tab Take 1 tablet by mouth once a week for 12 doses. 12 tablet 0    buPROPion  MG Oral Tablet 24 Hr Take 1 tablet (150 mg total) by mouth daily.      Tirzepatide-Weight Management (ZEPBOUND) 2.5 MG/0.5ML Subcutaneous Solution Auto-injector Inject 2.5 mg into the skin once a week. 2 mL 0    LORazepam 0.5 MG Oral Tab Take 1 tablet (0.5 mg total) by mouth 2 (two) times daily as needed. 20 tablet 0    cholecalciferol 50 MCG (2000 UT) Oral Cap Take 50 mcg by mouth daily.         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   Tobacco Use    Smoking status: Every Day     Current packs/day: 1.00     Average packs/day: 1 pack/day for 23.7 years (23.7 ttl pk-yrs)     Types: Cigarettes     Start date: 11/22/2000     Last attempt to quit: 11/22/2020    Smokeless tobacco: Never   Vaping Use    Vaping status: Never Used   Substance and Sexual Activity    Alcohol use: Yes     Alcohol/week: 7.0 standard drinks of alcohol     Types: 7 Glasses of wine per week     Comment: 2-3 glasses of wine about 4x/week    Drug use: Yes     Frequency: 5.0 times per week     Types: Cannabis     Comment: cannabis    Sexual activity: Yes     Partners: Male       FAMILY HISTORY:   Family History   Problem Relation Age of Onset    Alcohol and Other Disorders Associated Father     Substance Abuse Father     Skin cancer Mother     Diabetes Mother     Colon Cancer Maternal Grandmother         50    Cancer Maternal Grandmother     Colon Cancer Maternal Grandfather         50    Diabetes Maternal Grandfather     Bipolar Disorder Brother     Schizophrenia Brother        ASSESSMENTS:     REVIEW OF SYSTEMS:  Constitutional: Negative for: weight change, fever,  + fatigue, cold/heat intolerance  Eyes: Negative for:  Visual changes, proptosis, blurring  ENT: Negative for:  dysphagia, neck swelling, dysphonia  Respiratory: Negative for:  dyspnea, cough  Cardiovascular: Negative for:  chest pain, palpitations, orthopnea  GI: Negative for:  abdominal pain, nausea, vomiting, diarrhea, constipation, bleeding  Neurology: Negative for: headache, numbness, weakness, + intermittent dizziness  Genito-Urinary: Negative for: dysuria, frequency  Psychiatric: Negative for:  depression, anxiety  Hematology/Lymphatics: Negative for: bruising, lower extremity edema  Endocrine: Negative for: polyuria, polydypsia  Skin:  Negative for: rash, blister, cellulitis,      PHYSICAL EXAM:   Vitals:    12/11/24 0940   BP: 114/76   Pulse: 80   Weight: 195 lb (88.5 kg)   Height: 5' 5\" (1.651 m)     BMI: Body mass index is 32.45 kg/m².         General Appearance:  alert, well developed, in no acute distress  Head: Atraumatic  Eyes:  normal conjunctivae, sclera., normal sclera and normal pupils  Throat/Neck: normal sound to voice. Normal hearing, normal speech  Respiratory:  Speaking in full sentences, non-labored. no increased work of breathing, no audible wheezing    Neuro: motor grossly intact, moving all extremities without difficulty  Psychiatric:  oriented to time, self, and place  Extremities: no obvious extremity swelling        DATA:     Pertinent data reviewed      ASSESSMENT AND PLAN:    Patient is a 39 year old female with a pituitary lesion that is likely a rathke's cyst    Reviewed labs  Cortisol normal   TSh normal  Prolactin normal   FSH/ LH high --> indicate menopause she has a hysterectomy with ovary preservation   Will check IGF-1 to complete hormonal RICH     Imaging:   For patients with lesions between 5 to 9 mm in diameter, MRI is done yearly for two years; if the lesion is stable in this period, the frequency can be decreased to every few years and then less often.   Recent MRI April 2024       Discussed to continue to FU with neurology for RICH of dizziness     Orders Placed This Encounter   Procedures    IGF-1           Aleisha Downey MD        Patient verbalized a complete  understanding of all of the above and did not have any further questions.

## 2024-12-13 LAB
IGF I: 135 NG/ML
IGF-1, Z SCORE: -0.5 S.D.

## 2024-12-14 DIAGNOSIS — E78.2 MIXED HYPERLIPIDEMIA: ICD-10-CM

## 2024-12-14 DIAGNOSIS — E66.811 OBESITY (BMI 30.0-34.9): ICD-10-CM

## 2024-12-14 RX ORDER — TIRZEPATIDE 5 MG/.5ML
5 INJECTION, SOLUTION SUBCUTANEOUS WEEKLY
Qty: 2 ML | Refills: 0 | Status: SHIPPED | OUTPATIENT
Start: 2024-12-14

## 2024-12-14 NOTE — TELEPHONE ENCOUNTER
Left message to call back on patient's voicemail. Patient sent message below. Dr. Oscar is out of the office today.            Name:SATURDAY TRIAGE RN                      2024 10:13:06 AM  ProfileId:    [id number]                   [facility]  Department:[place] ANSWERING SERVICE  ======================================================================  Text Messages    Message # [id number]         2024 10:11a   [CRISTALB]  To:  SATURDAY TRIAGE RN  From:  LATOYA Elam MD:  BARBARA  Phone#:  [phone]  ----------------------------------------------------------------------   6/3/84 RE URGENT RX REFILL          (Message Is Not Delivered)  ======================================================================

## 2024-12-14 NOTE — TELEPHONE ENCOUNTER
Needs to increase to the 5 mg weekly dose as the 2.5 is only supposed to be for 4 weeks as long as no significant adverse side effects.

## 2024-12-20 ENCOUNTER — LAB ENCOUNTER (OUTPATIENT)
Dept: LAB | Age: 39
End: 2024-12-20
Attending: Other
Payer: COMMERCIAL

## 2024-12-20 DIAGNOSIS — R94.02 ABNORMAL BRAIN SCAN: Primary | ICD-10-CM

## 2024-12-20 DIAGNOSIS — R20.2 PARESTHESIAS: ICD-10-CM

## 2024-12-20 PROCEDURE — 36415 COLL VENOUS BLD VENIPUNCTURE: CPT

## 2024-12-20 PROCEDURE — 86618 LYME DISEASE ANTIBODY: CPT

## 2024-12-23 ENCOUNTER — HOSPITAL ENCOUNTER (OUTPATIENT)
Age: 39
Discharge: HOME | End: 2024-12-23
Payer: COMMERCIAL

## 2024-12-23 VITALS
SYSTOLIC BLOOD PRESSURE: 139 MMHG | DIASTOLIC BLOOD PRESSURE: 82 MMHG | OXYGEN SATURATION: 100 % | RESPIRATION RATE: 20 BRPM | HEART RATE: 75 BPM | TEMPERATURE: 98 F

## 2024-12-23 DIAGNOSIS — U07.1 COVID-19 VIRUS INFECTION: Primary | ICD-10-CM

## 2024-12-23 DIAGNOSIS — R50.9 FEVER: ICD-10-CM

## 2024-12-23 LAB
POCT INFLUENZA A: NEGATIVE
POCT INFLUENZA B: NEGATIVE
SARS-COV-2 RNA RESP QL NAA+PROBE: DETECTED

## 2024-12-23 PROCEDURE — U0002 COVID-19 LAB TEST NON-CDC: HCPCS | Performed by: NURSE PRACTITIONER

## 2024-12-23 PROCEDURE — 87502 INFLUENZA DNA AMP PROBE: CPT | Performed by: NURSE PRACTITIONER

## 2024-12-23 PROCEDURE — 99213 OFFICE O/P EST LOW 20 MIN: CPT | Performed by: NURSE PRACTITIONER

## 2024-12-23 RX ORDER — ONDANSETRON 4 MG/1
4 TABLET, ORALLY DISINTEGRATING ORAL EVERY 4 HOURS PRN
Qty: 10 TABLET | Refills: 0 | Status: SHIPPED | OUTPATIENT
Start: 2024-12-23 | End: 2024-12-30

## 2024-12-23 RX ORDER — NIRMATRELVIR AND RITONAVIR 300-100 MG
3 KIT ORAL 2 TIMES DAILY
Qty: 30 EACH | Refills: 0 | Status: SHIPPED | OUTPATIENT
Start: 2024-12-23 | End: 2024-12-28

## 2024-12-23 NOTE — ED INITIAL ASSESSMENT (HPI)
Pt came in due to body aches and chills for the past couple of days. Pt has easy non labored respirations.

## 2024-12-23 NOTE — ED PROVIDER NOTES
Patient Seen in: [facility]      History     Chief Complaint   Patient presents with    Body ache and/or chills     Stated Complaint: CHILLS    Subjective:   HPI  Patient is an 39-year-old female that presents to the immediate care center today with concern for fever, chills, cough, congestion that started 2 days ago. Pt denies known illness exposure.  Pt. has been eating and drinking without difficulty.  She has had no shortness of air; no abdominal or back pain; no headache or dizziness.          Objective:     Past Medical History:    Allergic rhinitis    Anxiety    Brain cyst    Depression    PCOS (polycystic ovarian syndrome)              Past Surgical History:   Procedure Laterality Date          Hysterectomy      Other surgical history Bilateral 2015    Essure                No pertinent social history.            Review of Systems   Constitutional:  Positive for chills, fatigue and fever.   HENT:  Positive for congestion.    Respiratory:  Positive for cough.    Gastrointestinal:  Positive for nausea.   Skin:  Negative for rash.   Neurological:  Negative for dizziness and weakness.       Positive for stated complaint: CHILLS  Other systems are as noted in HPI.  Constitutional and vital signs reviewed.      All other systems reviewed and negative except as noted above.    Physical Exam     ED Triage Vitals [24 1547]   /82   Pulse 75   Resp 20   Temp 97.8 °F (36.6 °C)   Temp src Oral   SpO2 100 %   O2 Device None (Room air)       Current Vitals:   Vital Signs  BP: 139/82  Pulse: 75  Resp: 20  Temp: 97.8 °F (36.6 °C)  Temp src: Oral    Oxygen Therapy  SpO2: 100 %  O2 Device: None (Room air)        Physical Exam  Vitals and nursing note reviewed.   Constitutional:       General: She is not in acute distress.     Appearance: She is ill-appearing. She is not toxic-appearing.   HENT:      Head: Normocephalic and atraumatic.   Pulmonary:      Effort: Pulmonary effort is normal.  No respiratory distress.      Breath sounds: Normal breath sounds.   Skin:     General: Skin is warm and dry.      Findings: No rash.   Neurological:      Mental Status: She is alert and oriented to person, place, and time.   Psychiatric:         Behavior: Behavior normal.             ED Course     Labs Reviewed   RAPID SARS-COV-2 BY PCR - Abnormal; Notable for the following components:       Result Value    Rapid SARS-CoV-2 by PCR Detected (*)     All other components within normal limits   POCT FLU TEST - Normal    Narrative:     This assay is a rapid molecular in vitro test utilizing nucleic acid amplification of influenza A and B viral RNA.                   MDM      Patient does have history of multiple sclerosis, and her some risk for complications for COVID-19 infection.  For this reason, she was given prescription for Paxlovid that she will complete as directed.              Medical Decision Making  Differential diagnoses considered included, but are not exclusive of: bacterial vs viral sinusitis, dehydration, pneumonia, influenza, Covid-19 infection, and other viral upper respiratory infection.       Problems Addressed:  COVID-19 virus infection: self-limited or minor problem    Amount and/or Complexity of Data Reviewed  Labs:  Decision-making details documented in ED Course.    Risk  OTC drugs.  Prescription drug management.        Disposition and Plan     Clinical Impression:  1. COVID-19 virus infection    2. Fever         Disposition:  Discharge  12/23/2024  4:26 pm    Follow-up:  Farzana Oscar DO  [address] [facility]  [address] [place] [id number]  [phone]      As needed    [facility]  [address] [facility] [address]  [phone]  Go to   If symptoms worsen          Medications Prescribed:  Current Discharge Medication List        START taking these medications    Details   nirmatrelvir-ritonavir (PAXLOVID, 300/100,) 300-100 MG Oral Tablet Therapy Pack Take 3  tablets by mouth 2 (two) times daily for 5 days. 300mg nirmatrelvir + 100mg ritonavir in each dose.  Qty: 30 each, Refills: 0      ondansetron 4 MG Oral Tablet Dispersible Take 1 tablet (4 mg total) by mouth every 4 (four) hours as needed for Nausea.  Qty: 10 tablet, Refills: 0                 Supplementary Documentation:

## 2024-12-25 LAB — B BURGDOR IGG+IGM SER QL: NEGATIVE

## 2025-01-09 DIAGNOSIS — E78.2 MIXED HYPERLIPIDEMIA: ICD-10-CM

## 2025-01-09 DIAGNOSIS — E66.811 OBESITY (BMI 30.0-34.9): ICD-10-CM

## 2025-01-10 RX ORDER — TIRZEPATIDE 5 MG/.5ML
5 INJECTION, SOLUTION SUBCUTANEOUS WEEKLY
Qty: 2 ML | Refills: 0 | Status: SHIPPED | OUTPATIENT
Start: 2025-01-10

## 2025-01-10 NOTE — TELEPHONE ENCOUNTER
Please review.  Protocol failed / Has no protocol.  Marked High Priority, patient states out of medication    Which GLP is the patient on: zepbound  Current dose: 5mg  Patient seeking refill  How many doses of current dose completed: 4  Side effects, if any:   Current weight / how much weight loss:   Last visit: 11/1/24    Future Appointments  Date Type Provider Dept   02/03/25 Appointment Farzana Oscar DO [facility]      Requested Prescriptions   Pending Prescriptions Disp Refills    Tirzepatide-Weight Management (ZEPBOUND) 5 MG/0.5ML Subcutaneous Solution Auto-injector 2 mL 0     Sig: Inject 5 mg into the skin once a week.       There is no refill protocol information for this order        Future Appointments         Provider Department Appt Notes    In 3 weeks Farzana Oscar DO [facility] RX Follow-up          Recent Outpatient Visits              1 month ago Rathke's cyst (HCC)    [facility] Aleisha Downey MD    Office Visit    2 months ago Obesity (BMI 30.0-34.9)    [facility] Farzana Oscar DO    Office Visit    4 months ago Neuropathy    [facility], Ramier, DO    Office Visit    7 months ago Pituitary lesion (HCC)    [facility] [place] Aleisha Downey MD    Office Visit    11 months ago Encounter for routine adult health examination without abnormal findings    [facility] Farzana Oscar DO    Office Visit

## 2025-01-23 ENCOUNTER — HOSPITAL ENCOUNTER (OUTPATIENT)
Age: 40
Discharge: HOME | End: 2025-01-23
Payer: COMMERCIAL

## 2025-01-23 VITALS
RESPIRATION RATE: 20 BRPM | TEMPERATURE: 98 F | SYSTOLIC BLOOD PRESSURE: 150 MMHG | DIASTOLIC BLOOD PRESSURE: 74 MMHG | OXYGEN SATURATION: 100 % | HEART RATE: 61 BPM

## 2025-01-23 DIAGNOSIS — L02.31 CUTANEOUS ABSCESS OF BUTTOCK: Primary | ICD-10-CM

## 2025-01-23 PROCEDURE — 99213 OFFICE O/P EST LOW 20 MIN: CPT | Performed by: NURSE PRACTITIONER

## 2025-01-23 RX ORDER — DULOXETIN HYDROCHLORIDE 30 MG/1
CAPSULE, DELAYED RELEASE ORAL
COMMUNITY
Start: 2025-01-20

## 2025-01-23 RX ORDER — CEPHALEXIN 500 MG/1
500 CAPSULE ORAL 3 TIMES DAILY
Qty: 21 CAPSULE | Refills: 0 | Status: SHIPPED | OUTPATIENT
Start: 2025-01-23 | End: 2025-01-30

## 2025-01-23 NOTE — ED INITIAL ASSESSMENT (HPI)
Pt states 3 days ago noticing the start or a cyst on her left buttock, pt states since then has become larger, red and warm. Pt states is painful   
Pupils equal, round and reactive to light, Extra-ocular movement intact, eyes are clear b/l

## 2025-01-23 NOTE — ED PROVIDER NOTES
Patient Seen in: [facility]      History     Chief Complaint   Patient presents with    Skin Problem     Cyst - Entered by patient     Stated Complaint: Skin Problem - Cyst    Subjective:   Well-appearing 39-year-old female with polycystic ovarian syndrome, mixed hyperlipidemia, and multiple sclerosis presents with complaints of noticing swelling to her left buttocks 3 days ago.  Patient communicates that now swelling has increased, is red and warm.  Patient denies drainage.  Patient denies trauma.  No over-the-counter medications have been taken for symptoms.  Patient denies fever or chills.                    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   Tobacco Use    Smoking status: Every Day     Current packs/day: 1.00     Average packs/day: 1 pack/day for 23.9 years (23.9 ttl pk-yrs)     Types: Cigarettes     Start date: 2000     Last attempt to quit: 2020    Smokeless tobacco: Never   Vaping Use    Vaping status: Never Used   Substance and Sexual Activity    Alcohol use: Yes     Alcohol/week: 7.0 standard drinks of alcohol     Types: 7 Glasses of wine per week     Comment: 2-3 glasses of wine about 4x/week    Drug use: Yes     Frequency: 5.0 times per week     Types: Cannabis     Comment: cannabis    Sexual activity: Yes     Partners: Male   Social History Narrative    No h/o of abuse        Lives with son      Social Drivers of Health     Financial Resource Strain: High Risk (2022)    [facility]    Overall Financial Resource Strain (CARDIA)      Difficulty of Paying Living Expenses: Hard   Food Insecurity: Low Risk  (11/15/2024)    Received from [facility]    Food Insecurity     Have there been times that your food ran out, and you didn't have money to get more?: No     Are there times that you worry that this might happen?: No   Transportation Needs: Low Risk  (11/15/2024)    Received from [facility]    Transportation Needs     Do you have trouble getting transportation to medical appointments?: No     How do you normally get to and from your appointments?: Public Transit (such as [employer], [employer], [employer])   Physical Activity: Insufficiently Active (1/9/2022)    Received from [facility]    Exercise Vital Sign     Days of Exercise per Week: 3 days     Minutes of Exercise per Session: 30 min   Stress: Stress Concern Present (1/9/2022)    Received from [facility]    [employer] [place] [employer] - Occupational Stress Questionnaire     Feeling of Stress : To some extent   Social Connections: Socially Isolated (1/9/2022)    Received from [facility]    Social Connection and Isolation Panel [NHANES]     Frequency of Communication with Friends and Family: Once a week     Frequency of Social Gatherings with Friends and Family: Never     Attends [employer] Services: Never     Active Member of Clubs or Organizations: No     Attends Club or Organization Meetings: Never     Marital Status: Living with partner              Review of Systems    Positive for stated complaint: Skin Problem - Cyst  Other systems are as noted in HPI.  Constitutional and vital signs reviewed.      All other systems reviewed and negative except as noted above.    Physical Exam     ED Triage Vitals [01/23/25 0946]   /74   Pulse 61   Resp 20   Temp 97.9 °F (36.6 °C)   Temp src Oral   SpO2 100 %   O2 Device None (Room air)       Current Vitals:   Vital Signs  BP: 150/74  Pulse: 61  Resp: 20  Temp: 97.9  °F (36.6 °C)  Temp src: Oral    Oxygen Therapy  SpO2: 100 %  O2 Device: None (Room air)      Physical Exam  VS: Vital signs reviewed. 02 saturation within normal limits for this patient.    General: Patient is awake and alert, oriented to person, place and time. Pt appears non-toxic.     HEENT: Head is normocephalic, atraumatic. Nonicteric sclera, no conjunctival injection. No facial droop or slurred speech. No oral lesions or pallor. Mucous membranes moist.    Neck: Supple. Normal ROM.    Lungs: Good inspiratory effort. No accessory muscle use or tachypnea.    Extremities: No focal swelling or tenderness. Capillary refill noted.     Skin: Warm, dry and normal in color.   Area of erythema, warmth and tenderness present to left anterior buttocks. NO fluctuance. NO red streaking.     Psychiatric: Normal affect, judgement normal, insight normal.     CNS: Moves all 4 extremities. Interacts appropriately. No gait abnormality. Memory normal.        ED Course   Labs Reviewed - No data to display    MDM   Medical Decision Making  Well-appearing.  Patient declined a chaperone.  There is erythema, warmth and tenderness present to left anterior buttocks.  No fluctuance.  No red streaking.  I empirically prescribed cephalexin 3 times daily x 7 days.  I discussed warm compresses 3 times daily for 10 to 15 minutes each.  I discussed over-the-counter acetaminophen and/or ibuprofen as needed for pain or discomfort.  I discussed that if area becomes soft/fluctuant, she should return as she might need incision and drainage.  PMD follow-up as well as return precautions discussed.     Problems Addressed:  Cutaneous abscess of buttock: acute illness or injury    Risk  OTC drugs.  Prescription drug management.        Disposition and Plan     Clinical Impression:  1. Cutaneous abscess of buttock         Disposition:  Discharge  1/23/2025 10:13 am    Follow-up:  Farzana Oscar DO  [address] [facility]  [address] [place] [address]  [id number]  [phone]    In 1 week  As needed          Medications Prescribed:  Discharge Medication List as of 1/23/2025 10:17 AM        START taking these medications    Details   cephALEXin 500 MG Oral Cap Take 1 capsule (500 mg total) by mouth 3 (three) times daily for 7 days., Normal, Disp-21 capsule, R-0                 Supplementary Documentation:

## 2025-01-28 NOTE — PROGRESS NOTES
CC:  Patient presents with: Follow - Up: stich removal from toe      HPI: 27year old female here for suture removal from toe.   Reports about 10 days (5/17) ago she slipped on her deck when it was wet and her toenail came partially off and she cut the out Spoke with Fiona at [facility]  She just saw patient this morning and no complaints from patient at this time.    Advised if anything further needed just to call our office.    Closing encounter.   equivalent per week        Binge frequency: Never      Drug use: Yes        Types: Cannabis        Comment: for sleep a few days a week      Sexual activity: Not Currently        Partners: Male    Lifestyle      Physical activity:        Days per week: Not Encounters:  05/28/20 : 193 lb (87.5 kg)  01/07/20 : 190 lb (86.2 kg)  06/11/19 : 184 lb (83.5 kg)  02/12/19 : 195 lb (88.5 kg)  12/08/18 : 189 lb (85.7 kg)  11/19/18 : 188 lb (85.3 kg)        Body mass index is 32.12 kg/m².     Physical:  General:  Alert,

## 2025-02-01 ENCOUNTER — TELEMEDICINE (OUTPATIENT)
Dept: TELEHEALTH | Age: 40
End: 2025-02-01
Payer: COMMERCIAL

## 2025-02-01 DIAGNOSIS — J11.1 INFLUENZA-LIKE ILLNESS: Primary | ICD-10-CM

## 2025-02-01 DIAGNOSIS — Z20.828 EXPOSURE TO INFLUENZA: ICD-10-CM

## 2025-02-01 PROCEDURE — 98004 SYNCH AUDIO-VIDEO EST SF 10: CPT | Performed by: PHYSICIAN ASSISTANT

## 2025-02-01 RX ORDER — OSELTAMIVIR PHOSPHATE 75 MG/1
75 CAPSULE ORAL 2 TIMES DAILY
Qty: 10 CAPSULE | Refills: 0 | Status: SHIPPED | OUTPATIENT
Start: 2025-02-01 | End: 2025-02-06

## 2025-02-01 NOTE — PROGRESS NOTES
Virtual/Telephone Check-In    Sidra Laird verbally consents to a Virtual/Telephone Check-In service on 02/01/25.  Patient has been referred to the [facility] website at [email or web address] to review the yearly Consent to Treat document.  Patient understands and accepts financial responsibility for any deductible, co-insurance and/or co-pays associated with this service.       Telehealth Verbal Consent   I conducted a telehealth visit with Sidra Laird today, 02/01/25, which was completed using two-way, real-time interactive audio and video communication. This has been done in good ese to provide continuity of care in the best interest of the provider-patient relationship, due to the COVID -19 public health crisis/national emergency where restrictions of face-to-face office visits are ongoing. Every conscious effort was taken to allow for sufficient and adequate time to complete the visit.  The patient was made aware of the limitations of the telehealth visit, including treatment limitations as no physical exam could be performed.  The patient was advised to call 911 or to go to the ER in case there was an emergency.  The patient was also advised of the potential privacy & security concerns related to the telehealth platform.   The patient was made aware of where to find [facility]'s notice of privacy practices, telehealth consent form and other related consent forms and documents.  which are located on the [facility] website. The patient verbally agreed to telehealth consent form, related consents and the risks discussed.    Lastly, the patient confirmed that they were in [place].   Included in this visit, time may have been spent reviewing labs, medications, radiology tests and decision making. Appropriate medical decision-making and tests are ordered as detailed in the plan of care above.  Coding/billing information is submitted for this visit based on complexity of care and/or time spent for the  visit.    CHIEF COMPLAINT:  Chief Complaint   Patient presents with    Flu       HPI:  Sidra Laird is a 39 year old female who presents for a video visit.  Patient reports body aches, chills, sore throat and headache that started today. Pt's partner tested positive for Flu A today and his sx started 2 days ago. Pt is immunocompromised.  Patient denies fever or cough at this time.  Patient has not taken anything OTC.     Current Outpatient Medications   Medication Sig Dispense Refill    oseltamivir (TAMIFLU) 75 MG Oral Cap Take 1 capsule (75 mg total) by mouth 2 (two) times daily for 5 days. 10 capsule 0    DULoxetine 30 MG Oral Cap DR Particles       Tirzepatide-Weight Management (ZEPBOUND) 5 MG/0.5ML Subcutaneous Solution Auto-injector Inject 5 mg into the skin once a week. 2 mL 0    Cholecalciferol (VITAMIN D3) 1.25 MG (10889 UT) Oral Tab Take 1 tablet by mouth once a week for 12 doses. 12 tablet 0    buPROPion  MG Oral Tablet 24 Hr Take 1 tablet (150 mg total) by mouth daily.      LORazepam 0.5 MG Oral Tab Take 1 tablet (0.5 mg total) by mouth 2 (two) times daily as needed. 20 tablet 0    cholecalciferol 50 MCG (2000 UT) Oral Cap Take 50 mcg by mouth daily.       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   Tobacco Use    Smoking status: Every Day     Current packs/day: 1.00     Average packs/day: 1 pack/day for 23.9 years (23.9 ttl pk-yrs)     Types: Cigarettes     Start date: 2000     Last attempt to quit: 2020    Smokeless tobacco: Never   Vaping Use    Vaping status: Never Used   Substance and Sexual Activity     Alcohol use: Yes     Alcohol/week: 7.0 standard drinks of alcohol     Types: 7 Glasses of wine per week     Comment: 2-3 glasses of wine about 4x/week    Drug use: Yes     Frequency: 5.0 times per week     Types: Cannabis     Comment: cannabis    Sexual activity: Yes     Partners: Male   Social History Narrative    No h/o of abuse        Lives with son      Social Drivers of Health     Financial Resource Strain: High Risk (1/9/2022)    Received from [facility]    Overall Financial Resource Strain (CARDIA)     Difficulty of Paying Living Expenses: Hard   Food Insecurity: Low Risk  (11/15/2024)    Received from [facility]    Food Insecurity     Have there been times that your food ran out, and you didn't have money to get more?: No     Are there times that you worry that this might happen?: No   Transportation Needs: Low Risk  (11/15/2024)    Received from [facility]    Transportation Needs     Do you have trouble getting transportation to medical appointments?: No     How do you normally get to and from your appointments?: Public Transit (such as [employer], [employer], [employer])   Physical Activity: Insufficiently Active (1/9/2022)    Received from [facility]    Exercise Vital Sign     Days of Exercise per Week: 3 days     Minutes of Exercise per Session: 30 min   Stress: Stress Concern Present (1/9/2022)    Received from [facility]    [employer] - Occupational Stress Questionnaire     Feeling of Stress : To some extent   Social Connections: Socially Isolated (1/9/2022)    Received from [facility]    Social Connection and Isolation Panel [NHANES]     Frequency of Communication with Friends and Family: Once a week     Frequency of Social Gatherings with Friends and Family: Never     Attends Jew Services: Never     Active Member of Clubs or Organizations: No     Attends Club or Organization Meetings:  Never     Marital Status: Living with partner        REVIEW OF SYSTEMS:  GENERAL: ok appetite, +fatigue  SKIN: no rashes or abnormal skin lesions  HEENT: See HPI  LUNGS: no shortness of breath or wheezing, See HPI  CARDIOVASCULAR: no chest pain or palpitations   GI: denies N/V/C or abdominal pain  NEURO: + headaches    EXAM:  General: Alert, Well-appearing, and In no acute distress  Respiratory:   Speaking in full sentences comfortably  Normal work of breathing  No cough during visit  Head: Normocephalic  Nose: No obvious nasal discharge.  Skin: No obvious rashes or lesions from what observed.     No results found for this or any previous visit (from the past 24 hours).    ASSESSMENT AND PLAN:  Sidra Laird is a 39 year old female who presents with symptoms that are consistent with    ASSESSMENT:   Encounter Diagnoses   Name Primary?    Influenza-like illness Yes    Exposure to influenza        PLAN: With close household exposure and symptoms and immunocompromised state will treat empirically for presumed Flu A. Meds as below.  See patient Instructions    Meds & Refills for this Visit:  Requested Prescriptions     Signed Prescriptions Disp Refills    oseltamivir (TAMIFLU) 75 MG Oral Cap 10 capsule 0     Sig: Take 1 capsule (75 mg total) by mouth 2 (two) times daily for 5 days.       Risks, benefits, and side effects of medication explained and discussed.    There are no Patient Instructions on file for this visit.    The patient indicates understanding of these issues and agrees to the plan.  The patient is asked to f/u with PCP if sx's persist or worsen.    Sidra Laird understands video visit evaluation is not a substitute for face-to-face examination or emergency care. Patient advised to go to ER or call 911 for worsening symptoms or acute distress.

## 2025-02-02 DIAGNOSIS — E78.2 MIXED HYPERLIPIDEMIA: ICD-10-CM

## 2025-02-02 DIAGNOSIS — E66.811 OBESITY (BMI 30.0-34.9): ICD-10-CM

## 2025-02-02 RX ORDER — TIRZEPATIDE 5 MG/.5ML
5 INJECTION, SOLUTION SUBCUTANEOUS WEEKLY
Qty: 2 ML | Refills: 0 | Status: CANCELLED | OUTPATIENT
Start: 2025-02-02

## 2025-02-05 RX ORDER — TIRZEPATIDE 7.5 MG/.5ML
7.5 INJECTION, SOLUTION SUBCUTANEOUS WEEKLY
Qty: 2 ML | Refills: 0 | Status: SHIPPED | OUTPATIENT
Start: 2025-02-05 | End: 2025-03-05 | Stop reason: DRUGHIGH

## 2025-02-05 NOTE — TELEPHONE ENCOUNTER
Please review.     Which GLP is the patient on: Zepbound   Current dose: 5 MG  Patient seeking refill or increase to next dose: next dose  How many doses of current dose completed: all   Side effects, if any: a little heartburn that is manageable   Current weight / how much weight loss: was 190-195 when started current weight is 182  Last visit: 11/01/2024        Routing high priority due to patient leaving country and Zepbound can be hard to find in stock     Patient Comment : I’ve had a little heart burn that I’ve been able to manage but otherwise, no problems.  I’ve lost weight. I am now 182. I believe I was 190 or 195 when I started.   I’ve completed all of the doses as of Sunday.  I am ready to move to the next.     I am leaving on a trip out of the country at 4:00 am Saturday. It’s always delayed with [facility] once you approve it and I end up chasing it all over the place cause they run out. The sooner approved,  the better so I can make sure I can get it before I go. Thanks!

## 2025-02-05 NOTE — TELEPHONE ENCOUNTER
1. Obesity (BMI 30.0-34.9)     -Given persistent weight gain when she tries to quit smoking despite eating well and exercising will trial Zepbound 2.5 mg weekly and increase monthly as needed and tolerated  - This medication will also hopefully help her quit smoking and decrease her drinking  -No personal history of pancreatitis and no personal or family history of medullary thyroid cancer or MEN syndrome Type 2.   - Tirzepatide-Weight Management (ZEPBOUND) 2.5 MG/0.5ML Subcutaneous Solution Auto-injector; Inject 2.5 mg into the skin once a week.  Dispense: 2 mL; Refill: 0

## 2025-03-01 DIAGNOSIS — E66.811 OBESITY (BMI 30.0-34.9): ICD-10-CM

## 2025-03-01 DIAGNOSIS — F41.9 ANXIETY: ICD-10-CM

## 2025-03-01 DIAGNOSIS — E78.2 MIXED HYPERLIPIDEMIA: ICD-10-CM

## 2025-03-01 RX ORDER — TIRZEPATIDE 7.5 MG/.5ML
7.5 INJECTION, SOLUTION SUBCUTANEOUS WEEKLY
Qty: 2 ML | Refills: 0 | Status: CANCELLED | OUTPATIENT
Start: 2025-03-01

## 2025-03-05 RX ORDER — TIRZEPATIDE 10 MG/.5ML
10 INJECTION, SOLUTION SUBCUTANEOUS WEEKLY
Qty: 2 ML | Refills: 0 | Status: SHIPPED | OUTPATIENT
Start: 2025-03-05

## 2025-03-05 RX ORDER — LORAZEPAM 0.5 MG/1
0.5 TABLET ORAL 2 TIMES DAILY PRN
Qty: 20 TABLET | Refills: 0 | Status: SHIPPED | OUTPATIENT
Start: 2025-03-05

## 2025-03-05 NOTE — TELEPHONE ENCOUNTER
Please kindly review this medication    [x] FAILS PROTOCOL    [x] HAS NO PROTOCOL ATTACHED    Recent fill dates: 11/17/24  Date of  last written prescription: 9/30/24   Last written quantity: #20 each and processed as a 20 day supply  LAST OFFICE VISIT: 11/1/24  [x] Takes as needed  [] Takes scheduled daily    Zepbound 10 mg pened    Per patient's mychart response to GLP-1 questions:  What is your current weight? 179  - How much weight have you lost since starting? I lost about 15 lbs  - Are you weighing yourself at home, if so how often? Yes, once every couple weeks  - Do you have any side effects or concerns about your current 7.5 dose? No  - Do you feel ready to move to the next dose, which would be 10 mg? Yes

## 2025-03-27 ENCOUNTER — HOSPITAL ENCOUNTER (OUTPATIENT)
Age: 40
Discharge: HOME | End: 2025-03-27
Payer: COMMERCIAL

## 2025-03-27 VITALS
HEART RATE: 80 BPM | OXYGEN SATURATION: 97 % | DIASTOLIC BLOOD PRESSURE: 83 MMHG | RESPIRATION RATE: 18 BRPM | TEMPERATURE: 98 F | SYSTOLIC BLOOD PRESSURE: 142 MMHG

## 2025-03-27 DIAGNOSIS — N75.0 BARTHOLIN CYST: ICD-10-CM

## 2025-03-27 DIAGNOSIS — N75.1 BARTHOLIN'S GLAND ABSCESS: Primary | ICD-10-CM

## 2025-03-27 PROCEDURE — 99213 OFFICE O/P EST LOW 20 MIN: CPT | Performed by: EMERGENCY MEDICINE

## 2025-03-27 PROCEDURE — S0119 ONDANSETRON 4 MG: HCPCS | Performed by: EMERGENCY MEDICINE

## 2025-03-27 PROCEDURE — 56420 I&D BARTHOLINS GLAND ABSCESS: CPT | Performed by: EMERGENCY MEDICINE

## 2025-03-27 RX ORDER — CLINDAMYCIN PHOSPHATE 10 MG/G
GEL TOPICAL
Qty: 60 G | Refills: 0 | Status: SHIPPED | OUTPATIENT
Start: 2025-03-27

## 2025-03-27 RX ORDER — ONDANSETRON 4 MG/1
4 TABLET, ORALLY DISINTEGRATING ORAL ONCE
Status: COMPLETED | OUTPATIENT
Start: 2025-03-27 | End: 2025-03-27

## 2025-03-27 RX ORDER — HYDROCODONE BITARTRATE AND ACETAMINOPHEN 5; 325 MG/1; MG/1
TABLET ORAL
Qty: 10 TABLET | Refills: 0 | Status: SHIPPED | OUTPATIENT
Start: 2025-03-27

## 2025-03-27 RX ORDER — HYDROCODONE BITARTRATE AND ACETAMINOPHEN 5; 325 MG/1; MG/1
1 TABLET ORAL ONCE
Status: COMPLETED | OUTPATIENT
Start: 2025-03-27 | End: 2025-03-27

## 2025-03-27 RX ORDER — CEPHALEXIN 500 MG/1
500 CAPSULE ORAL 2 TIMES DAILY
Qty: 14 CAPSULE | Refills: 0 | Status: SHIPPED | OUTPATIENT
Start: 2025-03-27 | End: 2025-04-03

## 2025-03-28 NOTE — ED INITIAL ASSESSMENT (HPI)
Pt states having a vaginal abscess for the last 3 days that has worsened. Pt states recently started on Ocrevus and since then has been having boils and abscess pop up.

## 2025-03-28 NOTE — ED PROVIDER NOTES
Patient Seen in: [facility]      History     Chief Complaint   Patient presents with    Abscess     Stated Complaint: Abscess    Subjective:   HPI  Sidra Laird is a 39 year old female here for abscess to her vaginal region.  Symptoms started 3 days ago, getting worse.  Tender to palpation.  History of cyst, and abscess she is to started her new DMT medication/infusion for MS.         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   Tobacco Use    Smoking status: Every Day     Current packs/day: 1.00     Average packs/day: 1 pack/day for 24.0 years (24.0 ttl pk-yrs)     Types: Cigarettes     Start date: 2000     Last attempt to quit: 2020    Smokeless tobacco: Never   Vaping Use    Vaping status: Never Used   Substance and Sexual Activity    Alcohol use: Yes     Alcohol/week: 7.0 standard drinks of alcohol     Types: 7 Glasses of wine per week     Comment: 2-3 glasses of wine about 4x/week    Drug use: Yes     Frequency: 5.0 times per week     Types: Cannabis     Comment: cannabis    Sexual activity: Yes     Partners: Male   Social History Narrative    No h/o of abuse        Lives with son      Social Drivers of Health     Food Insecurity: Low Risk  (11/15/2024)    Received from [facility]    Food Insecurity     Have there been times that your food ran out, and you didn't have money to get more?: No     Are there times that you worry that this might happen?: No   Transportation Needs: Low Risk  (11/15/2024)    Received from [facility]     Transportation Needs     Do you have trouble getting transportation to medical appointments?: No     How do you normally get to and from your appointments?: Public Transit (such as [employer], [employer], [employer])              Review of Systems    Positive for stated complaint: Abscess  Other systems are as noted in HPI.  Constitutional and vital signs reviewed.      All other systems reviewed and negative except as noted above.    Physical Exam     ED Triage Vitals [03/27/25 1859]   /83   Pulse 80   Resp 18   Temp 97.8 °F (36.6 °C)   Temp src Oral   SpO2 97 %   O2 Device None (Room air)       Current Vitals:   Vital Signs  BP: 142/83  Pulse: 80  Resp: 18  Temp: 97.8 °F (36.6 °C)  Temp src: Oral    Oxygen Therapy  SpO2: 97 %  O2 Device: None (Room air)        Physical Exam  Vitals and nursing note reviewed. Chaperone present: declined chaperone.   Constitutional:       Appearance: Normal appearance.   HENT:      Head: Normocephalic.      Nose: Nose normal.   Eyes:      Pupils: Pupils are equal, round, and reactive to light.   Cardiovascular:      Pulses: Normal pulses.   Pulmonary:      Effort: Pulmonary effort is normal.   Genitourinary:      Skin:     Capillary Refill: Capillary refill takes less than 2 seconds.   Neurological:      General: No focal deficit present.      Mental Status: She is alert and oriented to person, place, and time.   Psychiatric:         Mood and Affect: Mood normal.         Behavior: Behavior normal.         Thought Content: Thought content normal.         Judgment: Judgment normal.             ED Course   Labs Reviewed - No data to display     Pt Identifiers completed. Correct location marked. The abscess was anesthetized with lidocaine. A 0.5cm vertical incision was performed with an 11 blade.  Pus was expressed and loculations were freed.  Does not pack wound at this time.  Pt tolerate procedure well. <5cc blood loss.    MDM           Medical Decision Making  Treated in house for bartholin  abscess.  Norco given here for pain control.  Antibiotic sent to the pharmacy take as directed.  Differential diagnosis includes not limited to simple Bartholin cyst, ingrown hair, contusion, or somatic causes symptoms.   Due to frequent abscesses I sent clindamycin gel to the pharmacy at the start of symptoms to see if this helps.  Also discussed with her infusion provider to let them know that she is getting abscesses after starting her new DMT infusion for MS  No active fever, and pain better prior to discharge .  All questions answered, and reassurance given.  gyne vs primary care follow-up.  No acute distress and cleared for home with family.         Problems Addressed:  Bartholin cyst: acute illness or injury  Bartholin's gland abscess: acute illness or injury        Disposition and Plan     Clinical Impression:  1. Bartholin's gland abscess    2. Bartholin cyst         Disposition:  Discharge  3/27/2025  7:46 pm    Follow-up:  Farzana Oscar DO  [address] [place] [address]  [phone]                Medications Prescribed:  Discharge Medication List as of 3/27/2025  7:46 PM        START taking these medications    Details   HYDROcodone-acetaminophen 5-325 MG Oral Tab Half to 1 full tablet every 4-6 hours as needed for breakthrough pain.  There is Tylenol in this medication do not double dose yourself, Normal, Disp-10 tablet, R-0      Clindamycin Phosphate 1 % External Gel Apply to affected area twice a day for 7 days at the start of symptoms, Normal, Disp-60 g, R-0                 Supplementary Documentation:

## 2025-03-28 NOTE — DISCHARGE INSTRUCTIONS
As we discussed we gave you Norco here for pain.  Antinausea medication given as well 2.  [place] sent to the pharmacy to take as needed.  Antibiotic sent to the pharmacy to start tonight.  Try to do warm compress before bed tonight, and then a few times tomorrow.  Soaking in a warm tub can also help, but may cause burning to the area. you will notice drainage over the next few days, but it should not get worse.  Xeroform applied the area.  You should keep this on at bedtime.  Starting tomorrow night.  Clindamycin gel when you feel like symptoms have started.  He will apply twice a day for 7 days.  OB/GYN as needed for follow-up, and primary care as needed for follow-up.  Any new abscesses, or if the abscess return please go to the nearest urgent care

## 2025-04-08 DIAGNOSIS — E78.2 MIXED HYPERLIPIDEMIA: ICD-10-CM

## 2025-04-08 DIAGNOSIS — E66.811 OBESITY (BMI 30.0-34.9): ICD-10-CM

## 2025-04-09 ENCOUNTER — APPOINTMENT (OUTPATIENT)
Dept: CARDIOLOGY | Age: 40
End: 2025-04-09

## 2025-04-09 VITALS
HEIGHT: 65 IN | HEART RATE: 73 BPM | DIASTOLIC BLOOD PRESSURE: 82 MMHG | SYSTOLIC BLOOD PRESSURE: 116 MMHG | WEIGHT: 175 LBS | BODY MASS INDEX: 29.16 KG/M2

## 2025-04-09 DIAGNOSIS — R06.02 SHORTNESS OF BREATH: ICD-10-CM

## 2025-04-09 DIAGNOSIS — R42 DIZZINESS AND GIDDINESS: Primary | ICD-10-CM

## 2025-04-09 RX ORDER — DULOXETIN HYDROCHLORIDE 60 MG/1
CAPSULE, DELAYED RELEASE ORAL
COMMUNITY
Start: 2025-04-08

## 2025-04-09 RX ORDER — TIRZEPATIDE 10 MG/.5ML
10 INJECTION, SOLUTION SUBCUTANEOUS
COMMUNITY
Start: 2025-03-05

## 2025-04-09 RX ORDER — CHOLECALCIFEROL (VITAMIN D3) 1250 MCG
CAPSULE ORAL
COMMUNITY
Start: 2024-12-06

## 2025-04-09 RX ORDER — LORAZEPAM 0.5 MG/1
0.5 TABLET ORAL
COMMUNITY
Start: 2025-03-05

## 2025-04-09 ASSESSMENT — PATIENT HEALTH QUESTIONNAIRE - PHQ9
4. FEELING TIRED OR HAVING LITTLE ENERGY: NEARLY EVERY DAY
3. TROUBLE FALLING OR STAYING ASLEEP OR SLEEPING TOO MUCH: SEVERAL DAYS
SUM OF ALL RESPONSES TO PHQ QUESTIONS 1-9: 13
8. MOVING OR SPEAKING SO SLOWLY THAT OTHER PEOPLE COULD HAVE NOTICED. OR THE OPPOSITE, BEING SO FIGETY OR RESTLESS THAT YOU HAVE BEEN MOVING AROUND A LOT MORE THAN USUAL: SEVERAL DAYS
10. IF YOU CHECKED OFF ANY PROBLEMS, HOW DIFFICULT HAVE THESE PROBLEMS MADE IT FOR YOU TO DO YOUR WORK, TAKE CARE OF THINGS AT HOME, OR GET ALONG WITH OTHER PEOPLE: EXTREMELY DIFFICULT
CLINICAL INTERPRETATION OF PHQ2 SCORE: FURTHER SCREENING NEEDED
CLINICAL INTERPRETATION OF PHQ9 SCORE: MODERATE DEPRESSION
5. POOR APPETITE, WEIGHT LOSS, OR OVEREATING: NOT AT ALL
6. FEELING BAD ABOUT YOURSELF - OR THAT YOU ARE A FAILURE OR HAVE LET YOURSELF OR YOUR FAMILY DOWN: MORE THAN HALF THE DAYS
2. FEELING DOWN, DEPRESSED OR HOPELESS: MORE THAN HALF THE DAYS
SUM OF ALL RESPONSES TO PHQ9 QUESTIONS 1 AND 2: 3
SUM OF ALL RESPONSES TO PHQ9 QUESTIONS 1 AND 2: 3
9. THOUGHTS THAT YOU WOULD BE BETTER OFF DEAD, OR OF HURTING YOURSELF: SEVERAL DAYS
1. LITTLE INTEREST OR PLEASURE IN DOING THINGS: SEVERAL DAYS
7. TROUBLE CONCENTRATING ON THINGS, SUCH AS READING THE NEWSPAPER OR WATCHING TELEVISION: MORE THAN HALF THE DAYS

## 2025-04-11 RX ORDER — TIRZEPATIDE 10 MG/.5ML
10 INJECTION, SOLUTION SUBCUTANEOUS WEEKLY
Qty: 2 ML | Refills: 0 | Status: SHIPPED | OUTPATIENT
Start: 2025-04-11

## 2025-04-11 NOTE — TELEPHONE ENCOUNTER
Please review.  Protocol failed / Has no protocol.    Which GLP is the patient on: Zepbound  Current dose: 10mg  Patient seeking refill   How many doses of current dose completed: 4  Side effects, if any: none  Current weight 173  Last visit: 11/1/24    Future Appointments  Date Type Provider Dept   04/24/25 Appointment Farzana Oscar DO [facility]      Requested Prescriptions   Pending Prescriptions Disp Refills    Tirzepatide-Weight Management (ZEPBOUND) 10 MG/0.5ML Subcutaneous Solution Auto-injector 2 mL 0     Sig: Inject 10 mg into the skin once a week.       There is no refill protocol information for this order

## 2025-04-24 ENCOUNTER — LAB ENCOUNTER (OUTPATIENT)
Dept: LAB | Facility: REFERENCE LAB | Age: 40
End: 2025-04-24
Attending: FAMILY MEDICINE
Payer: COMMERCIAL

## 2025-04-24 ENCOUNTER — OFFICE VISIT (OUTPATIENT)
Facility: CLINIC | Age: 40
End: 2025-04-24
Payer: COMMERCIAL

## 2025-04-24 VITALS
HEART RATE: 70 BPM | OXYGEN SATURATION: 92 % | WEIGHT: 174 LBS | SYSTOLIC BLOOD PRESSURE: 122 MMHG | DIASTOLIC BLOOD PRESSURE: 70 MMHG | TEMPERATURE: 98 F | HEIGHT: 65 IN | BODY MASS INDEX: 28.99 KG/M2

## 2025-04-24 DIAGNOSIS — Z12.31 VISIT FOR SCREENING MAMMOGRAM: ICD-10-CM

## 2025-04-24 DIAGNOSIS — E78.2 MIXED HYPERLIPIDEMIA: ICD-10-CM

## 2025-04-24 DIAGNOSIS — N89.8 VAGINAL CYSTS: ICD-10-CM

## 2025-04-24 DIAGNOSIS — E55.9 VITAMIN D DEFICIENCY: ICD-10-CM

## 2025-04-24 DIAGNOSIS — G35 MULTIPLE SCLEROSIS, PRIMARY PROGRESSIVE (HCC): ICD-10-CM

## 2025-04-24 DIAGNOSIS — E66.811 OBESITY (BMI 30.0-34.9): Primary | ICD-10-CM

## 2025-04-24 LAB
CHOLEST SERPL-MCNC: 228 MG/DL (ref ?–200)
FASTING PATIENT LIPID ANSWER: YES
HDLC SERPL-MCNC: 33 MG/DL (ref 40–59)
LDLC SERPL CALC-MCNC: 143 MG/DL (ref ?–100)
NONHDLC SERPL-MCNC: 195 MG/DL (ref ?–130)
TRIGL SERPL-MCNC: 287 MG/DL (ref 30–149)
VIT D+METAB SERPL-MCNC: 54.3 NG/ML (ref 30–100)
VLDLC SERPL CALC-MCNC: 55 MG/DL (ref 0–30)

## 2025-04-24 PROCEDURE — 3074F SYST BP LT 130 MM HG: CPT | Performed by: FAMILY MEDICINE

## 2025-04-24 PROCEDURE — 82306 VITAMIN D 25 HYDROXY: CPT | Performed by: FAMILY MEDICINE

## 2025-04-24 PROCEDURE — 80061 LIPID PANEL: CPT | Performed by: FAMILY MEDICINE

## 2025-04-24 PROCEDURE — 3078F DIAST BP <80 MM HG: CPT | Performed by: FAMILY MEDICINE

## 2025-04-24 PROCEDURE — 3008F BODY MASS INDEX DOCD: CPT | Performed by: FAMILY MEDICINE

## 2025-04-24 PROCEDURE — 99214 OFFICE O/P EST MOD 30 MIN: CPT | Performed by: FAMILY MEDICINE

## 2025-04-24 RX ORDER — TIRZEPATIDE 10 MG/.5ML
10 INJECTION, SOLUTION SUBCUTANEOUS WEEKLY
Qty: 2 ML | Refills: 0 | Status: SHIPPED | OUTPATIENT
Start: 2025-04-24 | End: 2025-04-24

## 2025-04-24 RX ORDER — TIRZEPATIDE 10 MG/.5ML
10 INJECTION, SOLUTION SUBCUTANEOUS WEEKLY
Qty: 6 ML | Refills: 0 | Status: SHIPPED | OUTPATIENT
Start: 2025-04-24

## 2025-04-24 NOTE — PROGRESS NOTES
CC:    Chief Complaint   Patient presents with    Follow - Up     Follow up on vitamin d level and elevated cholesterol       HPI: 39 year old female here to follow-up on medication for weight loss.  Started on Zepbound in November of 2024, and it has lowered her food cravings and suppressed her appetite.  She had heartburn initially, but that has resolved.  Also without any constipation or diarrhea.  Has been on the Zepbound 10 mg dose for about one month, and would like to stay on this dose.    Will have oatmeal with nuts, berries, and almond milk for breakfast.   Dinner is healthy and home cooked, and has a lot of salad, quinoa, and fish.   Has been eating a lot less sugar and also drinking less on the medication.   She was diagnosed with MS again by Dr. Brizuela at [facility] based on brain scan findings.   Started Ocrevus in November or December.   She has some struggles with walking, leg weakness, and pain in her legs. She is doing physical therapy in [place] with some improvement.   She was told her dizziness is due to a junctional rhythm, but was told she can still exercise.   She is getting a lot of cysts that turn into abscesses due to the Ocrevus.  She saw a dermatologist about this, and they recommend she use a wash for acne and topical Clindamycin.     ROS:  General:  No fever, no fatigue, weight loss, decreased appetite, hot flashes   HEENT:  Denies congestion or nasal discharge, blurry vision and worsening vision   Cardio:  No chest pain  Pulmonary:  No cough, no SOB  GI:  No N/V/D  :  No discharge, no dysuria, no polyuria, no hematuria, vaginal cysts  Dermatologic:  frequent cysts    Past Medical History[1]    Social History     Socioeconomic History    Marital status: Single     Spouse name: Not on file    Number of children: Not on file    Years of education: Not on file    Highest education level: Not on file   Occupational History    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   Tobacco Use    Smoking status: Every Day     Current packs/day: 1.00     Average packs/day: 1 pack/day for 24.1 years (24.1 ttl pk-yrs)     Types: Cigarettes     Start date: 11/22/2000     Last attempt to quit: 11/22/2020    Smokeless tobacco: Never   Vaping Use    Vaping status: Never Used   Substance and Sexual Activity    Alcohol use: Yes     Alcohol/week: 7.0 standard drinks of alcohol     Types: 7 Glasses of wine per week     Comment: 2-3 glasses of wine about 4x/week    Drug use: Yes     Frequency: 5.0 times per week     Types: Cannabis     Comment: cannabis    Sexual activity: Yes     Partners: Male   Other Topics Concern    Caffeine Concern Not Asked    Exercise Not Asked    Seat Belt Not Asked    Special Diet Not Asked    Stress Concern Not Asked    Weight Concern Not Asked   Social History Narrative    No h/o of abuse        Lives with son      Social Drivers of Health     Food Insecurity: No Food Insecurity (4/24/2025)    NCSS - Food Insecurity     Worried About Running Out of Food in the Last Year: No     Ran Out of Food in the Last Year: No   Transportation Needs: No Transportation Needs (4/24/2025)    NCSS - Transportation     Lack of Transportation: No   Stress: Stress Concern Present (1/9/2022)    Received from [facility] of Occupational Health - Occupational Stress Questionnaire     Feeling of Stress : To some extent   Housing Stability: Not At Risk (4/24/2025)    NCSS - Housing/Utilities     Has Housing: Yes     Worried About Losing Housing: No     Unable to Get Utilities: No       Current Medications[2]    Seasonal, Grass, and Ragweed      Vitals:   Vitals:    04/24/25 1444   BP: 122/70   Pulse: 70   Temp: 98.1 °F (36.7 °C)   TempSrc: Temporal   SpO2: 92%   Weight: 174 lb (78.9 kg)   Height: 5' 5\" (1.651 m)     Wt Readings from Last 6 Encounters:   04/24/25 174 lb (78.9 kg)   12/11/24 195 lb (88.5 kg)   11/01/24 196 lb (88.9 kg)   08/16/24 188 lb (85.3 kg)   05/20/24  187 lb 9.6 oz (85.1 kg)   04/29/24 185 lb (83.9 kg)       Body mass index is 28.96 kg/m².    Physical:  General:  Alert, appropriate, no acute distress   Psych: normal mood and affect    Assessment and Plan: 39-year-old female here to follow-up on medication for weight loss, hyperlipidemia, and recent health conditions.    1. Obesity (BMI 30.0-34.9)    - Has lost 11% of her starting body weight since starting Zepbound, and tolerating medication very well without any concerning side effects  - Will continue at 10 mg weekly dose as she is doing well on it still, and notify me if she wants to increase it at any point in the next several months  - Tirzepatide-Weight Management (ZEPBOUND) 10 MG/0.5ML Subcutaneous Solution Auto-injector; Inject 10 mg into the skin once a week.  Dispense: 6 mL; Refill: 0    2. Mixed hyperlipidemia    - Likely much improved with reduction of alcohol and sugar since starting Zepbound, and will repeat lipid panel today  - Lipid Panel [E]; Future  - Tirzepatide-Weight Management (ZEPBOUND) 10 MG/0.5ML Subcutaneous Solution Auto-injector; Inject 10 mg into the skin once a week.  Dispense: 6 mL; Refill: 0    3. Multiple sclerosis, primary progressive (HCC)    - Recently diagnosed with this and started back on Ocrevus, and will continue follow-up with neurology team    4. Vitamin D deficiency    - Repeat vitamin D level and continue with vitamin d supplement    5. Vaginal cysts    - Recommend she see her gynecologist to discuss preventative measures and treatment options    6. Visit for screening mammogram    - Due for baseline screening mammogram  - [facility] KYRA 2D+3D SCREENING BILAT (CPT=77067/27046); Future      Farzana Oscar DO  04/24/25  2:48 PM         [1]   Past Medical History:   Allergic rhinitis    Anxiety    Brain cyst    Depression    PCOS (polycystic ovarian syndrome)   [2]   Current Outpatient Medications   Medication Sig Dispense Refill    CYANOCOBALAMIN OR Take 1 tablet by mouth daily.       Tirzepatide-Weight Management (ZEPBOUND) 10 MG/0.5ML Subcutaneous Solution Auto-injector Inject 10 mg into the skin once a week. 2 mL 0    Ocrelizumab (OCREVUS IV) Inject into the vein.      LORazepam 0.5 MG Oral Tab Take 1 tablet (0.5 mg total) by mouth 2 (two) times daily as needed. 20 tablet 0    DULoxetine 30 MG Oral Cap DR Particles  (Patient taking differently: Take 3 capsules (90 mg total) by mouth daily.)      cholecalciferol 50 MCG (2000 UT) Oral Cap Take 50 mcg by mouth daily.      HYDROcodone-acetaminophen 5-325 MG Oral Tab Half to 1 full tablet every 4-6 hours as needed for breakthrough pain.  There is Tylenol in this medication do not double dose yourself (Patient not taking: Reported on 4/24/2025) 10 tablet 0    Clindamycin Phosphate 1 % External Gel Apply to affected area twice a day for 7 days at the start of symptoms (Patient not taking: Reported on 4/24/2025) 60 g 0    buPROPion  MG Oral Tablet 24 Hr Take 1 tablet (150 mg total) by mouth daily.

## 2025-05-28 ENCOUNTER — TELEPHONE (OUTPATIENT)
Dept: CARDIOLOGY | Age: 40
End: 2025-05-28

## 2025-06-08 DIAGNOSIS — E66.811 OBESITY (BMI 30.0-34.9): ICD-10-CM

## 2025-06-08 DIAGNOSIS — E78.2 MIXED HYPERLIPIDEMIA: ICD-10-CM

## 2025-06-08 RX ORDER — TIRZEPATIDE 10 MG/.5ML
10 INJECTION, SOLUTION SUBCUTANEOUS WEEKLY
Qty: 6 ML | Refills: 0 | Status: CANCELLED | OUTPATIENT
Start: 2025-06-08

## 2025-06-09 ENCOUNTER — TELEPHONE (OUTPATIENT)
Dept: CARDIOLOGY | Age: 40
End: 2025-06-09

## 2025-06-09 DIAGNOSIS — E78.2 MIXED HYPERLIPIDEMIA: ICD-10-CM

## 2025-06-09 DIAGNOSIS — E66.811 OBESITY (BMI 30.0-34.9): ICD-10-CM

## 2025-06-10 RX ORDER — TIRZEPATIDE 12.5 MG/.5ML
12.5 INJECTION, SOLUTION SUBCUTANEOUS WEEKLY
Qty: 6 ML | Refills: 0 | Status: SHIPPED | OUTPATIENT
Start: 2025-06-10

## 2025-06-10 RX ORDER — TIRZEPATIDE 10 MG/.5ML
INJECTION, SOLUTION SUBCUTANEOUS
Qty: 2 ML | Refills: 0 | OUTPATIENT
Start: 2025-06-10

## 2025-06-10 NOTE — TELEPHONE ENCOUNTER
Please review. Protocol Failed; No Protocol    Please see patients response to GLP:    Weight 170  Weight lost 25 lbs  No side effects  I weigh myself at home once every few weeks  I’m ready for next dose   Please provide a 3 month prescription if possible.     Pended next strength of Zep Bound please advise if refill is appropriate

## 2025-06-11 ENCOUNTER — APPOINTMENT (OUTPATIENT)
Dept: CARDIOLOGY | Age: 40
End: 2025-06-11

## 2025-06-29 DIAGNOSIS — F41.9 ANXIETY: ICD-10-CM

## 2025-06-30 RX ORDER — LORAZEPAM 0.5 MG/1
0.5 TABLET ORAL 2 TIMES DAILY PRN
Qty: 20 TABLET | Refills: 2 | Status: SHIPPED | OUTPATIENT
Start: 2025-06-30

## 2025-06-30 NOTE — TELEPHONE ENCOUNTER
Please review. Refill failed protocol.     Recent fills each # 20 : 3/5/25  Last prescription written: 3/5/25  Last office visit:  4/24/2025    No future appointments.

## 2025-07-02 ENCOUNTER — LAB ENCOUNTER (OUTPATIENT)
Dept: LAB | Facility: REFERENCE LAB | Age: 40
End: 2025-07-02
Attending: INTERNAL MEDICINE
Payer: COMMERCIAL

## 2025-07-02 DIAGNOSIS — G35 MULTIPLE SCLEROSIS (HCC): Primary | ICD-10-CM

## 2025-07-02 DIAGNOSIS — Z51.81 MEDICATION MONITORING ENCOUNTER: ICD-10-CM

## 2025-07-02 LAB
ALBUMIN SERPL-MCNC: 4.6 G/DL (ref 3.2–4.8)
ALBUMIN/GLOB SERPL: 2.1 {RATIO} (ref 1–2)
ALP LIVER SERPL-CCNC: 54 U/L (ref 37–98)
ALT SERPL-CCNC: 15 U/L (ref 10–49)
ANION GAP SERPL CALC-SCNC: 4 MMOL/L (ref 0–18)
AST SERPL-CCNC: 16 U/L (ref ?–34)
BASOPHILS # BLD AUTO: 0.07 X10(3) UL (ref 0–0.2)
BASOPHILS NFR BLD AUTO: 0.8 %
BILIRUB SERPL-MCNC: 0.4 MG/DL (ref 0.3–1.2)
BUN BLD-MCNC: 7 MG/DL (ref 9–23)
BUN/CREAT SERPL: 9.1 (ref 10–20)
CALCIUM BLD-MCNC: 9.4 MG/DL (ref 8.7–10.4)
CHLORIDE SERPL-SCNC: 109 MMOL/L (ref 98–112)
CO2 SERPL-SCNC: 22 MMOL/L (ref 21–32)
CREAT BLD-MCNC: 0.77 MG/DL (ref 0.55–1.02)
DEPRECATED RDW RBC AUTO: 42 FL (ref 35.1–46.3)
EGFRCR SERPLBLD CKD-EPI 2021: 99 ML/MIN/1.73M2 (ref 60–?)
EOSINOPHIL # BLD AUTO: 0.24 X10(3) UL (ref 0–0.7)
EOSINOPHIL NFR BLD AUTO: 2.8 %
ERYTHROCYTE [DISTWIDTH] IN BLOOD BY AUTOMATED COUNT: 12 % (ref 11–15)
FASTING STATUS PATIENT QL REPORTED: NO
GLOBULIN PLAS-MCNC: 2.2 G/DL (ref 2–3.5)
GLUCOSE BLD-MCNC: 107 MG/DL (ref 70–99)
HCT VFR BLD AUTO: 43.7 % (ref 35–48)
HGB BLD-MCNC: 15.3 G/DL (ref 12–16)
IGA SERPL-MCNC: 310.3 MG/DL (ref 40–350)
IGM SERPL-MCNC: 38.8 MG/DL (ref 50–300)
IMM GRANULOCYTES # BLD AUTO: 0.03 X10(3) UL (ref 0–1)
IMM GRANULOCYTES NFR BLD: 0.3 %
IMMUNOGLOBULIN PNL SER-MCNC: 784 MG/DL (ref 650–1600)
LYMPHOCYTES # BLD AUTO: 2.68 X10(3) UL (ref 1–4)
LYMPHOCYTES NFR BLD AUTO: 30.8 %
MCH RBC QN AUTO: 33.6 PG (ref 26–34)
MCHC RBC AUTO-ENTMCNC: 35 G/DL (ref 31–37)
MCV RBC AUTO: 95.8 FL (ref 80–100)
MONOCYTES # BLD AUTO: 0.62 X10(3) UL (ref 0.1–1)
MONOCYTES NFR BLD AUTO: 7.1 %
NEUTROPHILS # BLD AUTO: 5.07 X10 (3) UL (ref 1.5–7.7)
NEUTROPHILS # BLD AUTO: 5.07 X10(3) UL (ref 1.5–7.7)
NEUTROPHILS NFR BLD AUTO: 58.2 %
OSMOLALITY SERPL CALC.SUM OF ELEC: 278 MOSM/KG (ref 275–295)
PLATELET # BLD AUTO: 286 10(3)UL (ref 150–450)
POTASSIUM SERPL-SCNC: 3.7 MMOL/L (ref 3.5–5.1)
PROT SERPL-MCNC: 6.8 G/DL (ref 5.7–8.2)
RBC # BLD AUTO: 4.56 X10(6)UL (ref 3.8–5.3)
SODIUM SERPL-SCNC: 135 MMOL/L (ref 136–145)
WBC # BLD AUTO: 8.7 X10(3) UL (ref 4–11)

## 2025-07-02 PROCEDURE — 88189 FLOWCYTOMETRY/READ 16 & >: CPT

## 2025-07-02 PROCEDURE — 36415 COLL VENOUS BLD VENIPUNCTURE: CPT

## 2025-07-02 PROCEDURE — 85025 COMPLETE CBC W/AUTO DIFF WBC: CPT

## 2025-07-02 PROCEDURE — 82784 ASSAY IGA/IGD/IGG/IGM EACH: CPT

## 2025-07-02 PROCEDURE — 88184 FLOWCYTOMETRY/ TC 1 MARKER: CPT

## 2025-07-02 PROCEDURE — 88185 FLOWCYTOMETRY/TC ADD-ON: CPT

## 2025-07-02 PROCEDURE — 80053 COMPREHEN METABOLIC PANEL: CPT

## 2025-07-07 LAB
CD10 CELLS NFR SPEC: <1 %
CD10/CD19: <1 %
CD19 CELLS NFR SPEC: <1 %
CD19+/CD200+: <1 %
CD2 CELLS NFR SPEC: 99 %
CD20 CELLS NFR SPEC: <1 %
CD200 CELLS: 10 %
CD3 CELLS NFR SPEC: 98 %
CD3+/TCRGD+: 2 %
CD3+CD4+ CELLS NFR SPEC: 68 %
CD3+CD4+ CELLS/CD3+CD8+ CLL SPEC: 2.3
CD3+CD8+ CELLS NFR SPEC: 29 %
CD3-/CD56+: 2 %
CD34 CELLS NFR SPEC: <1 %
CD38 CELLS NFR SPEC: 1 %
CD38+/CD19+: <1 %
CD45 CELLS NFR SPEC: 100 %
CD5 CELLS NFR SPEC: 97 %
CD5/CD19 CELLS: <1 %
CD7 CELLS NFR SPEC: 90 %
CELL SURF LAMBDA LIGHT CHAIN: <1 %
CELL SURFACE KAPPA LIGHT CHAIN: <1 %
TCR G-D CELLS NFR SPEC: 2 %

## (undated) DIAGNOSIS — E55.9 VITAMIN D DEFICIENCY: Primary | ICD-10-CM

## (undated) NOTE — MR AVS SNAPSHOT
[address] at [address], [place] [address]  [phone]               Thank you for choosing us for your health care visit with Michelle Elder MD.  We are glad to serve you and happy to marty Fluticasone Propionate 50 MCG/ACT Susp   2 sprays by Each Nare route daily. Commonly known as:  FLONASE ALLERGY RELIEF           ibuprofen 600 MG Tabs   Take 600 mg by mouth.    Commonly known as:  MOTRIN                Where to Get Your Medications

## (undated) NOTE — MR AVS SNAPSHOT
[address] at [place] [address]  [phone]               Thank you for choosing us for your health care visit with George Franklin MD.  We are glad to serve you and happy to marty Healthy Diet and Regular Exercise  [employer] [facility] for making healthy food choices  -   Enjoy your food, but eat less. Fully enjoy your food when eating. Don’t eat while distracted and slow down. Avoid over sized portions.    Andrzej Forde

## (undated) NOTE — LETTER
Date & Time: 2/12/2024, 6:54 PM  Patient: Sidra Laird  Encounter Provider(s):    Rosy Lemos APRN       To Whom It May Concern:    Sidra Laird was seen and treated in our department on 2/12/2024. She should not return to work until 02/15/2024.  .    CHIKIS Redman, 02/12/24, 6:54 PM

## (undated) NOTE — LETTER
[facility], [place]  Authorization for Invasive Procedures  1.  I hereby authorize Dr. Doyle Parmar , my physician and whomever may be designated as the doctor's assistant, to perform the following operation and/or procedure:  Lumbar 4. Should the need arise during my operation or immediate post-operative period; I also consent to the administration of blood and/or blood products.  Further, I understand that despite careful testing and screening of blood and blood products, I may still 9. Patients having a sterilization procedure: I understand that if the procedure is successful the results will be permanent and it will therefore be impossible for me to inseminate, conceive or bear children.  I also understand that the procedure is intend

## (undated) NOTE — MR AVS SNAPSHOT
[address] at [address], [place] [address]  [phone]               Thank you for choosing us for your health care visit with Patricia Ricks MD.  We are glad to serve you and happy to marty weekend appointments for your exam are available. Walk-in patients are welcome for most exams. [facility]  [facility] Parking) (Yellow Parking)  [address]   [address] not sign up before the expiration date, you must request a new code. Your unique [employer] Access Code: [email or web address]  Expires: 4/29/2017  2:05 PM    If you have questions, you can call [phone] to talk to our [facility] Staff.  Remember, [employer]

## (undated) NOTE — LETTER
Date & Time: 7/31/2023, 5:36 PM  Patient: Kraig Kelley  Encounter Provider(s):    CHIKIS Badillo       To Whom It May Concern:    Miguel Ángel Smiley was seen and treated in our department on 7/31/2023. She {Return to school/sport/work:[id number]}.     If you have any questions or concerns, please do not hesitate to call.        _____________________________  Physician/APC Signature

## (undated) NOTE — MR AVS SNAPSHOT
After Visit Summary   1/27/2021    Phil Rivera    MRN: [id number]           Visit Information     Date & Time  1/27/2021  1:00 PM Provider   [address] [facility] - Infusion Dept.  Phone  [phone] 2/22/2021 9:30 AM EM CC INFRN 3 [address] now offers Video Visits through [address] for adult and pediatric patients.   Video Visits are available Monday - Friday for many common conditions such as allergies Monday – Friday  10:00 am – 10:00 pm   Saturday – Sunday  10:00 am – 4:00 pm     [address]   Monday – Friday  4:00 pm – 10:00 pm   Saturday – Sunday  10:00 am – 4:00 pm  WALK-IN CARE  Emergency Medicine Providers  Conditions needing urgent attention, but

## (undated) NOTE — MR AVS SNAPSHOT
After Visit Summary   2/28/2017    Clari De La Cruz    MRN: [id number]           Visit Information        Provider Department Dept Phone    2/28/2017  2:15 PM Luis Balderas MD [address]  [phone]      Your Vitals Were     BP Ht [address] [facility] [address] [facility] [place], [address], [address] [facility]  [address]  [address] [place], [place]    [address]  [id number] [facility]N [facility] 23 Enter your e-mail address. You will receive e-mail notification when new information is available in [address] Click Sign In. You can now view your medical record.       Additional Information    If you have questions, you can call [phone] to talk

## (undated) NOTE — MR AVS SNAPSHOT
After Visit Summary   1/28/2021    Rashel Box    MRN: [id number]           Visit Information     Date & Time  1/28/2021  1:00 PM Provider   [address] - Infusion Dept.  Phone  [phone] [facility] now offers Video Visits through [address] for adult and pediatric patients. Video Visits are available Monday - Friday for many common conditions such as allergies, colds, cough, fever, rash, sore throat, headache and pink eye.   The cost for a Video Vi [address]   Monday – Friday  4:00 pm – 10:00 pm   Saturday – Sunday  10:00 am – 4:00 pm  WALK-IN CARE  Emergency Medicine Providers  Conditions needing urgent attention, but are   non-life-threatening.     Also available by appointment Average cost  $120*

## (undated) NOTE — LETTER
Date & Time: 12/23/2024, 4:26 PM  Patient: Sidra Laird  Encounter Provider(s):    Urban Welsh APRN       To Whom It May Concern:    Sidra Laird was seen and treated in our department on 12/23/2024. She has Covid-19 infection and cannot travel for the next week.     If you have any questions or concerns, please do not hesitate to call.        _____________________________  Physician/APC Signature

## (undated) NOTE — Clinical Note
Thank you for the consult. I saw Ms. Laird in  the endocrine/diabetes clinic today. Please see attached my note. Please feel free to contact me with any questions. Thanks!